# Patient Record
Sex: FEMALE | Race: WHITE | NOT HISPANIC OR LATINO | Employment: FULL TIME | URBAN - METROPOLITAN AREA
[De-identification: names, ages, dates, MRNs, and addresses within clinical notes are randomized per-mention and may not be internally consistent; named-entity substitution may affect disease eponyms.]

---

## 2017-03-20 ENCOUNTER — APPOINTMENT (OUTPATIENT)
Dept: RADIOLOGY | Facility: MEDICAL CENTER | Age: 37
End: 2017-03-20
Attending: EMERGENCY MEDICINE
Payer: COMMERCIAL

## 2017-03-20 ENCOUNTER — RESOLUTE PROFESSIONAL BILLING HOSPITAL PROF FEE (OUTPATIENT)
Dept: HOSPITALIST | Facility: MEDICAL CENTER | Age: 37
End: 2017-03-20
Payer: COMMERCIAL

## 2017-03-20 ENCOUNTER — HOSPITAL ENCOUNTER (OUTPATIENT)
Facility: MEDICAL CENTER | Age: 37
End: 2017-03-21
Attending: EMERGENCY MEDICINE | Admitting: HOSPITALIST
Payer: COMMERCIAL

## 2017-03-20 DIAGNOSIS — R06.09 DYSPNEA ON EXERTION: ICD-10-CM

## 2017-03-20 DIAGNOSIS — I15.9 SECONDARY HYPERTENSION: ICD-10-CM

## 2017-03-20 DIAGNOSIS — E66.01 MORBID OBESITY, UNSPECIFIED OBESITY TYPE (HCC): ICD-10-CM

## 2017-03-20 LAB
ALBUMIN SERPL BCP-MCNC: 3.7 G/DL (ref 3.2–4.9)
ALBUMIN/GLOB SERPL: 1.3 G/DL
ALP SERPL-CCNC: 70 U/L (ref 30–99)
ALT SERPL-CCNC: 18 U/L (ref 2–50)
ANION GAP SERPL CALC-SCNC: 8 MMOL/L (ref 0–11.9)
APTT PPP: 27.2 SEC (ref 24.7–36)
AST SERPL-CCNC: 16 U/L (ref 12–45)
BASOPHILS # BLD AUTO: 0.7 % (ref 0–1.8)
BASOPHILS # BLD: 0.07 K/UL (ref 0–0.12)
BILIRUB SERPL-MCNC: 0.5 MG/DL (ref 0.1–1.5)
BNP SERPL-MCNC: 40 PG/ML (ref 0–100)
BUN SERPL-MCNC: 9 MG/DL (ref 8–22)
CALCIUM SERPL-MCNC: 8.8 MG/DL (ref 8.5–10.5)
CHLORIDE SERPL-SCNC: 105 MMOL/L (ref 96–112)
CO2 SERPL-SCNC: 25 MMOL/L (ref 20–33)
CREAT SERPL-MCNC: 0.61 MG/DL (ref 0.5–1.4)
EKG IMPRESSION: NORMAL
EOSINOPHIL # BLD AUTO: 0.32 K/UL (ref 0–0.51)
EOSINOPHIL NFR BLD: 3.4 % (ref 0–6.9)
ERYTHROCYTE [DISTWIDTH] IN BLOOD BY AUTOMATED COUNT: 41 FL (ref 35.9–50)
GFR SERPL CREATININE-BSD FRML MDRD: >60 ML/MIN/1.73 M 2
GLOBULIN SER CALC-MCNC: 2.8 G/DL (ref 1.9–3.5)
GLUCOSE SERPL-MCNC: 101 MG/DL (ref 65–99)
HCG SERPL QL: NEGATIVE
HCT VFR BLD AUTO: 38.3 % (ref 37–47)
HGB BLD-MCNC: 12.5 G/DL (ref 12–16)
IMM GRANULOCYTES # BLD AUTO: 0.05 K/UL (ref 0–0.11)
IMM GRANULOCYTES NFR BLD AUTO: 0.5 % (ref 0–0.9)
INR PPP: 0.93 (ref 0.87–1.13)
LYMPHOCYTES # BLD AUTO: 2.07 K/UL (ref 1–4.8)
LYMPHOCYTES NFR BLD: 22.1 % (ref 22–41)
MCH RBC QN AUTO: 28.7 PG (ref 27–33)
MCHC RBC AUTO-ENTMCNC: 32.6 G/DL (ref 33.6–35)
MCV RBC AUTO: 88 FL (ref 81.4–97.8)
MONOCYTES # BLD AUTO: 0.39 K/UL (ref 0–0.85)
MONOCYTES NFR BLD AUTO: 4.2 % (ref 0–13.4)
NEUTROPHILS # BLD AUTO: 6.46 K/UL (ref 2–7.15)
NEUTROPHILS NFR BLD: 69.1 % (ref 44–72)
NRBC # BLD AUTO: 0 K/UL
NRBC BLD AUTO-RTO: 0 /100 WBC
PLATELET # BLD AUTO: 255 K/UL (ref 164–446)
PMV BLD AUTO: 9.7 FL (ref 9–12.9)
POTASSIUM SERPL-SCNC: 3.8 MMOL/L (ref 3.6–5.5)
PROT SERPL-MCNC: 6.5 G/DL (ref 6–8.2)
PROTHROMBIN TIME: 12.8 SEC (ref 12–14.6)
RBC # BLD AUTO: 4.35 M/UL (ref 4.2–5.4)
SODIUM SERPL-SCNC: 138 MMOL/L (ref 135–145)
T4 SERPL-MCNC: 9.9 UG/DL (ref 4–12)
TROPONIN I SERPL-MCNC: <0.01 NG/ML (ref 0–0.04)
TSH SERPL DL<=0.005 MIU/L-ACNC: 0.58 UIU/ML (ref 0.3–3.7)
WBC # BLD AUTO: 9.4 K/UL (ref 4.8–10.8)

## 2017-03-20 PROCEDURE — 700102 HCHG RX REV CODE 250 W/ 637 OVERRIDE(OP): Performed by: HOSPITALIST

## 2017-03-20 PROCEDURE — G0378 HOSPITAL OBSERVATION PER HR: HCPCS

## 2017-03-20 PROCEDURE — A9270 NON-COVERED ITEM OR SERVICE: HCPCS | Performed by: HOSPITALIST

## 2017-03-20 PROCEDURE — 84484 ASSAY OF TROPONIN QUANT: CPT

## 2017-03-20 PROCEDURE — 84443 ASSAY THYROID STIM HORMONE: CPT

## 2017-03-20 PROCEDURE — 83880 ASSAY OF NATRIURETIC PEPTIDE: CPT

## 2017-03-20 PROCEDURE — 85025 COMPLETE CBC W/AUTO DIFF WBC: CPT

## 2017-03-20 PROCEDURE — 99285 EMERGENCY DEPT VISIT HI MDM: CPT

## 2017-03-20 PROCEDURE — 71010 DX-CHEST-PORTABLE (1 VIEW): CPT

## 2017-03-20 PROCEDURE — 96374 THER/PROPH/DIAG INJ IV PUSH: CPT

## 2017-03-20 PROCEDURE — 36415 COLL VENOUS BLD VENIPUNCTURE: CPT

## 2017-03-20 PROCEDURE — 85610 PROTHROMBIN TIME: CPT

## 2017-03-20 PROCEDURE — 84436 ASSAY OF TOTAL THYROXINE: CPT

## 2017-03-20 PROCEDURE — 96372 THER/PROPH/DIAG INJ SC/IM: CPT

## 2017-03-20 PROCEDURE — 93005 ELECTROCARDIOGRAM TRACING: CPT

## 2017-03-20 PROCEDURE — 80053 COMPREHEN METABOLIC PANEL: CPT

## 2017-03-20 PROCEDURE — 99219 PR INITIAL OBSERVATION CARE,LEVL II: CPT | Performed by: HOSPITALIST

## 2017-03-20 PROCEDURE — 700111 HCHG RX REV CODE 636 W/ 250 OVERRIDE (IP): Performed by: HOSPITALIST

## 2017-03-20 PROCEDURE — 84703 CHORIONIC GONADOTROPIN ASSAY: CPT

## 2017-03-20 PROCEDURE — 93306 TTE W/DOPPLER COMPLETE: CPT | Mod: 26 | Performed by: INTERNAL MEDICINE

## 2017-03-20 PROCEDURE — 93306 TTE W/DOPPLER COMPLETE: CPT

## 2017-03-20 PROCEDURE — 85730 THROMBOPLASTIN TIME PARTIAL: CPT

## 2017-03-20 RX ORDER — PROMETHAZINE HYDROCHLORIDE 25 MG/1
12.5-25 SUPPOSITORY RECTAL EVERY 4 HOURS PRN
Status: DISCONTINUED | OUTPATIENT
Start: 2017-03-20 | End: 2017-03-21 | Stop reason: HOSPADM

## 2017-03-20 RX ORDER — FLUOXETINE HYDROCHLORIDE 20 MG/1
40 CAPSULE ORAL DAILY
Status: DISCONTINUED | OUTPATIENT
Start: 2017-03-21 | End: 2017-03-20

## 2017-03-20 RX ORDER — ONDANSETRON 4 MG/1
4 TABLET, ORALLY DISINTEGRATING ORAL EVERY 4 HOURS PRN
Status: DISCONTINUED | OUTPATIENT
Start: 2017-03-20 | End: 2017-03-21 | Stop reason: HOSPADM

## 2017-03-20 RX ORDER — ACETAMINOPHEN 325 MG/1
650 TABLET ORAL EVERY 6 HOURS PRN
Status: DISCONTINUED | OUTPATIENT
Start: 2017-03-20 | End: 2017-03-21 | Stop reason: HOSPADM

## 2017-03-20 RX ORDER — ONDANSETRON 2 MG/ML
4 INJECTION INTRAMUSCULAR; INTRAVENOUS EVERY 4 HOURS PRN
Status: DISCONTINUED | OUTPATIENT
Start: 2017-03-20 | End: 2017-03-21 | Stop reason: HOSPADM

## 2017-03-20 RX ORDER — HEPARIN SODIUM 5000 [USP'U]/ML
5000 INJECTION, SOLUTION INTRAVENOUS; SUBCUTANEOUS EVERY 8 HOURS
Status: DISCONTINUED | OUTPATIENT
Start: 2017-03-20 | End: 2017-03-20

## 2017-03-20 RX ORDER — ENALAPRILAT 1.25 MG/ML
1.25 INJECTION INTRAVENOUS EVERY 6 HOURS PRN
Status: DISCONTINUED | OUTPATIENT
Start: 2017-03-20 | End: 2017-03-21 | Stop reason: HOSPADM

## 2017-03-20 RX ORDER — GABAPENTIN 100 MG/1
200 CAPSULE ORAL EVERY EVENING
Status: DISCONTINUED | OUTPATIENT
Start: 2017-03-20 | End: 2017-03-21 | Stop reason: HOSPADM

## 2017-03-20 RX ORDER — CLONIDINE HYDROCHLORIDE 0.1 MG/1
0.1 TABLET ORAL 4 TIMES DAILY PRN
Status: DISCONTINUED | OUTPATIENT
Start: 2017-03-20 | End: 2017-03-21 | Stop reason: HOSPADM

## 2017-03-20 RX ORDER — PROMETHAZINE HYDROCHLORIDE 25 MG/1
12.5-25 TABLET ORAL EVERY 4 HOURS PRN
Status: DISCONTINUED | OUTPATIENT
Start: 2017-03-20 | End: 2017-03-21 | Stop reason: HOSPADM

## 2017-03-20 RX ORDER — FLUOXETINE HYDROCHLORIDE 20 MG/1
40 CAPSULE ORAL
Status: DISCONTINUED | OUTPATIENT
Start: 2017-03-20 | End: 2017-03-21 | Stop reason: HOSPADM

## 2017-03-20 RX ADMIN — ENALAPRILAT 1.25 MG: 1.25 INJECTION INTRAVENOUS at 18:35

## 2017-03-20 RX ADMIN — ENOXAPARIN SODIUM 40 MG: 100 INJECTION SUBCUTANEOUS at 19:46

## 2017-03-20 RX ADMIN — GABAPENTIN 200 MG: 100 CAPSULE ORAL at 21:44

## 2017-03-20 RX ADMIN — ACETAMINOPHEN 650 MG: 325 TABLET, FILM COATED ORAL at 19:46

## 2017-03-20 RX ADMIN — FLUOXETINE HYDROCHLORIDE 40 MG: 20 CAPSULE ORAL at 19:46

## 2017-03-20 ASSESSMENT — LIFESTYLE VARIABLES
EVER_SMOKED: YES
EVER FELT BAD OR GUILTY ABOUT YOUR DRINKING: NO
AVERAGE NUMBER OF DAYS PER WEEK YOU HAVE A DRINK CONTAINING ALCOHOL: 7
HAVE YOU EVER FELT YOU SHOULD CUT DOWN ON YOUR DRINKING: YES
HAVE PEOPLE ANNOYED YOU BY CRITICIZING YOUR DRINKING: NO
ON A TYPICAL DAY WHEN YOU DRINK ALCOHOL HOW MANY DRINKS DO YOU HAVE: 3
EVER HAD A DRINK FIRST THING IN THE MORNING TO STEADY YOUR NERVES TO GET RID OF A HANGOVER: NO
TOTAL SCORE: 1
TOTAL SCORE: 1
CONSUMPTION TOTAL: POSITIVE
TOTAL SCORE: 1
DO YOU DRINK ALCOHOL: NO
HOW MANY TIMES IN THE PAST YEAR HAVE YOU HAD 5 OR MORE DRINKS IN A DAY: 0
ALCOHOL_USE: YES

## 2017-03-20 ASSESSMENT — PAIN SCALES - GENERAL
PAINLEVEL_OUTOF10: 9
PAINLEVEL_OUTOF10: 8
PAINLEVEL_OUTOF10: 0
PAINLEVEL_OUTOF10: 8

## 2017-03-20 NOTE — ED NOTES
"Chief Complaint   Patient presents with   • Shortness of Breath   • Peripheral Edema   • Weakness     3+ pitting edema lower legs and also generalized. Pt SOB denies any cough. Pt reports s/s come and go. 15 lbs weight gain in past week. AMB to triage increased SOB. No medical dx, but pt waiting for PCP appointment. Blood pressure 165/105, pulse 82, temperature 37.3 °C (99.2 °F), resp. rate 16, height 1.651 m (5' 5\"), weight 170.6 kg (376 lb 1.7 oz), last menstrual period 03/17/2017, SpO2 96 %.    "

## 2017-03-20 NOTE — IP AVS SNAPSHOT
" Home Care Instructions                                                                                                                  Name:Sonya Flor  Medical Record Number:[id number]  CSN: [id number]    YOB: 1980   Age: 36 y.o.  Sex: female  HT:1.651 m (5' 5\") WT: 169.1 kg (372 lb 12.8 oz)          Admit Date: 3/20/2017     Discharge Date:   Today's Date: 3/21/2017  Attending Doctor:  Noé Perez M.D.                  Allergies:  Review of patient's allergies indicates no known allergies.            Discharge Instructions       Discharge Instructions    Discharged to home by car with relative. Discharged via wheelchair, hospital escort: Yes.  Special equipment needed: Not Applicable    Be sure to schedule a follow-up appointment with your primary care doctor or any specialists as instructed.     Discharge Plan:   Diet Plan: Discussed  Activity Level: Discussed  Confirmed Follow up Appointment: Appointment Scheduled  Confirmed Symptoms Management: Discussed  Medication Reconciliation Updated: Yes  Influenza Vaccine Indication: Patient Refuses    I understand that a diet low in cholesterol, fat, and sodium is recommended for good health. Unless I have been given specific instructions below for another diet, I accept this instruction as my diet prescription.   Other diet: Heart Healthy    Special Instructions: None    · Is patient discharged on Warfarin / Coumadin?   No     · Is patient Post Blood Transfusion?  No    Depression / Suicide Risk    As you are discharged from this [facility] facility, it is important to learn how to keep safe from harming yourself.    Recognize the warning signs:  · Abrupt changes in personality, positive or negative- including increase in energy   · Giving away possessions  · Change in eating patterns- significant weight changes-  positive or negative  · Change in sleeping patterns- unable to sleep or sleeping all the time   · Unwillingness or inability to " communicate  · Depression  · Unusual sadness, discouragement and loneliness  · Talk of wanting to die  · Neglect of personal appearance   · Rebelliousness- reckless behavior  · Withdrawal from people/activities they love  · Confusion- inability to concentrate     If you or a loved one observes any of these behaviors or has concerns about self-harm, here's what you can do:  · Talk about it- your feelings and reasons for harming yourself  · Remove any means that you might use to hurt yourself (examples: pills, rope, extension cords, firearm)  · Get professional help from the community (Mental Health, Substance Abuse, psychological counseling)  · Do not be alone:Call your Safe Contact- someone whom you trust who will be there for you.  · Call your local CRISIS HOTLINE 622-2759 or 038-597-5619  · Call your local Children's Mobile Crisis Response Team [place] (463) 789-7148 or www.WorkTouch  · Call the toll free National Suicide Prevention Hotlines   · National Suicide Prevention Lifeline 517-015-NRGA (2533)  · National Hope Line Network 800-SUICIDE (901-8450)  Stress and Stress Management  Stress is a normal reaction to life events. It is what you feel when life demands more than you are used to or more than you can handle. Some stress can be useful. For example, the stress reaction can help you catch the last bus of the day, study for a test, or meet a deadline at work. But stress that occurs too often or for too long can cause problems. It can affect your emotional health and interfere with relationships and normal daily activities. Too much stress can weaken your immune system and increase your risk for physical illness. If you already have a medical problem, stress can make it worse.  CAUSES   All sorts of life events may cause stress. An event that causes stress for one person may not be stressful for another person. Major life events commonly cause stress. These may be positive or negative. Examples  include losing your job, moving into a new home, getting , having a baby, or losing a loved one. Less obvious life events may also cause stress, especially if they occur day after day or in combination. Examples include working long hours, driving in traffic, caring for children, being in debt, or being in a difficult relationship.  SIGNS AND SYMPTOMS  Stress may cause emotional symptoms including, the following:  · Anxiety. This is feeling worried, afraid, on edge, overwhelmed, or out of control.  · Anger. This is feeling irritated or impatient.  · Depression. This is feeling sad, down, helpless, or guilty.  · Difficulty focusing, remembering, or making decisions.  Stress may cause physical symptoms, including the following:   · Aches and pains. These may affect your head, neck, back, stomach, or other areas of your body.  · Tight muscles or clenched jaw.  · Low energy or trouble sleeping.   Stress may cause unhealthy behaviors, including the following:   · Eating to feel better (overeating) or skipping meals.  · Sleeping too little, too much, or both.  · Working too much or putting off tasks (procrastination).  · Smoking, drinking alcohol, or using drugs to feel better.  DIAGNOSIS   Stress is diagnosed through an assessment by your health care provider. Your health care provider will ask questions about your symptoms and any stressful life events. Your health care provider will also ask about your medical history and may order blood tests or other tests. Certain medical conditions and medicine can cause physical symptoms similar to stress.  Mental illness can cause emotional symptoms and unhealthy behaviors similar to stress. Your health care provider may refer you to a mental health professional for further evaluation.   TREATMENT   Stress management is the recommended treatment for stress. The goals of stress management are reducing stressful life events and coping with stress in healthy ways.    "  Techniques for reducing stressful life events include the following:  · Stress identification. Self-monitor for stress and identify what causes stress for you. These skills may help you to avoid some stressful events.  · Time management. Set your priorities, keep a calendar of events, and learn to say \"no.\" These tools can help you avoid making too many commitments.  Techniques for coping with stress include the following:  · Rethinking the problem. Try to think realistically about stressful events rather than ignoring them or overreacting. Try to find the positives in a stressful situation rather than focusing on the negatives.  · Exercise. Physical exercise can release both physical and emotional tension. The key is to find a form of exercise you enjoy and do it regularly.  · Relaxation techniques. These relax the body and mind. Examples include yoga, meditation, florencia chi, biofeedback, deep breathing, progressive muscle relaxation, listening to music, being out in nature, journaling, and other hobbies. Again, the key is to find one or more that you enjoy and can do regularly.  · Healthy lifestyle. Eat a balanced diet, get plenty of sleep, and do not smoke. Avoid using alcohol or drugs to relax.  · Strong support network. Spend time with family, friends, or other people you enjoy being around. Express your feelings and talk things over with someone you trust.  Counseling or talk therapy with a mental health professional may be helpful if you are having difficulty managing stress on your own. Medicine is typically not recommended for the treatment of stress. Talk to your health care provider if you think you need medicine for symptoms of stress.  HOME CARE INSTRUCTIONS  · Keep all follow-up visits as directed by your health care provider.  · Take all medicines as directed by your health care provider.  SEEK MEDICAL CARE IF:  · Your symptoms get worse or you start having new symptoms.  · You feel overwhelmed by your " problems and can no longer manage them on your own.  SEEK IMMEDIATE MEDICAL CARE IF:  · You feel like hurting yourself or someone else.     This information is not intended to replace advice given to you by your health care provider. Make sure you discuss any questions you have with your health care provider.     Document Released: 06/13/2002 Document Revised: 01/08/2016 Document Reviewed: 08/12/2014  [employer] Patient Education ©2016 [employer]        Your appointments     May 17, 2017  9:00 AM   New Patient with Domitila Ahumada M.D.   [facility] (--)    [address]  [address]  [place] [id number]   [phone]           Please bring Photo ID, Insurance Cards, All Medication Bottles and copies of any legal documents (such as Living Will, Power of ) If speaking a language besides English please bring an adult . Please arrive 30 minutes prior for check in and registration. You will be receiving a confirmation call a few days before your appointment from our automated call confirmation system.                 Discharge Medication Instructions:    Below are the medications your physician expects you to take upon discharge:    Review all your home medications and newly ordered medications with your doctor and/or pharmacist. Follow medication instructions as directed by your doctor and/or pharmacist.    Please keep your medication list with you and share with your physician.               Medication List      CHANGE how you take these medications        Instructions    fluoxetine 20 MG Caps   What changed:  See the new instructions.   Last time this was given:  40 mg on 3/20/2017  7:46 PM   Commonly known as:  PROZAC    TAKE ONE CAPSULE BY MOUTH EVERY DAY               Instructions           Diet / Nutrition:    Follow any diet instructions given to you by your doctor or the dietician, including how much salt (sodium) you are allowed each day.    If you are overweight,  talk to your doctor about a weight reduction plan.    Activity:    Remain physically active following your doctor's instructions about exercise and activity.    Rest often.     Any time you become even a little tired or short of breath, SIT DOWN and rest.    Worsening Symptoms:    Report any of the following signs and symptoms to the doctor's office immediately:    *Pain of jaw, arm, or neck  *Chest pain not relieved by medication                               *Dizziness or loss of consciousness  *Difficulty breathing even when at rest   *More tired than usual                                       *Bleeding drainage or swelling of surgical site  *Swelling of feet, ankles, legs or stomach                 *Fever (>100ºF)  *Pink or blood tinged sputum  *Weight gain (3lbs/day or 5lbs /week)           *Shock from internal defibrillator (if applicable)  *Palpitations or irregular heartbeats                *Cool and/or numb extremities    Stroke Awareness    Common Risk Factors for Stroke include:    Age  Atrial Fibrillation  Carotid Artery Stenosis  Diabetes Mellitus  Excessive alcohol consumption  High blood pressure  Overweight   Physical inactivity  Smoking    Warning signs and symptoms of a stroke include:    *Sudden numbness or weakness of the face, arm or leg (especially on one side of the body).  *Sudden confusion, trouble speaking or understanding.  *Sudden trouble seeing in one or both eyes.  *Sudden trouble walking, dizziness, loss of balance or coordination.Sudden severe headache with no known cause.    It is very important to get treatment quickly when a stroke occurs. If you experience any of the above warning signs, call 911 immediately.                   Disclaimer         Quit Smoking / Tobacco Use:    I understand the use of any tobacco products increases my chance of suffering from future heart disease or stroke and could cause other illnesses which may shorten my life. Quitting the use of tobacco  products is the single most important thing I can do to improve my health. For further information on smoking / tobacco cessation call a Toll Free Quit Line at 1-539.795.1437 (*National Cancer [place]) or 1-645.884.2402 (American Lung Association) or you can access the web based program at www.lungusa.org.    [place] Tobacco Users Help Line:  (284) 857-8430       Toll Free: 1-621.550.5731  Quit Tobacco Program [facility] (349)426-7386    Crisis Hotline:    [place] Crisis Hotline:  8-566-SCOGLVN or 1-288.853.3780    [place] Crisis Hotline:    1-897.660.7329 or 520-371-4022    Discharge Survey:   Thank you for choosing [facility]. We hope we did everything we could to make your hospital stay a pleasant one. You may be receiving a phone survey and we would appreciate your time and participation in answering the questions. Your input is very valuable to us in our efforts to improve our service to our patients and their families.        My signature on this form indicates that:    1. I have reviewed and understand the above information.  2. My questions regarding this information have been answered to my satisfaction.  3. I have formulated a plan with my discharge nurse to obtain my prescribed medications for home.                  Disclaimer         __________________________________                     __________       ________                       Patient Signature                                                 Date                    Time

## 2017-03-21 ENCOUNTER — PATIENT OUTREACH (OUTPATIENT)
Dept: HEALTH INFORMATION MANAGEMENT | Facility: OTHER | Age: 37
End: 2017-03-21

## 2017-03-21 VITALS
RESPIRATION RATE: 18 BRPM | DIASTOLIC BLOOD PRESSURE: 72 MMHG | OXYGEN SATURATION: 94 % | WEIGHT: 293 LBS | BODY MASS INDEX: 48.82 KG/M2 | SYSTOLIC BLOOD PRESSURE: 128 MMHG | TEMPERATURE: 98.5 F | HEIGHT: 65 IN | HEART RATE: 74 BPM

## 2017-03-21 LAB
DEPRECATED D DIMER PPP IA-ACNC: <200 NG/ML(D-DU)
LV EJECT FRACT  99904: 55
LV EJECT FRACT MOD 2C 99903: 53.3
LV EJECT FRACT MOD 4C 99902: 52.46
LV EJECT FRACT MOD BP 99901: 53.42

## 2017-03-21 PROCEDURE — 85379 FIBRIN DEGRADATION QUANT: CPT

## 2017-03-21 PROCEDURE — 700111 HCHG RX REV CODE 636 W/ 250 OVERRIDE (IP): Performed by: HOSPITALIST

## 2017-03-21 PROCEDURE — 96372 THER/PROPH/DIAG INJ SC/IM: CPT

## 2017-03-21 PROCEDURE — G0378 HOSPITAL OBSERVATION PER HR: HCPCS

## 2017-03-21 PROCEDURE — 99217 PR OBSERVATION CARE DISCHARGE: CPT | Performed by: FAMILY MEDICINE

## 2017-03-21 RX ADMIN — ENOXAPARIN SODIUM 40 MG: 100 INJECTION SUBCUTANEOUS at 08:55

## 2017-03-21 ASSESSMENT — PAIN SCALES - GENERAL
PAINLEVEL_OUTOF10: 0
PAINLEVEL_OUTOF10: 0

## 2017-03-21 ASSESSMENT — COPD QUESTIONNAIRES
HAVE YOU SMOKED AT LEAST 100 CIGARETTES IN YOUR ENTIRE LIFE: YES
DO YOU EVER COUGH UP ANY MUCUS OR PHLEGM?: NO/ONLY WITH OCCASIONAL COLDS OR INFECTIONS
DURING THE PAST 4 WEEKS HOW MUCH DID YOU FEEL SHORT OF BREATH: SOME OF THE TIME
COPD SCREENING SCORE: 4

## 2017-03-21 ASSESSMENT — LIFESTYLE VARIABLES: EVER_SMOKED: YES

## 2017-03-21 NOTE — PROGRESS NOTES
PIV no longer flushing. PIV removed. Pt tolerated well; Notified WOODY Villagomez of no IV access. IV access not needed at this time.

## 2017-03-21 NOTE — PROGRESS NOTES
"Pt arrived to unit via smith at 1825. Pt oriented to room, unit, and plan of care. Tele-monitor placed; Pt crying, and anxious; BP elevated; medicated per MAR. Admit profile complete; Pt states,\" I'm not feeling as anxious.\" Pt verbalizes that she has been feeling tired more than normal; sleeping well , but still tired; Pt denies CP, and SOB; All questions answered at this time. Call light within reach; fall precautions in place.   "

## 2017-03-21 NOTE — PROGRESS NOTES
Pt c/o restless leg and not being able to sleep because of it. States used to take gabapentin 200mg QHS. Dr. Lundy notified, TO for gabapentine 200mg QHS PO, read back to Dr. Lundy.

## 2017-03-21 NOTE — ED NOTES
Pt resting in bed. Family at bedside.  Denies pain, states that she is anxious and wants to go home. She explained that when she is having anxiety issues at home she uses  Her sisters medications (hydrooxyzine)

## 2017-03-21 NOTE — ED NOTES
Pt resting comfortably in bed, denies pain, shows no s/s of distress. Pt's family is at bedside. Monitors in place. Will continue to monitor.

## 2017-03-21 NOTE — PROGRESS NOTES
"RN discharging pt. Pt begins to cry. Pt says, \" I am so anxious I feel like I am going to throw up!\" Pt asks why MD will not give her anything for anxiety. RN provided emotional support and offered mental health resources. RN recommend to follow up with PCP for anxiety concerns. Pt verbalized understanding and refused mental health resources.   Pt discharged; Personal belongings with pt when leaving unit. Pt given discharge instructions prior to leaving unit including  when to visit with physician; verbalizes understanding. Copy of discharge instructions with pt and in the chart.  "

## 2017-03-21 NOTE — DISCHARGE SUMMARY
Hospital Medicine Discharge Note     Admit Date:  3/20/2017         Discharge Date:   3/21/2017    Primary Care Provider:    Pcp Pt States None    Attending Physician:   Gini Peter M.D.     Discharge Diagnoses:   Active Problems:    MATT (dyspnea on exertion)    Chronic Medical Problems:  Includes morbid obesity with history of gastric sleeve    in 2013 in [place], [place].    Consultants:      None     Studies:  White count 9, hemoglobin 12.5, platelet count 255,000.  BUN   and creatinine 9 and 0.68.  Rest of CMP unremarkable.  Troponin less than    0.01.  BNP of 40.    Imaging/ Testing:      ECHOCARDIOGRAM COMP W/O CONT   Final Result   CONCLUSIONS  No prior study is available for comparison.   Technically difficult study - adequate information is obtained.   Normal left ventricular size, thickness, systolic function, and   diastolic function.  Left ventricular ejection fraction is visually estimated to be 55%.  The left atrium is normal in size.  Structurally normal mitral valve without significant stenosis or   regurgitation.  Structurally normal aortic valve without significant stenosis or   regurgitation.  The right ventricle was normal in size and function.  Normal pericardium without effusion.  Estimated right ventricular systolic pressure is 30 mmHg.     DX-CHEST-PORTABLE (1 VIEW)   Final Result      No acute cardiopulmonary process is seen.        Procedures:        None     Hospital Summary (Brief Narrative):       For H and P on admission, please refer to full H and P dictated by Dr. Lundy on 3/20/17. In brief this is a 36 year old female with history of morbid obesity, s/p gastric sleeve surgery in 2013 where she reportedly lost 170 pounds in 10 months but gained most of it back. She presented with complaints of increased exertional dyspnea over the past 8 months and leg swelling. An echocardiogram was done which was unremarkable with 55% EF as noted above. Her blood pressure was slightly elevated  overnight but resolved and I do not believe she needs anti-hypertensive treatment at this time. D-dimer was negative. She has been maintaining O2 saturations on room air fine. Medically stable. She has a follow up with PCP and sleep study to evaluate ROB. At this time stable for discharge home.     Disposition:   Discharge home    Condition:  Stable    Activity:   As tolerated    Diet:   As tolerated     Discharge Medications:         Sonya Flor   Home Medication Instructions LITTLE:[id number]    Printed on:03/21/17 5695   Medication Information                      fluoxetine (PROZAC) 20 MG Cap  TAKE ONE CAPSULE BY MOUTH EVERY DAY               Follow up appointment details :      PCP as scheduled - for ROB sleep study referral     Instructions:      Return to ER if new or worsening symptoms develop

## 2017-03-21 NOTE — PROGRESS NOTES
Bedside report received 0705. POC discussed with pt; Pt verbalizes improvement in how she feels, No over night events; Leg swelling has decreased and pt denies SOB; all questions answered at this time.

## 2017-03-21 NOTE — DISCHARGE PLANNING
Care Transition Team Assessment    Information Source  Orientation : Oriented x 4  Information Given By: Patient  Who is responsible for making decisions for patient? : Patient    Readmission Evaluation  Is this a readmission?: No    Elopement Risk  Legal Hold: No  Ambulatory or Self Mobile in Wheelchair: No-Not an Elopement Risk    Interdisciplinary Discharge Planning  Does Admitting Nurse Feel This Could be a Complex Discharge?: No  Primary Care Physician: Domitila Ahumada (pt has first appt in May)  Lives with - Patient's Self Care Capacity: Alone and Able to Care For Self  Support Systems: Parent  Do You Take your Prescribed Medications Regularly: Yes  Able to Return to Previous ADL's: Yes  Mobility Issues: No  Prior Services: None  Assistance Needed: No  Durable Medical Equipment: Not Applicable    Discharge Preparedness  What are your discharge supports?: Parent  Prior Functional Level: Independent with Activities of Daily Living  Difficulity with ADLs: None  Difficulity with IADLs: None    Functional Assesment  Prior Functional Level: Independent with Activities of Daily Living    Finances  Prescription Coverage: Yes                   Domestic Abuse  Have you ever been the victim of abuse or violence?: No    Psychological Assessment  History of Psychiatric Problems: Yes (depression)    Discharge Risks or Barriers  Discharge risks or barriers?: No    Anticipated Discharge Information  Anticipated discharge disposition: Home  Discharge Address: facesheet is for billing (physical address [id number], [place])  Discharge Contact Phone Number: [phone]

## 2017-03-21 NOTE — PROGRESS NOTES
Report received from Audelia OLIVA. Tele monitoring in place. Family at bedside. Pt ambulated to bathroom, no assistance needed, steady. Bed in low position, call light within reach.

## 2017-03-21 NOTE — DISCHARGE INSTRUCTIONS
Discharge Instructions    Discharged to home by car with relative. Discharged via wheelchair, hospital escort: Yes.  Special equipment needed: Not Applicable    Be sure to schedule a follow-up appointment with your primary care doctor or any specialists as instructed.     Discharge Plan:   Diet Plan: Discussed  Activity Level: Discussed  Confirmed Follow up Appointment: Appointment Scheduled  Confirmed Symptoms Management: Discussed  Medication Reconciliation Updated: Yes  Influenza Vaccine Indication: Patient Refuses    I understand that a diet low in cholesterol, fat, and sodium is recommended for good health. Unless I have been given specific instructions below for another diet, I accept this instruction as my diet prescription.   Other diet: Heart Healthy    Special Instructions: None    · Is patient discharged on Warfarin / Coumadin?   No     · Is patient Post Blood Transfusion?  No    Depression / Suicide Risk    As you are discharged from this [facility] facility, it is important to learn how to keep safe from harming yourself.    Recognize the warning signs:  · Abrupt changes in personality, positive or negative- including increase in energy   · Giving away possessions  · Change in eating patterns- significant weight changes-  positive or negative  · Change in sleeping patterns- unable to sleep or sleeping all the time   · Unwillingness or inability to communicate  · Depression  · Unusual sadness, discouragement and loneliness  · Talk of wanting to die  · Neglect of personal appearance   · Rebelliousness- reckless behavior  · Withdrawal from people/activities they love  · Confusion- inability to concentrate     If you or a loved one observes any of these behaviors or has concerns about self-harm, here's what you can do:  · Talk about it- your feelings and reasons for harming yourself  · Remove any means that you might use to hurt yourself (examples: pills, rope, extension cords, firearm)  · Get  professional help from the community (Mental Health, Substance Abuse, psychological counseling)  · Do not be alone:Call your Safe Contact- someone whom you trust who will be there for you.  · Call your local CRISIS HOTLINE 221-0977 or 530-679-8949  · Call your local Children's Mobile Crisis Response Team [place] (876) 155-8705 or www.[employer]  · Call the toll free National Suicide Prevention Hotlines   · National Suicide Prevention Lifeline 228-630-EKRO (4217)  · [employer] Hope Line Network 800-SUICIDE (278-1439)  Stress and Stress Management  Stress is a normal reaction to life events. It is what you feel when life demands more than you are used to or more than you can handle. Some stress can be useful. For example, the stress reaction can help you catch the last bus of the day, study for a test, or meet a deadline at work. But stress that occurs too often or for too long can cause problems. It can affect your emotional health and interfere with relationships and normal daily activities. Too much stress can weaken your immune system and increase your risk for physical illness. If you already have a medical problem, stress can make it worse.  CAUSES   All sorts of life events may cause stress. An event that causes stress for one person may not be stressful for another person. Major life events commonly cause stress. These may be positive or negative. Examples include losing your job, moving into a new home, getting , having a baby, or losing a loved one. Less obvious life events may also cause stress, especially if they occur day after day or in combination. Examples include working long hours, driving in traffic, caring for children, being in debt, or being in a difficult relationship.  SIGNS AND SYMPTOMS  Stress may cause emotional symptoms including, the following:  · Anxiety. This is feeling worried, afraid, on edge, overwhelmed, or out of control.  · Anger. This is feeling irritated or  "impatient.  · Depression. This is feeling sad, down, helpless, or guilty.  · Difficulty focusing, remembering, or making decisions.  Stress may cause physical symptoms, including the following:   · Aches and pains. These may affect your head, neck, back, stomach, or other areas of your body.  · Tight muscles or clenched jaw.  · Low energy or trouble sleeping.   Stress may cause unhealthy behaviors, including the following:   · Eating to feel better (overeating) or skipping meals.  · Sleeping too little, too much, or both.  · Working too much or putting off tasks (procrastination).  · Smoking, drinking alcohol, or using drugs to feel better.  DIAGNOSIS   Stress is diagnosed through an assessment by your health care provider. Your health care provider will ask questions about your symptoms and any stressful life events. Your health care provider will also ask about your medical history and may order blood tests or other tests. Certain medical conditions and medicine can cause physical symptoms similar to stress.  Mental illness can cause emotional symptoms and unhealthy behaviors similar to stress. Your health care provider may refer you to a mental health professional for further evaluation.   TREATMENT   Stress management is the recommended treatment for stress. The goals of stress management are reducing stressful life events and coping with stress in healthy ways.   Techniques for reducing stressful life events include the following:  · Stress identification. Self-monitor for stress and identify what causes stress for you. These skills may help you to avoid some stressful events.  · Time management. Set your priorities, keep a calendar of events, and learn to say \"no.\" These tools can help you avoid making too many commitments.  Techniques for coping with stress include the following:  · Rethinking the problem. Try to think realistically about stressful events rather than ignoring them or overreacting. Try to find " the positives in a stressful situation rather than focusing on the negatives.  · Exercise. Physical exercise can release both physical and emotional tension. The key is to find a form of exercise you enjoy and do it regularly.  · Relaxation techniques. These relax the body and mind. Examples include yoga, meditation, florencia chi, biofeedback, deep breathing, progressive muscle relaxation, listening to music, being out in nature, journaling, and other hobbies. Again, the key is to find one or more that you enjoy and can do regularly.  · Healthy lifestyle. Eat a balanced diet, get plenty of sleep, and do not smoke. Avoid using alcohol or drugs to relax.  · Strong support network. Spend time with family, friends, or other people you enjoy being around. Express your feelings and talk things over with someone you trust.  Counseling or talk therapy with a mental health professional may be helpful if you are having difficulty managing stress on your own. Medicine is typically not recommended for the treatment of stress. Talk to your health care provider if you think you need medicine for symptoms of stress.  HOME CARE INSTRUCTIONS  · Keep all follow-up visits as directed by your health care provider.  · Take all medicines as directed by your health care provider.  SEEK MEDICAL CARE IF:  · Your symptoms get worse or you start having new symptoms.  · You feel overwhelmed by your problems and can no longer manage them on your own.  SEEK IMMEDIATE MEDICAL CARE IF:  · You feel like hurting yourself or someone else.     This information is not intended to replace advice given to you by your health care provider. Make sure you discuss any questions you have with your health care provider.     Document Released: 06/13/2002 Document Revised: 01/08/2016 Document Reviewed: 08/12/2014  [employer] Interactive Patient Education ©2016 [employer]

## 2017-03-21 NOTE — PROGRESS NOTES
Assessed pt. A&O x4. Denies n/t, nausea at this time. C/o headache, med per MAR. POC discussed with pt. Hourly rounding in place.

## 2017-03-21 NOTE — ED PROVIDER NOTES
ED Provider Note    CHIEF COMPLAINT  Chief Complaint   Patient presents with   • Shortness of Breath   • Peripheral Edema   • Weakness       HPI  Sonya Flor is a 36 y.o. female who presents to the emergency department complaining of leg edema. The patient says that over the last 4 days she's been having shortness of breath with any kind of exertion bilateral leg pain she feels her legs are swollen she feels her face is puffy and she says that she's gained 15 pounds in the last 1 week. The patient is morbidly obese and several years ago was weighing more than 400 pounds and underwent a sleeve procedure and she says that her weight did go down to 200 pounds but over the last 1 year she has gone back up to 360 pounds. The patient says that with any exertion she is completely out of breath and she definitely is short of breath just walking to the bathroom and back to her room in the emergency department. The patient says that she quit smoking one month ago. The patient has a history of hypertension and is hypertensive in the emergency department but says she is not taking any medications for this.     REVIEW OF SYSTEMS no fever or chills no hemoptysis no chest pain. All other systems negative    PAST MEDICAL HISTORY  Past Medical History   Diagnosis Date   • MDD (major depressive disorder)        FAMILY HISTORY  No family history on file.    SOCIAL HISTORY  Social History     Social History   • Marital Status: Single     Spouse Name: N/A   • Number of Children: N/A   • Years of Education: N/A     Social History Main Topics   • Smoking status: Current Every Day Smoker -- 0.25 packs/day     Types: Cigarettes   • Smokeless tobacco: Never Used   • Alcohol Use: No   • Drug Use: No   • Sexual Activity: Not on file     Other Topics Concern   • Not on file     Social History Narrative       SURGICAL HISTORY  No past surgical history on file.    CURRENT MEDICATIONS  Home Medications     Reviewed by Audelia Sexton R.N.  "(Registered Nurse) on 03/20/17 at 1824  Med List Status: Complete    Medication Last Dose Status    fluoxetine (PROZAC) 20 MG Cap 3/19/2017 Active                ALLERGIES  No Known Allergies    PHYSICAL EXAM  VITAL SIGNS: /101 mmHg  Pulse 82  Temp(Src) 37.3 °C (99.1 °F)  Resp 18  Ht 1.651 m (5' 5\")  Wt 169.1 kg (372 lb 12.8 oz)  BMI 62.04 kg/m2  SpO2 95%  LMP 03/17/2017  Breastfeeding? No   Oxygen saturation is interpreted as adequate  Constitutional: Awake and verbal and dyspneic after walking back from the restroom but otherwise nontoxic appearing  HENT: Mucous membranes are moist and throat clear  Eyes: No erythema or discharge or jaundice  Neck: Trachea midline no JVD  Cardiovascular: Regular rate and rhythm  Lungs: Clear and equal bilaterally with no apparent difficulty breathing  Abdomen/Back: Morbidly obese soft nontender nondistended  Skin: Warm and dry  Musculoskeletal: The patient's legs are tremendously obese I do not find. Edema the feet are warm and well perfused  Neurologic: Awake and verbal and moving all extremities    Laboratory  A CBC shows a white blood cell count of 9.4 hemoglobin cyclical 0.5 basic metabolic panels unremarkable LFTs are unremarkable troponin is normal BMP is normal at 40 INR is normal pregnancy test was negative    EKG interpretation  A 12-lead EKG showed a sinus rhythm at 86 bpm there is no pathologic ST elevation or depression or ectopy MN interval is 148 ms QTc interval is 464 ms    Radiology  DX-CHEST-PORTABLE (1 VIEW)   Final Result      No acute cardiopulmonary process is seen.      ECHOCARDIOGRAM COMP W/O CONT    (Results Pending)     MEDICAL DECISION MAKING and DISPOSITION  In the emergency department an IV was established and the patient was placed on a cardiac monitor. I reviewed all the findings with the patient. I reviewed the case with the hospitalist and the patient will be admitted for further evaluation and further treatment    IMPRESSION  1. " Dyspnea with exertion  2. Morbid obesity  3. Hypertension           Electronically signed by: Elio Haq, 3/20/2017 7:10 PM

## 2017-03-21 NOTE — H&P
PRIMARY CARE PHYSICIAN:  None.    CHIEF COMPLAINT:  Dyspnea with exertion and fatigue.    HISTORY OF PRESENT ILLNESS:  Patient is a 36-year-old female with history of   morbid obesity, gastric sleeve surgery in 2013 where she reports initially   losing 170 pounds over 10 months, has gained most of it back, now here at   [facility] with complaints of progressive worsening shortness of breath especially   with exertion over the past 8 months.  Patient notes leg swelling has been   usually 1 or 2 days and then resolved, lately been more continuous lasting 4   days.  Reports at times tingling in her feet.  Reports increasing shortness of   breath with anxiety.  Reports in the ER some bruising noted on her thumb.    Reports no falls.  Has occasional wheeze.  Has been a smoker since age 16   years of age having cut down to 4 cigarettes a day, quit a month ago.  She   reports no melena or hematochezia.  No calf pain.  No chest pain or localized   weakness.  The patient reports daughter has not slept well since childhood.    She also snores.    REVIEW OF SYSTEMS:  As above, otherwise, negative according to AMA and CMS   criteria.    PAST MEDICAL HISTORY:  Includes morbid obesity with history of gastric sleeve   in 2013 in [place], [place].    MEDICATIONS:  She is currently on Prozac 20 daily.    ALLERGIES:  No known drug allergies.    SOCIAL HISTORY:  Reports averaging 4 cigarettes a day, having smoked since age   16 and quit a month ago.  She averages 24 ounces of beer a day.  Reports no   alcoholism.  She apparently lives with mother.  No illicit drug use reported.    FAMILY HISTORY:  Grandmother had insulin-dependent diabetes.    PHYSICAL EXAMINATION:  VITAL SIGNS:  Revealed a temperature of 37.3, pulse 70s-80s, respirations was   _____21-19, blood pressure 165/105-157/84, 98% on room air, 170 kilograms.  GENERAL:  The patient was alert, appropriate, oriented, was anxious appearing,   otherwise no apparent distress,  morbidly obese.  HEENT:  Anicteric.  Extraocular movements are intact.  Mucous membranes were   moist.  NECK:  No cervical or supraclavicular adenopathy.  CARDIOVASCULAR:  Regular rate and rhythm.  No murmurs.  LUNGS:  Diminished breath sounds at bases.  No rales or wheeze.  No chest wall   tenderness.  ABDOMEN:  Severely obese, soft, nontender.  Bowel sounds present.  BACK:  No CVA tenderness.  There were small areas of bruises, a dime size to   patches, medial buttock.  EXTREMITIES:  She had 3+ lower extremity edema, nonpitting.  Negative Homans   sign.  Generalized 5/5 strength.  NEUROLOGIC:  Cranial nerves grossly intact.  No focal extremity weakness.  SKIN:  Warm, dry, without failure.  2+ symmetric radial pulses.  PSYCHIATRIC:  At times little tearful, anxious, labile.    LABORATORY DATA:  White count 9, hemoglobin 12.5, platelet count 255,000.  BUN   and creatinine 9 and 0.68.  Rest of CMP unremarkable.  Troponin less than   0.01.  BNP of 40.    INR 0.93.    IMAGING:  Chest x-ray revealed no acute cardiopulmonary process.    Patient's EKG on my interpretation revealed sinus rhythm, rate approximately   86, Q-wave in inferior lead III, nonspecific ST flattening in III.    IMPRESSION AND PLAN:  1.  Dyspnea with exertion.  Patient will be admitted observation status to   telemetry floor.  Her room air SaO2 is normal.  Her symptoms have been   progressive over 8 months with increased weight gain during this period.  We   will check echocardiogram to evaluate left ventricular function, right   ventricular pressure.  She does have lower extremity edema, although not   pitting, worsening concern for cor pulmonale.  In addition, she is high risk   for sleep apnea.  We will check nocturnal pulse oximetry, placed on   respiratory and O2 protocols with the referral to pulmonary for possible   pulmonary function testing, sleep study.  2.  Morbid obesity, post-gastric sleeve surgery with failure to maintain lower    weight, possibly multifactorial.  Her thyroid function is normal.  We will   consult nutrition with the referral to outpatient weight management programs.  3.  Medial buttock bruising.  She reports onset while in ER  gurStandard while   Will arrange specially mattress with a decubitus prevention.  4.  History of depression and anxiety.  Continue with Prozac.  Consider   referral/discussion with psychiatry regarding changing medication with less of   weight gain risk.       ____________________________________     MD BRI MURPHYBV / NTS    DD:  03/20/2017 22:55:26  DT:  03/21/2017 00:24:54    D#:  [id number]  Job#:  [id number]

## 2017-04-04 ENCOUNTER — OFFICE VISIT (OUTPATIENT)
Dept: MEDICAL GROUP | Facility: PHYSICIAN GROUP | Age: 37
End: 2017-04-04
Payer: COMMERCIAL

## 2017-04-04 VITALS
TEMPERATURE: 98.7 F | HEART RATE: 81 BPM | RESPIRATION RATE: 16 BRPM | DIASTOLIC BLOOD PRESSURE: 80 MMHG | OXYGEN SATURATION: 93 % | SYSTOLIC BLOOD PRESSURE: 142 MMHG | BODY MASS INDEX: 48.82 KG/M2 | WEIGHT: 293 LBS | HEIGHT: 65 IN

## 2017-04-04 DIAGNOSIS — E66.01 MORBID OBESITY WITH BMI OF 60.0-69.9, ADULT (HCC): ICD-10-CM

## 2017-04-04 DIAGNOSIS — G25.81 RESTLESS LEGS SYNDROME (RLS): ICD-10-CM

## 2017-04-04 DIAGNOSIS — F32.1 MODERATE SINGLE CURRENT EPISODE OF MAJOR DEPRESSIVE DISORDER (HCC): ICD-10-CM

## 2017-04-04 DIAGNOSIS — F41.9 ANXIETY: ICD-10-CM

## 2017-04-04 DIAGNOSIS — R06.09 DOE (DYSPNEA ON EXERTION): ICD-10-CM

## 2017-04-04 PROCEDURE — 99204 OFFICE O/P NEW MOD 45 MIN: CPT | Performed by: NURSE PRACTITIONER

## 2017-04-04 RX ORDER — HYDROXYZINE 50 MG/1
50 TABLET, FILM COATED ORAL 3 TIMES DAILY PRN
Qty: 90 TAB | Refills: 0 | Status: SHIPPED | OUTPATIENT
Start: 2017-04-04 | End: 2017-12-03

## 2017-04-04 RX ORDER — FLUOXETINE HYDROCHLORIDE 20 MG/1
20 CAPSULE ORAL
Qty: 30 CAP | Refills: 3 | Status: SHIPPED | OUTPATIENT
Start: 2017-04-04 | End: 2017-07-14 | Stop reason: SDUPTHER

## 2017-04-04 RX ORDER — GABAPENTIN 300 MG/1
300 CAPSULE ORAL DAILY
Qty: 30 CAP | Refills: 0 | Status: SHIPPED | OUTPATIENT
Start: 2017-04-04 | End: 2017-05-05 | Stop reason: SDUPTHER

## 2017-04-04 ASSESSMENT — PAIN SCALES - GENERAL: PAINLEVEL: NO PAIN

## 2017-04-04 ASSESSMENT — PATIENT HEALTH QUESTIONNAIRE - PHQ9: CLINICAL INTERPRETATION OF PHQ2 SCORE: 3

## 2017-04-04 NOTE — PROGRESS NOTES
Chief Complaint   Patient presents with   • Anxiety     x 1 month    • Edema     once a month        HISTORY OF THE PRESENT ILLNESS: This is a 36 y.o. female new patient to our practice. This pleasant patient is here today to discuss multiple issues.    Moderate single current episode of major depressive disorder (CMS-[facility])  Patient states that this is a chronic problem. She has had depression for a few years. Patient states that she started Prozac with another healthcare provider at 10 mg daily but had increased her dose to 20 mg daily as she was feeling worse. Patient thinks her symptoms may be related to PMDD, as symptoms are generally worse 5 days before her period patient states that she has never had this diagnosis or been seen by another provider regarding this issue. Patient states that since she has not had a primary care provider she has been getting Prozac, hydroxyzine, Neurontin from her sister, but states that she wants to get established and have her own prescriptions with her name on them. Patient's main symptom of depression is constant fatigue and feeling down all of the time.     Little interest or pleasure in doing things 2   Feeling down, depressed, or hopeless 2  Trouble falling or staying asleep, or sleeping too much 2  Feeling tired or having little energy 1  Poor appetite or overeating 3   Feeling bad about yourself, or that you are a failure, or that you have let yourself or your family down 2  Trouble concentrating on things, such as reading the newspaper or watching television 1  Moving or speaking so slowly that other people could have noticed? Or the opposite, being so fidgety or restless that you have been moving around a lot more than usual. 1  Thoughts that you would be better off dead, or of hurting yourself in some way 1    Total 15 = moderate    Plan to restart Prozac 20 mg daily.    Anxiety  1. Feeling nervous, anxious, or on edge 3  2. Not being able to stop or control worrying 2    3. Worrying too much about different things 1  4. Trouble relaxing 2   5. Being so restless that it is hard to sit still 1  6. Becoming easily annoyed or irritable 3   7. Feeling afraid as if something awful might happen 2     Total score* 14 = moderate    Chronic in nature. Patient states that recently symptoms have been getting more severe as she has been off the medication for about 1 month. Patient states that her anxiety has been well controlled in the past with her Prozac and Hydroxyzine. Patient states she does not have side effects from his medications. Patient's main symptoms of anxiety include becoming easily annoyed or irritable, and feeling nervous insurance or on edge. Patient denies suicidal or homicidal ideation.    MATT (dyspnea on exertion)  Patient states that she was having severe issues with this prior to her hospitalization, but states that at this time has resolved. Patient states that since the edema has been gone she has not had any issues with breathing. States that when she was in the hospital and prior to her hospitalization she was unable to walk more than 10 feet to the bathroom without severe shortness of breath. Oxygen saturation today is 93%, the patient states that she feels well. Cap Refill is less than 3 seconds, re-check of O2 saturation is 95%    Morbid obesity with BMI of 60.0-69.9, adult (CMS-[facility])  Patient is counseled regarding healthy diet and exercise. she is also referred to health improvement program.    Restless legs syndrome (RLS)  Essential diagnostic criteria (all must be met):   1. An urge to move the legs usually but not always accompanied by, or felt to be caused by, uncomfortable and unpleasant sensations in the legs. YES  2. The urge to move the legs and any accompanying unpleasant sensations begin or worsen during periods of rest or inactivity, such as lying down or sitting. YES  3. The urge to move the legs and any accompanying unpleasant sensations are  partially or totally relieved by movement, such as walking or stretching, at least as long as the activity continues. YES   4. The urge to move the legs and any accompanying unpleasant sensations during rest or inactivity only occur or are worse in the evening or night than during the day.YES  5. The occurrence of the above features is not solely accounted for as symptoms primary to another medical or a behavioral condition (eg, myalgia, venous stasis, leg edema, arthritis, leg cramps, positional discomfort, habitual foot tapping) YES    Patient says condition causes her difficulty with sleep, and that the sensation as a painful and unpleasant one.        Past Medical History   Diagnosis Date   • MDD (major depressive disorder)    • Dyspnea 2017       Past Surgical History   Procedure Laterality Date   • Gastric resection  2012   • Cholecystectomy         Family Status   Relation Status Death Age   • Mother Alive    • Father Alive      Family History   Problem Relation Age of Onset   • Alcohol/Drug Mother    • Psychiatry Mother    • Heart Disease Father 50     stents   • Diabetes Maternal Grandmother    • Heart Disease Paternal Grandfather        Social History   Substance Use Topics   • Smoking status: Former Smoker -- 0.00 packs/day     Quit date: 02/04/2017   • Smokeless tobacco: Never Used   • Alcohol Use: No       Allergies: Review of patient's allergies indicates no known allergies.    Current Outpatient Prescriptions Ordered in [facility]   Medication Sig Dispense Refill   • fluoxetine (PROZAC) 20 MG Cap Take 1 Cap by mouth every day. 30 Cap 3   • hydrOXYzine (ATARAX) 50 MG Tab Take 1 Tab by mouth 3 times a day as needed for Anxiety. 90 Tab 0   • gabapentin (NEURONTIN) 300 MG Cap Take 1 Cap by mouth every day. 30 Cap 0     No current Epic-ordered facility-administered medications on file.       Review of Systems   Constitutional:  Negative for fever, chills, weight loss and malaise/fatigue.   HENT:  Negative for  "ear pain, nosebleeds, congestion, sore throat and neck pain.    Eyes:  Negative for blurred vision.   Respiratory:  Negative for cough, sputum production, shortness of breath and wheezing.    Cardiovascular:  Negative for chest pain, palpitations, orthopnea and leg swelling.   Gastrointestinal: Negative for heartburn, nausea, vomiting and abdominal pain.   Genitourinary:  Negative for dysuria, urgency and frequency.   Musculoskeletal:  Negative for myalgias, back pain and joint pain.   Skin:  Negative for rash and itching.   Neurological:  Negative for dizziness, tingling, tremors, sensory change, focal weakness and headaches.   Endo/Heme/Allergies:  Does not bruise/bleed easily.   Psychiatric/Behavioral:  Negative for depression, anxiety, or memory loss.     All other systems reviewed and are negative except as in HPI.    Exam: Blood pressure 142/80, pulse 81, temperature 37.1 °C (98.7 °F), resp. rate 16, height 1.651 m (5' 5\"), weight 163.749 kg (361 lb), last menstrual period 03/19/2017, SpO2 93 %, not currently breastfeeding.  General:  Normal appearing. No distress. Obese.  HEENT:  Normocephalic. Eyes conjunctiva clear lids without ptosis, pupils equal and reactive to light accommodation, ears normal shape and contour, canals are clear bilaterally, tympanic membranes are benign, nasal mucosa benign, oropharynx is without erythema, edema or exudates.   Neck:  Supple without JVD or bruit. Thyroid is not enlarged.  Pulmonary:  Clear to ausculation.  Normal effort. No rales, ronchi, or wheezing.  Cardiovascular:  Regular rate and rhythm without murmur. Carotid and radial pulses are intact and equal bilaterally.  Abdomen:  Soft, nontender, nondistended. Normal bowel sounds. Liver and spleen are not palpable  Neurologic:  Grossly nonfocal  Lymph:  No cervical, supraclavicular or axillary lymph nodes are palpable  Skin:  Warm and dry.  No obvious lesions.  Musculoskeletal:  Normal gait. No extremity cyanosis, " clubbing, or edema.  Psych:  Normal mood and affect. Alert and oriented x3. Judgment and insight is normal.    Medical decision making:  Sonya is an obese 36 year-old female here today to establish care and follow-up after hospital stay for dyspnea on exertion and peripheral edema. Patient does not currently have symptoms or edema on assessment. As such patient is referred to pulmonology per recommendation of hospital physician. With regards to depression and anxiety patient is prescribed fluoxetine 20 mg daily and Atarax 50 mg up to 3 times daily as needed for anxiety. Patient is also prescribed gabapentin 300 mg once daily for restless leg syndrome. Referral for health improvement program related to morbid obesity is also provided at this time. Will follow up in one month, we'll consider further referrals to cardiology if edema returns or MATT becomes worse, behavioral health if patient continues to have increasing symptoms of anxiety depression. Patient's blood pressure is noted to be 142/80 at this visit. Patient did have previous elevated blood pressures in the hospital which resolved after her anxiety and swelling decreased. Patient will monitor blood pressure at home over the next month and if blood pressure continues to be greater than 140 on the top or greater than 80 on the bottom plan will be to start medication for blood pressure. Patient is encouraged to be seen in the emergency room for chest pain, palpitations, shortness of breath, dizziness, severe abdominal pain or other concerning symptoms.    Please note that this dictation was created using voice recognition software. I have made every reasonable attempt to correct obvious errors, but I expect that there are errors of grammar and possibly content that I did not discover before finalizing the note.      Assessment/Plan  1. Moderate single current episode of major depressive disorder (CMS-[facility])  fluoxetine (PROZAC) 20 MG Cap   2. Anxiety   fluoxetine (PROZAC) 20 MG Cap    hydrOXYzine (ATARAX) 50 MG Tab   3. Morbid obesity with BMI of 60.0-69.9, adult (CMS-[facility])  Patient identified as having weight management issue.  Appropriate orders and counseling given.    REFERRAL TO [facility] IMPROVEMENT PROGRAMS (HIP) Services Requested:: General-HIP Staff to Evaluate Best Program; Reason for Visit:: Overweight/Obesity   4. MATT (dyspnea on exertion)  REFERRAL TO PULMONOLOGY   5. Restless legs syndrome (RLS)  gabapentin (NEURONTIN) 300 MG Cap         I have placed the below orders and discussed them with an approved delegating provider. The MA is performing the below orders under the direction of Dr. Ahumada.

## 2017-04-04 NOTE — MR AVS SNAPSHOT
"        Sonya CASH Sesandro   2017 7:00 AM   Office Visit   MRN: [id number]    Department:  [facility]   Dept Phone:  [phone]    Description:  Female : 1980   Provider:  RANJAN Freire           Reason for Visit     Anxiety x 1 month     Edema once a month       Allergies as of 2017     No Known Allergies      You were diagnosed with     Moderate single current episode of major depressive disorder (CMS-HCC)   [8024008]       Anxiety   [080556]       Morbid obesity with BMI of 60.0-69.9, adult (CMS-HCC)   [297787]       MATT (dyspnea on exertion)   [736162]       Restless legs syndrome (RLS)   [333.94.ICD-9-CM]         Vital Signs     Blood Pressure Pulse Temperature Respirations Height Weight    142/80 mmHg 81 37.1 °C (98.7 °F) 16 1.651 m (5' 5\") 163.749 kg (361 lb)    Body Mass Index Oxygen Saturation Last Menstrual Period Breastfeeding? Smoking Status       60.07 kg/m2 93% 2017 No Former Smoker       Basic Information     Date Of Birth Sex Race Ethnicity Preferred Language    1980 Female White Non- English      Your appointments     May 02, 2017  7:00 AM   Established Patient with RANJAN Freire   [facility] (--)    [address]  [address]  [facility] [id number] [phone]           You will be receiving a confirmation call a few days before your appointment from our automated call confirmation system.              Problem List              ICD-10-CM Priority Class Noted - Resolved    MATT (dyspnea on exertion) R06.09   3/20/2017 - Present    Moderate single current episode of major depressive disorder (CMS-HCC) F32.1   2017 - Present    Anxiety F41.9   2017 - Present      Health Maintenance        Date Due Completion Dates    PAP SMEAR 2002 ---    IMM DTaP/Tdap/Td Vaccine (2 - Td) 2017            Current Immunizations     Hep A/HEP B Combined Vaccine ([employer]) 2007, 2007, " 6/14/2007, 4/26/2007, 4/26/2007, 4/26/2007    MMR Vaccine 4/28/2011    Tdap Vaccine 4/26/2007    Tuberculin Skin Test 9/12/2014, 9/5/2014      Below and/or attached are the medications your provider expects you to take. Review all of your home medications and newly ordered medications with your provider and/or pharmacist. Follow medication instructions as directed by your provider and/or pharmacist. Please keep your medication list with you and share with your provider. Update the information when medications are discontinued, doses are changed, or new medications (including over-the-counter products) are added; and carry medication information at all times in the event of emergency situations     Allergies:  No Known Allergies          Medications  Valid as of: April 04, 2017 -  7:58 AM    Generic Name Brand Name Tablet Size Instructions for use    FLUoxetine HCl (Cap) PROZAC 20 MG Take 1 Cap by mouth every day.        Gabapentin (Cap) NEURONTIN 300 MG Take 1 Cap by mouth every day.        HydrOXYzine HCl (Tab) ATARAX 50 MG Take 1 Tab by mouth 3 times a day as needed for Anxiety.        .                 Medicines prescribed today were sent to:     [facility] [address] [place] [address] - [address] [place] [address]    Phone: [phone] Fax: [phone]    Open 24 Hours?: No      Medication refill instructions:       If your prescription bottle indicates you have medication refills left, it is not necessary to call your provider’s office. Please contact your pharmacy and they will refill your medication.    If your prescription bottle indicates you do not have any refills left, you may request refills at any time through one of the following ways: The online [employer] system (except Urgent Care), by calling your provider’s office, or by asking your pharmacy to contact your provider’s office with a refill request. Medication refills are processed only during regular business hours  and may not be available until the next business day. Your provider may request additional information or to have a follow-up visit with you prior to refilling your medication.   *Please Note: Medication refills are assigned a new Rx number when refilled electronically. Your pharmacy may indicate that no refills were authorized even though a new prescription for the same medication is available at the pharmacy. Please request the medicine by name with the pharmacy before contacting your provider for a refill.        Referral     A referral request has been sent to our patient care coordination department. Please allow 3-5 business days for us to process this request and contact you either by phone or mail. If you do not hear from us by the 5th business day, please call us at [phone].           [employer] Access Code: [email or web address]  Expires: 5/4/2017  7:58 AM    [employer]  A secure, online tool to manage your health information     [employer]® is a secure, online tool that connects you to your personalized health information from the privacy of your home -- day or night - making it very easy for you to manage your healthcare. Once the activation process is completed, you can even access your medical information using the [employer] danuta, which is available for free in the Apple Danuta store or Google Play store.     [employer] provides the following levels of access (as shown below):   My Chart Features   [facility] Primary [facility] Doctor [facility]  Specialists [facility]  Urgent  Care Non-[facility]  Primary Care  Doctor   Email your healthcare team securely and privately 24/7 X X X    Manage appointments: schedule your next appointment; view details of past/upcoming appointments X      Request prescription refills. X      View recent personal medical records, including lab and immunizations X X X X   View health record, including health history, allergies, medications X X X X   Read reports about your outpatient visits,  procedures, consult and ER notes X X X X   See your discharge summary, which is a recap of your hospital and/or ER visit that includes your diagnosis, lab results, and care plan. X X       How to register for [employer]:  1. Go to  [email or web address].  2. Click on the Sign Up Now box, which takes you to the New Member Sign Up page. You will need to provide the following information:  a. Enter your [employer] Access Code exactly as it appears at the top of this page. (You will not need to use this code after you’ve completed the sign-up process. If you do not sign up before the expiration date, you must request a new code.)   b. Enter your date of birth.   c. Enter your home email address.   d. Click Submit, and follow the next screen’s instructions.  3. Create a [employer] ID. This will be your [employer] login ID and cannot be changed, so think of one that is secure and easy to remember.  4. Create a [employer] password. You can change your password at any time.  5. Enter your Password Reset Question and Answer. This can be used at a later time if you forget your password.   6. Enter your e-mail address. This allows you to receive e-mail notifications when new information is available in [email or web address].  7. Click Sign Up. You can now view your health information.    For assistance activating your [employer] account, call [phone]

## 2017-04-04 NOTE — ASSESSMENT & PLAN NOTE
1. Feeling nervous, anxious, or on edge 3  2. Not being able to stop or control worrying 2   3. Worrying too much about different things 1  4. Trouble relaxing 2   5. Being so restless that it is hard to sit still 1  6. Becoming easily annoyed or irritable 3   7. Feeling afraid as if something awful might happen 2     Total score* 14 = moderate    Chronic in nature. Patient states that recently symptoms have been getting more severe as she has been off the medication for about 1 month. Patient states that her anxiety has been well controlled in the past with her Prozac and Hydroxyzine. Patient states she does not have side effects from his medications. Patient's main symptoms of anxiety include becoming easily annoyed or irritable, and feeling nervous insurance or on edge. Patient denies suicidal or homicidal ideation.

## 2017-04-04 NOTE — ASSESSMENT & PLAN NOTE
Patient is counseled regarding healthy diet and exercise. she is also referred to health improvement program.

## 2017-04-04 NOTE — ASSESSMENT & PLAN NOTE
Patient states that she was having severe issues with this prior to her hospitalization, but states that at this time has resolved. Patient states that since the edema has been gone she has not had any issues with breathing. States that when she was in the hospital and prior to her hospitalization she was unable to walk more than 10 feet to the bathroom without severe shortness of breath. Oxygen saturation today is 93%, the patient states that she feels well. Cap Refill is less than 3 seconds, re-check of O2 saturation is 95%

## 2017-04-04 NOTE — ASSESSMENT & PLAN NOTE
Essential diagnostic criteria (all must be met):   1. An urge to move the legs usually but not always accompanied by, or felt to be caused by, uncomfortable and unpleasant sensations in the legs. YES  2. The urge to move the legs and any accompanying unpleasant sensations begin or worsen during periods of rest or inactivity, such as lying down or sitting. YES  3. The urge to move the legs and any accompanying unpleasant sensations are partially or totally relieved by movement, such as walking or stretching, at least as long as the activity continues. YES   4. The urge to move the legs and any accompanying unpleasant sensations during rest or inactivity only occur or are worse in the evening or night than during the day.YES  5. The occurrence of the above features is not solely accounted for as symptoms primary to another medical or a behavioral condition (eg, myalgia, venous stasis, leg edema, arthritis, leg cramps, positional discomfort, habitual foot tapping) YES    Patient says condition causes her difficulty with sleep, and that the sensation as a painful and unpleasant one.

## 2017-04-04 NOTE — ASSESSMENT & PLAN NOTE
Patient states that this is a chronic problem. She has had depression for a few years. Patient states that she started Prozac with another healthcare provider at 10 mg daily but had increased her dose to 20 mg daily as she was feeling worse. Patient thinks her symptoms may be related to PMDD, as symptoms are generally worse 5 days before her period patient states that she has never had this diagnosis or been seen by another provider regarding this issue. Patient states that since she has not had a primary care provider she has been getting Prozac, hydroxyzine, Neurontin from her sister, but states that she wants to get established and have her own prescriptions with her name on them. Patient's main symptom of depression is constant fatigue and feeling down all of the time.     Little interest or pleasure in doing things 2   Feeling down, depressed, or hopeless 2  Trouble falling or staying asleep, or sleeping too much 2  Feeling tired or having little energy 1  Poor appetite or overeating 3   Feeling bad about yourself, or that you are a failure, or that you have let yourself or your family down 2  Trouble concentrating on things, such as reading the newspaper or watching television 1  Moving or speaking so slowly that other people could have noticed? Or the opposite, being so fidgety or restless that you have been moving around a lot more than usual. 1  Thoughts that you would be better off dead, or of hurting yourself in some way 1    Total 15 = moderate    Plan to restart Prozac 20 mg daily.

## 2017-05-05 RX ORDER — GABAPENTIN 300 MG/1
CAPSULE ORAL
Qty: 30 CAP | Refills: 0 | Status: SHIPPED | OUTPATIENT
Start: 2017-05-05 | End: 2017-06-08 | Stop reason: SDUPTHER

## 2017-05-09 ENCOUNTER — TELEPHONE (OUTPATIENT)
Dept: MEDICAL GROUP | Facility: PHYSICIAN GROUP | Age: 37
End: 2017-05-09

## 2017-05-10 ENCOUNTER — APPOINTMENT (OUTPATIENT)
Dept: MEDICAL GROUP | Facility: PHYSICIAN GROUP | Age: 37
End: 2017-05-10
Payer: COMMERCIAL

## 2017-06-08 RX ORDER — GABAPENTIN 300 MG/1
CAPSULE ORAL
Qty: 30 CAP | Refills: 0 | Status: SHIPPED | OUTPATIENT
Start: 2017-06-08 | End: 2017-07-14 | Stop reason: SDUPTHER

## 2017-07-14 ENCOUNTER — OFFICE VISIT (OUTPATIENT)
Dept: MEDICAL GROUP | Facility: PHYSICIAN GROUP | Age: 37
End: 2017-07-14
Payer: COMMERCIAL

## 2017-07-14 VITALS
OXYGEN SATURATION: 95 % | HEIGHT: 65 IN | SYSTOLIC BLOOD PRESSURE: 142 MMHG | BODY MASS INDEX: 48.82 KG/M2 | TEMPERATURE: 98.1 F | DIASTOLIC BLOOD PRESSURE: 90 MMHG | WEIGHT: 293 LBS | RESPIRATION RATE: 16 BRPM | HEART RATE: 85 BPM

## 2017-07-14 DIAGNOSIS — I10 ESSENTIAL HYPERTENSION: ICD-10-CM

## 2017-07-14 DIAGNOSIS — K42.9 UMBILICAL HERNIA WITHOUT OBSTRUCTION AND WITHOUT GANGRENE: ICD-10-CM

## 2017-07-14 DIAGNOSIS — R06.83 SNORING: ICD-10-CM

## 2017-07-14 DIAGNOSIS — F41.9 ANXIETY: ICD-10-CM

## 2017-07-14 DIAGNOSIS — G25.81 RESTLESS LEGS SYNDROME (RLS): ICD-10-CM

## 2017-07-14 DIAGNOSIS — E66.01 MORBID OBESITY WITH BMI OF 60.0-69.9, ADULT (HCC): ICD-10-CM

## 2017-07-14 DIAGNOSIS — F32.1 MODERATE SINGLE CURRENT EPISODE OF MAJOR DEPRESSIVE DISORDER (HCC): ICD-10-CM

## 2017-07-14 PROBLEM — R06.09 DOE (DYSPNEA ON EXERTION): Status: RESOLVED | Noted: 2017-03-20 | Resolved: 2017-07-14

## 2017-07-14 PROCEDURE — 99214 OFFICE O/P EST MOD 30 MIN: CPT | Performed by: NURSE PRACTITIONER

## 2017-07-14 RX ORDER — FLUOXETINE HYDROCHLORIDE 20 MG/1
20 CAPSULE ORAL
Qty: 90 CAP | Refills: 3 | Status: SHIPPED | OUTPATIENT
Start: 2017-07-14 | End: 2018-06-18 | Stop reason: SDUPTHER

## 2017-07-14 RX ORDER — GABAPENTIN 300 MG/1
CAPSULE ORAL
Qty: 90 CAP | Refills: 3 | Status: SHIPPED | OUTPATIENT
Start: 2017-07-14 | End: 2017-12-03

## 2017-07-14 RX ORDER — HYDROCHLOROTHIAZIDE 12.5 MG/1
12.5 TABLET ORAL DAILY
Qty: 30 TAB | Refills: 0 | Status: SHIPPED | OUTPATIENT
Start: 2017-07-14 | End: 2017-08-10

## 2017-07-14 ASSESSMENT — PAIN SCALES - GENERAL: PAINLEVEL: NO PAIN

## 2017-07-14 NOTE — PROGRESS NOTES
Chief Complaint   Patient presents with   • Medication Refill   • Edema     Feet;Hands;Face        HISTORY OF PRESENT ILLNESS: Patient is a 36 y.o. female established patient who presents today to discuss multiple issues.    Moderate single current episode of major depressive disorder (CMS-[facility])  Current symptoms include:decreased Energy depressed mood, weight gain and insomnia none  Since last OV, symptoms are better  Taking Prozac 20 mg daily as directed. Denies bothersome side effects.  Denies SI/HI. (Denies wishing to be dead, thinking about death, intent to commit suicide, previous suicide attempt)  Denies symptoms of brigido: grandiosity, euphoria, need for little or no sleep, rapid pressured speech, spending sprees, reckless or risky behavior, hypersexual behavior.    Risk factors: positive family history psychiatric disorder      Review Of Systems  No polydipsia, polyuria.  No significant weight changes.  No temperature intolerance.  No bowel changes  Denies symptoms of brigido: grandiosity, euphoria, need for little or no sleep, rapid pressured speech, spending sprees, reckless or risky behavior, hypersexual behavior.   No visual or auditory hallucinations.    Anxiety  Chronic in nature. Stable. Improved on 20 mg Prozac daily. Patient states that she has taken Atarax 50 mg a couple of times per day which has significantly improved her symptoms.    Restless legs syndrome (RLS)  Chronic in nature. Stable. Patient states that 300 mg gabapentin nightly is working well to control symptoms. Patient states that she has been easily able to sleep since starting this medication. Plan to continue this treatment plan.    Morbid obesity with BMI of 60.0-69.9, adult (CMS-[facility])  Chronic in nature. Stable. Patient is counseled regarding healthy diet and exercise. Patient has been referred to health improvement program but states that she has not made any appointments with regard to this referral.    Essential hypertension  This  is a new problem. Patient's blood pressure was 142/80 in April and is currently 142/90. Patient states that in the interim she has noticed multiple elevated blood pressures. Plan is to start patient on hydrochlorothiazide 12.5 mg daily to be taken in the morning. Patient does continue to have swelling in her hands and feet which she states is concerning. Patient states that following coffee use her hands and feet to be achy.     Snoring  Chronic in nature. Worsening. Patient states that his snoring has become more frequent and that she has noticed being really tired throughout the day and in the morning. Patient states that she is unsure if she has gasping episodes at night as she sleeps alone. He is requesting referral to pulmonology for sleep study.    Umbilical hernia without obstruction and without gangrene  Chronic in nature. Patient notes that over the last year or so she has had a soft squishy lump in the center of her abdomen above her umbilicus. He states that she does notice it will come and go depending on her physician states that she does notice it more prominently when she coughs. She does have a history of abdominal surgeries.       Patient Active Problem List    Diagnosis Date Noted   • Essential hypertension 07/14/2017   • Snoring 07/14/2017   • Umbilical hernia without obstruction and without gangrene 07/14/2017   • Moderate single current episode of major depressive disorder (CMS-HCC) 04/04/2017   • Anxiety 04/04/2017   • Restless legs syndrome (RLS) 04/04/2017   • Morbid obesity with BMI of 60.0-69.9, adult (CMS-HCC) 04/04/2017       Allergies:Review of patient's allergies indicates no known allergies.    Current Outpatient Prescriptions   Medication Sig Dispense Refill   • gabapentin (NEURONTIN) 300 MG Cap TAKE ONE CAPSULE BY MOUTH ONCE DAILY 90 Cap 3   • fluoxetine (PROZAC) 20 MG Cap Take 1 Cap by mouth every day. 90 Cap 3   • hydrochlorothiazide (HYDRODIURIL) 12.5 MG tablet Take 1 Tab by mouth  "every day. 30 Tab 0   • hydrOXYzine (ATARAX) 50 MG Tab Take 1 Tab by mouth 3 times a day as needed for Anxiety. 90 Tab 0     No current facility-administered medications for this visit.       Social History   Substance Use Topics   • Smoking status: Former Smoker -- 0.00 packs/day     Quit date: 02/04/2017   • Smokeless tobacco: Never Used   • Alcohol Use: No       Family Status   Relation Status Death Age   • Mother Alive    • Father Alive      Family History   Problem Relation Age of Onset   • Alcohol/Drug Mother    • Psychiatry Mother    • Heart Disease Father 50     stents   • Diabetes Maternal Grandmother    • Heart Disease Paternal Grandfather        Review of Systems:   Constitutional:  Negative for fever, chills, weight loss and malaise/fatigue.   HEENT:  Negative for ear pain, nosebleeds, congestion, sore throat and neck pain.    Eyes:  Negative for blurred vision.   Respiratory:  Negative for cough, sputum production, shortness of breath and wheezing.    Cardiovascular:  Negative for chest pain, palpitations, orthopnea and positive leg swelling.   Gastrointestinal:  Negative for heartburn, nausea, vomiting and abdominal pain.   Genitourinary:  Negative for dysuria, urgency and frequency.   Musculoskeletal: Positive for myalgias, back pain and joint pain.   Skin:  Negative for rash and itching.   Neurological:  Negative for dizziness, tingling, tremors, sensory change, focal weakness and headaches.   Endo/Heme/Allergies:  Does not bruise/bleed easily.   Psychiatric/Behavioral:  Negative for depression, suicidal ideas and memory loss.  The patient is not nervous/anxious and does not have insomnia.    All other systems reviewed and are negative except as in HPI.    Exam:  Blood pressure 142/90, pulse 85, temperature 36.7 °C (98.1 °F), resp. rate 16, height 1.651 m (5' 5\"), weight 167.377 kg (369 lb), last menstrual period 07/13/2017, SpO2 95 %, not currently breastfeeding.  General:  Normal appearing. No " distress.  Pulmonary:  Clear to ausculation.  Normal effort. No rales, ronchi, or wheezing.  Cardiovascular:  Regular rate and rhythm without murmur. Carotid and radial pulses are intact and equal bilaterally.  Abdomen:  Soft, nontender, nondistended. There is a soft swelling noted above patient's umbilicus which could represent hernia. Normal bowel sounds. Liver and spleen are not palpable.  Neurologic:  Grossly nonfocal  Lymph:  No cervical, supraclavicular or axillary lymph nodes are palpable  Skin:  Warm and dry.  No obvious lesions.  Musculoskeletal:  Normal gait. No extremity cyanosis, clubbing, or +2+ edema in bilateral feet, hands are noted to be swollen as well. No pitting noted in bilateral hands.  Psych:  Normal mood and affect. Alert and oriented x3. Judgment and insight is normal.      Medical decision-making and discussion: Sonya galindo is a generally well-appearing 36-year-old obese female patient here today to follow up regarding multiple issues. With regard to restless leg syndrome patient is doing well on gabapentin. With regard to depression and anxiety patient has not needed Atarax since she stabilized on Prozac 20 mg which is managing her symptoms of depression and anxiety. With regard to her elevated blood pressure hydrochlorothiazide 12.5 mg daily is provided at this time patient is counseled regarding the benefits, risks of this medication as well as possible side effects. Patient will follow-up in one month or as needed to discuss his medication. Patient is also referred to pulmonology for evaluation of possible sleep apnea. Abdominal ultrasound is ordered to evaluate possible hernia. Patient is encouraged to be seen in the emergency room for chest pain, palpitations, shortness of breath, dizziness, severe abdominal pain or other concerning symptoms.    Please note that this dictation was created using voice recognition software. I have made every reasonable attempt to correct obvious errors,  but I expect that there are errors of grammar and possibly content that I did not discover before finalizing the note.    Assessment/Plan:  1. Moderate single current episode of major depressive disorder (CMS-HCC)  fluoxetine (PROZAC) 20 MG Cap   2. Anxiety  fluoxetine (PROZAC) 20 MG Cap   3. Restless legs syndrome (RLS)  gabapentin (NEURONTIN) 300 MG Cap   4. Essential hypertension  hydrochlorothiazide (HYDRODIURIL) 12.5 MG tablet   5. Umbilical hernia without obstruction and without gangrene  US-ABDOMEN COMPLETE SURVEY    CANCELED: REFERRAL TO GENERAL SURGERY   6. Snoring  REFERRAL TO PULMONOLOGY   7. Morbid obesity with BMI of 60.0-69.9, adult (CMS-[facility])            I have placed the below orders and discussed them with an approved delegating provider. The MA is performing the below orders under the direction of Dr. Finn.

## 2017-07-14 NOTE — ASSESSMENT & PLAN NOTE
Chronic in nature. Stable. Improved on 20 mg Prozac daily. Patient states that she has taken Atarax 50 mg a couple of times per day which has significantly improved her symptoms.

## 2017-07-14 NOTE — ASSESSMENT & PLAN NOTE
Chronic in nature. Patient notes that over the last year or so she has had a soft squishy lump in the center of her abdomen above her umbilicus. He states that she does notice it will come and go depending on her physician states that she does notice it more prominently when she coughs. She does have a history of abdominal surgeries.

## 2017-07-14 NOTE — ASSESSMENT & PLAN NOTE
Current symptoms include:decreased Energy depressed mood, weight gain and insomnia none  Since last OV, symptoms are better  Taking Prozac 20 mg daily as directed. Denies bothersome side effects.  Denies SI/HI. (Denies wishing to be dead, thinking about death, intent to commit suicide, previous suicide attempt)  Denies symptoms of brigido: grandiosity, euphoria, need for little or no sleep, rapid pressured speech, spending sprees, reckless or risky behavior, hypersexual behavior.    Risk factors: positive family history psychiatric disorder      Review Of Systems  No polydipsia, polyuria.  No significant weight changes.  No temperature intolerance.  No bowel changes  Denies symptoms of brigido: grandiosity, euphoria, need for little or no sleep, rapid pressured speech, spending sprees, reckless or risky behavior, hypersexual behavior.   No visual or auditory hallucinations.

## 2017-07-14 NOTE — ASSESSMENT & PLAN NOTE
This is a new problem. Patient's blood pressure was 142/80 in April and is currently 142/90. Patient states that in the interim she has noticed multiple elevated blood pressures. Plan is to start patient on hydrochlorothiazide 12.5 mg daily to be taken in the morning. Patient does continue to have swelling in her hands and feet which she states is concerning. Patient states that following coffee use her hands and feet to be achy.

## 2017-07-14 NOTE — Clinical Note
July 14, 2017         Patient: Sonya Flor   YOB: 1980   Date of Visit: 7/14/2017           To Whom it May Concern:    Sonya Flor was seen in my clinic on 7/14/2017. She Return again 08/18/2017.    If you have any questions or concerns, please don't hesitate to call.        Sincerely,           LAXMI Freire.  Electronically Signed

## 2017-07-14 NOTE — MR AVS SNAPSHOT
"        Sonya Flor   2017 10:40 AM   Office Visit   MRN: [id number]    Department:  [facility]   Dept Phone:  [phone]    Description:  Female : 1980   Provider:  RANJAN Freire           Reason for Visit     Medication Refill     Edema Feet;Hands;Face       Allergies as of 2017     No Known Allergies      You were diagnosed with     Moderate single current episode of major depressive disorder (CMS-HCC)   [5878375]       Anxiety   [765308]       Restless legs syndrome (RLS)   [333.94.ICD-9-CM]       Essential hypertension   [3090631]       Umbilical hernia without obstruction and without gangrene   [1624620]       Snoring   [409410]         Vital Signs     Blood Pressure Pulse Temperature Respirations Height Weight    142/90 mmHg 85 36.7 °C (98.1 °F) 16 1.651 m (5' 5\") 167.377 kg (369 lb)    Body Mass Index Oxygen Saturation Last Menstrual Period Breastfeeding? Smoking Status       61.40 kg/m2 95% 2017 No Former Smoker       Basic Information     Date Of Birth Sex Race Ethnicity Preferred Language    1980 Female White Non- English      Your appointments     Aug 18, 2017 11:00 AM   Established Patient with RANJAN Freire   [facility] (--)    [address]  [address]  [place] [id number] [phone]           You will be receiving a confirmation call a few days before your appointment from our automated call confirmation system.              Problem List              ICD-10-CM Priority Class Noted - Resolved    MATT (dyspnea on exertion) R06.09   3/20/2017 - Present    Moderate single current episode of major depressive disorder (CMS-HCC) F32.1   2017 - Present    Anxiety F41.9   2017 - Present    Restless legs syndrome (RLS) G25.81   2017 - Present    Morbid obesity with BMI of 60.0-69.9, adult (CMS-HCC) E66.01, Z68.44   2017 - Present      Health Maintenance        Date Due Completion Dates "    PAP SMEAR 12/14/2002 ---    IMM DTaP/Tdap/Td Vaccine (2 - Td) 4/26/2017 4/26/2007    IMM INFLUENZA (1) 9/1/2017 ---            Current Immunizations     Hep A/HEP B Combined Vaccine ([employer]) 6/14/2007, 6/14/2007, 6/14/2007, 4/26/2007, 4/26/2007, 4/26/2007    MMR Vaccine 4/28/2011    Tdap Vaccine 4/26/2007    Tuberculin Skin Test 9/12/2014, 9/5/2014      Below and/or attached are the medications your provider expects you to take. Review all of your home medications and newly ordered medications with your provider and/or pharmacist. Follow medication instructions as directed by your provider and/or pharmacist. Please keep your medication list with you and share with your provider. Update the information when medications are discontinued, doses are changed, or new medications (including over-the-counter products) are added; and carry medication information at all times in the event of emergency situations     Allergies:  No Known Allergies          Medications  Valid as of: July 14, 2017 - 11:28 AM    Generic Name Brand Name Tablet Size Instructions for use    FLUoxetine HCl (Cap) PROZAC 20 MG Take 1 Cap by mouth every day.        Gabapentin (Cap) NEURONTIN 300 MG TAKE ONE CAPSULE BY MOUTH ONCE DAILY        HydroCHLOROthiazide (Tab) HYDRODIURIL 12.5 MG Take 1 Tab by mouth every day.        HydrOXYzine HCl (Tab) ATARAX 50 MG Take 1 Tab by mouth 3 times a day as needed for Anxiety.        .                 Medicines prescribed today were sent to:     [facility] [address] [place] [id number]    Phone: [phone] Fax: [phone]    Open 24 Hours?: No      Medication refill instructions:       If your prescription bottle indicates you have medication refills left, it is not necessary to call your provider’s office. Please contact your pharmacy and they will refill your medication.    If your prescription bottle indicates you do not have any refills left,  you may request refills at any time through one of the following ways: The online SendMe system (except Urgent Care), by calling your provider’s office, or by asking your pharmacy to contact your provider’s office with a refill request. Medication refills are processed only during regular business hours and may not be available until the next business day. Your provider may request additional information or to have a follow-up visit with you prior to refilling your medication.   *Please Note: Medication refills are assigned a new Rx number when refilled electronically. Your pharmacy may indicate that no refills were authorized even though a new prescription for the same medication is available at the pharmacy. Please request the medicine by name with the pharmacy before contacting your provider for a refill.        Your To Do List     Future Labs/Procedures Complete By Expires    US-ABDOMEN COMPLETE SURVEY  As directed 7/14/2018      Referral     A referral request has been sent to our patient care coordination department. Please allow 3-5 business days for us to process this request and contact you either by phone or mail. If you do not hear from us by the 5th business day, please call us at [phone].           SendMe Access Code: [email or web address]  Expires: 8/13/2017 11:28 AM    SendMe  A secure, online tool to manage your health information     Currently’s SendMe® is a secure, online tool that connects you to your personalized health information from the privacy of your home -- day or night - making it very easy for you to manage your healthcare. Once the activation process is completed, you can even access your medical information using the SendMe danuta, which is available for free in the Apple Danuta store or Google Play store.     SendMe provides the following levels of access (as shown below):   My Chart Features   [facility] Doctor [facility]ists [facility]  Urgent  Care  [facility]  Primary [facility]  Doctor   Email your healthcare team securely and privately 24/7 X X X    Manage appointments: schedule your next appointment; view details of past/upcoming appointments X      Request prescription refills. X      View recent personal medical records, including lab and immunizations X X X X   View health record, including health history, allergies, medications X X X X   Read reports about your outpatient visits, procedures, consult and ER notes X X X X   See your discharge summary, which is a recap of your hospital and/or ER visit that includes your diagnosis, lab results, and care plan. X X       How to register for [email or web address]:  1. Go to  [email or web address].  2. Click on the Sign Up Now box, which takes you to the New Member Sign Up page. You will need to provide the following information:  a. Enter your [email or web address] Access Code exactly as it appears at the top of this page. (You will not need to use this code after you’ve completed the sign-up process. If you do not sign up before the expiration date, you must request a new code.)   b. Enter your date of birth.   c. Enter your home email address.   d. Click Submit, and follow the next screen’s instructions.  3. Create a snapp.me ID. This will be your [email or web address] login ID and cannot be changed, so think of one that is secure and easy to remember.  4. Create a snapp.me password. You can change your password at any time.  5. Enter your Password Reset Question and Answer. This can be used at a later time if you forget your password.   6. Enter your e-mail address. This allows you to receive e-mail notifications when new information is available in [email or web address].  7. Click Sign Up. You can now view your health information.    For assistance activating your [email or web address] account, call [phone]

## 2017-07-14 NOTE — ASSESSMENT & PLAN NOTE
Chronic in nature. Stable. Patient is counseled regarding healthy diet and exercise. Patient has been referred to health improvement program but states that she has not made any appointments with regard to this referral.

## 2017-07-14 NOTE — ASSESSMENT & PLAN NOTE
Chronic in nature. Stable. Patient states that 300 mg gabapentin nightly is working well to control symptoms. Patient states that she has been easily able to sleep since starting this medication. Plan to continue this treatment plan.

## 2017-08-08 ENCOUNTER — TELEPHONE (OUTPATIENT)
Dept: MEDICAL GROUP | Facility: PHYSICIAN GROUP | Age: 37
End: 2017-08-08

## 2017-08-08 ENCOUNTER — HOSPITAL ENCOUNTER (OUTPATIENT)
Dept: RADIOLOGY | Facility: MEDICAL CENTER | Age: 37
End: 2017-08-08
Attending: NURSE PRACTITIONER
Payer: COMMERCIAL

## 2017-08-08 DIAGNOSIS — R93.5 ABNORMAL ABDOMINAL ULTRASOUND: ICD-10-CM

## 2017-08-08 DIAGNOSIS — K42.9 UMBILICAL HERNIA WITHOUT OBSTRUCTION AND WITHOUT GANGRENE: ICD-10-CM

## 2017-08-08 PROCEDURE — 76705 ECHO EXAM OF ABDOMEN: CPT

## 2017-08-08 NOTE — TELEPHONE ENCOUNTER
Patient called and left a . Called and spoke with patient regarding lab results. Patient said her previous surgeon was in [facility]. Patient will need a referral to general surgery. Patient would like a call back from Bon on clarification on what is it.I told patient i can ask Bon and give her a call back. Patient refused and wanted a call from Bon. Patient wanted to know if it was a hernia. Patient said she will be waiting for a call today 08/08/2017. Patient number [phone]. LM

## 2017-08-09 NOTE — TELEPHONE ENCOUNTER
Called and spoke with patient with regard to results of ultrasound referral to general surgeon at this time. states that she is continuing to have swelling in her feet and states that she has been having issues, side effects with hydrochlorothiazide follow-up appointment scheduled for Thursday at 1120.

## 2017-08-10 ENCOUNTER — HOSPITAL ENCOUNTER (OUTPATIENT)
Facility: MEDICAL CENTER | Age: 37
End: 2017-08-10
Attending: NURSE PRACTITIONER
Payer: COMMERCIAL

## 2017-08-10 ENCOUNTER — APPOINTMENT (OUTPATIENT)
Dept: MEDICAL GROUP | Facility: PHYSICIAN GROUP | Age: 37
End: 2017-08-10
Payer: COMMERCIAL

## 2017-08-10 ENCOUNTER — OFFICE VISIT (OUTPATIENT)
Dept: MEDICAL GROUP | Facility: PHYSICIAN GROUP | Age: 37
End: 2017-08-10
Payer: COMMERCIAL

## 2017-08-10 ENCOUNTER — HOSPITAL ENCOUNTER (OUTPATIENT)
Dept: LAB | Facility: MEDICAL CENTER | Age: 37
End: 2017-08-10
Attending: NURSE PRACTITIONER
Payer: COMMERCIAL

## 2017-08-10 VITALS
TEMPERATURE: 99.1 F | HEART RATE: 91 BPM | WEIGHT: 293 LBS | OXYGEN SATURATION: 95 % | HEIGHT: 65 IN | BODY MASS INDEX: 48.82 KG/M2 | SYSTOLIC BLOOD PRESSURE: 128 MMHG | DIASTOLIC BLOOD PRESSURE: 80 MMHG

## 2017-08-10 DIAGNOSIS — I10 ESSENTIAL HYPERTENSION: ICD-10-CM

## 2017-08-10 DIAGNOSIS — R60.0 BILATERAL LOWER EXTREMITY EDEMA: ICD-10-CM

## 2017-08-10 DIAGNOSIS — E66.01 MORBID OBESITY WITH BMI OF 60.0-69.9, ADULT (HCC): ICD-10-CM

## 2017-08-10 DIAGNOSIS — N30.00 ACUTE CYSTITIS WITHOUT HEMATURIA: ICD-10-CM

## 2017-08-10 DIAGNOSIS — R10.33 PERIUMBILICAL ABDOMINAL PAIN: ICD-10-CM

## 2017-08-10 DIAGNOSIS — N89.8 VAGINA ITCHING: ICD-10-CM

## 2017-08-10 DIAGNOSIS — R06.83 SNORING: ICD-10-CM

## 2017-08-10 DIAGNOSIS — N30.01 ACUTE CYSTITIS WITH HEMATURIA: ICD-10-CM

## 2017-08-10 LAB
ALBUMIN SERPL BCP-MCNC: 3.8 G/DL (ref 3.2–4.9)
ALBUMIN/GLOB SERPL: 1.2 G/DL
ALP SERPL-CCNC: 72 U/L (ref 30–99)
ALT SERPL-CCNC: 20 U/L (ref 2–50)
ANION GAP SERPL CALC-SCNC: 8 MMOL/L (ref 0–11.9)
AST SERPL-CCNC: 18 U/L (ref 12–45)
BASOPHILS # BLD AUTO: 0.8 % (ref 0–1.8)
BASOPHILS # BLD: 0.1 K/UL (ref 0–0.12)
BILIRUB SERPL-MCNC: 0.4 MG/DL (ref 0.1–1.5)
BUN SERPL-MCNC: 14 MG/DL (ref 8–22)
CALCIUM SERPL-MCNC: 8.9 MG/DL (ref 8.5–10.5)
CHLORIDE SERPL-SCNC: 104 MMOL/L (ref 96–112)
CO2 SERPL-SCNC: 25 MMOL/L (ref 20–33)
CREAT SERPL-MCNC: 0.64 MG/DL (ref 0.5–1.4)
EOSINOPHIL # BLD AUTO: 0.55 K/UL (ref 0–0.51)
EOSINOPHIL NFR BLD: 4.5 % (ref 0–6.9)
ERYTHROCYTE [DISTWIDTH] IN BLOOD BY AUTOMATED COUNT: 43.7 FL (ref 35.9–50)
GFR SERPL CREATININE-BSD FRML MDRD: >60 ML/MIN/1.73 M 2
GLOBULIN SER CALC-MCNC: 3.1 G/DL (ref 1.9–3.5)
GLUCOSE SERPL-MCNC: 97 MG/DL (ref 65–99)
HCT VFR BLD AUTO: 40.9 % (ref 37–47)
HGB BLD-MCNC: 13.3 G/DL (ref 12–16)
IMM GRANULOCYTES # BLD AUTO: 0.05 K/UL (ref 0–0.11)
IMM GRANULOCYTES NFR BLD AUTO: 0.4 % (ref 0–0.9)
LYMPHOCYTES # BLD AUTO: 3.24 K/UL (ref 1–4.8)
LYMPHOCYTES NFR BLD: 26.7 % (ref 22–41)
MCH RBC QN AUTO: 28.9 PG (ref 27–33)
MCHC RBC AUTO-ENTMCNC: 32.5 G/DL (ref 33.6–35)
MCV RBC AUTO: 88.7 FL (ref 81.4–97.8)
MONOCYTES # BLD AUTO: 0.7 K/UL (ref 0–0.85)
MONOCYTES NFR BLD AUTO: 5.8 % (ref 0–13.4)
NEUTROPHILS # BLD AUTO: 7.48 K/UL (ref 2–7.15)
NEUTROPHILS NFR BLD: 61.8 % (ref 44–72)
NRBC # BLD AUTO: 0 K/UL
NRBC BLD AUTO-RTO: 0 /100 WBC
PLATELET # BLD AUTO: 247 K/UL (ref 164–446)
PMV BLD AUTO: 9.8 FL (ref 9–12.9)
POTASSIUM SERPL-SCNC: 4 MMOL/L (ref 3.6–5.5)
PROT SERPL-MCNC: 6.9 G/DL (ref 6–8.2)
RBC # BLD AUTO: 4.61 M/UL (ref 4.2–5.4)
SODIUM SERPL-SCNC: 137 MMOL/L (ref 135–145)
WBC # BLD AUTO: 12.1 K/UL (ref 4.8–10.8)

## 2017-08-10 PROCEDURE — 87186 SC STD MICRODIL/AGAR DIL: CPT

## 2017-08-10 PROCEDURE — 87480 CANDIDA DNA DIR PROBE: CPT

## 2017-08-10 PROCEDURE — 99215 OFFICE O/P EST HI 40 MIN: CPT | Performed by: NURSE PRACTITIONER

## 2017-08-10 PROCEDURE — 87510 GARDNER VAG DNA DIR PROBE: CPT

## 2017-08-10 PROCEDURE — 87660 TRICHOMONAS VAGIN DIR PROBE: CPT

## 2017-08-10 PROCEDURE — 87077 CULTURE AEROBIC IDENTIFY: CPT

## 2017-08-10 PROCEDURE — 87491 CHLMYD TRACH DNA AMP PROBE: CPT

## 2017-08-10 PROCEDURE — 85025 COMPLETE CBC W/AUTO DIFF WBC: CPT

## 2017-08-10 PROCEDURE — 36415 COLL VENOUS BLD VENIPUNCTURE: CPT

## 2017-08-10 PROCEDURE — 87086 URINE CULTURE/COLONY COUNT: CPT

## 2017-08-10 PROCEDURE — 80053 COMPREHEN METABOLIC PANEL: CPT

## 2017-08-10 PROCEDURE — 99000 SPECIMEN HANDLING OFFICE-LAB: CPT | Performed by: NURSE PRACTITIONER

## 2017-08-10 PROCEDURE — 87591 N.GONORRHOEAE DNA AMP PROB: CPT

## 2017-08-10 RX ORDER — CIPROFLOXACIN 500 MG/1
500 TABLET, FILM COATED ORAL EVERY 12 HOURS
Qty: 14 TAB | Refills: 0 | Status: SHIPPED | OUTPATIENT
Start: 2017-08-10 | End: 2017-08-17

## 2017-08-10 RX ORDER — TRAMADOL HYDROCHLORIDE 50 MG/1
50 TABLET ORAL EVERY 4 HOURS PRN
Qty: 30 TAB | Refills: 0 | Status: SHIPPED | OUTPATIENT
Start: 2017-08-10 | End: 2017-08-29 | Stop reason: SDUPTHER

## 2017-08-10 RX ORDER — FUROSEMIDE 20 MG/1
20 TABLET ORAL DAILY
Qty: 30 TAB | Refills: 0 | Status: SHIPPED | OUTPATIENT
Start: 2017-08-10 | End: 2017-12-03

## 2017-08-10 NOTE — MR AVS SNAPSHOT
"        Sonya Flor   8/10/2017 2:20 PM   Office Visit   MRN: [id number]    Department:  [facility]   Dept Phone:  [phone]    Description:  Female : 1980   Provider:  RANJAN Freire           Reason for Visit     Edema Feet x3 months       Allergies as of 8/10/2017     No Known Allergies      You were diagnosed with     Fever, unspecified fever cause   [6925738]       Bilateral lower extremity edema   [501169]       Acute cystitis with hematuria   [[id number]]       Vagina itching   [838391]         Vital Signs     Blood Pressure Pulse Temperature Height Weight Body Mass Index    128/80 mmHg 91 37.3 °C (99.1 °F) 1.651 m (5' 5\") 168.284 kg (371 lb) 61.74 kg/m2    Oxygen Saturation Last Menstrual Period Breastfeeding? Smoking Status          95% 2017 No Former Smoker        Basic Information     Date Of Birth Sex Race Ethnicity Preferred Language    1980 Female White Non- English      Your appointments     Aug 18, 2017 11:00 AM   Established Patient with RANJAN Freire   [facility] (--)    [address]  [address]  [place] [id number] [phone]           You will be receiving a confirmation call a few days before your appointment from our automated call confirmation system.            Aug 24, 2017  2:20 PM   Established Patient with RANJAN Freire   [facility] ([facility])    [address]  [address]  [place] [id number] [phone]           You will be receiving a confirmation call a few days before your appointment from our automated call confirmation system.              Problem List              ICD-10-CM Priority Class Noted - Resolved    Moderate single current episode of major depressive disorder (CMS-HCC) F32.1   2017 - Present    Anxiety F41.9   2017 - Present    Restless legs syndrome (RLS) G25.81   2017 - Present    Morbid obesity with BMI of 60.0-69.9, adult " (CMS-HCC) E66.01, Z68.44   4/4/2017 - Present    Essential hypertension I10   7/14/2017 - Present    Snoring R06.83   7/14/2017 - Present    Umbilical hernia without obstruction and without gangrene K42.9   7/14/2017 - Present      Health Maintenance        Date Due Completion Dates    PAP SMEAR 12/14/2002 ---    IMM DTaP/Tdap/Td Vaccine (2 - Td) 4/26/2017 4/26/2007    IMM INFLUENZA (1) 9/1/2017 ---            Current Immunizations     Hep A/HEP B Combined Vaccine ([employer]) 6/14/2007, 6/14/2007, 6/14/2007, 4/26/2007, 4/26/2007, 4/26/2007    MMR Vaccine 4/28/2011    Tdap Vaccine 4/26/2007    Tuberculin Skin Test 9/12/2014, 9/5/2014      Below and/or attached are the medications your provider expects you to take. Review all of your home medications and newly ordered medications with your provider and/or pharmacist. Follow medication instructions as directed by your provider and/or pharmacist. Please keep your medication list with you and share with your provider. Update the information when medications are discontinued, doses are changed, or new medications (including over-the-counter products) are added; and carry medication information at all times in the event of emergency situations     Allergies:  No Known Allergies          Medications  Valid as of: August 10, 2017 -  3:27 PM    Generic Name Brand Name Tablet Size Instructions for use    Ciprofloxacin HCl (Tab) CIPRO 500 MG Take 1 Tab by mouth every 12 hours for 7 days.        FLUoxetine HCl (Cap) PROZAC 20 MG Take 1 Cap by mouth every day.        Furosemide (Tab) LASIX 20 MG Take 1 Tab by mouth every day.        Gabapentin (Cap) NEURONTIN 300 MG TAKE ONE CAPSULE BY MOUTH ONCE DAILY        HydroCHLOROthiazide (Tab) HYDRODIURIL 12.5 MG Take 1 Tab by mouth every day.        HydrOXYzine HCl (Tab) ATARAX 50 MG Take 1 Tab by mouth 3 times a day as needed for Anxiety.        TraMADol HCl (Tab) ULTRAM 50 MG Take 1 Tab by mouth every four hours as needed.        .                  Medicines prescribed today were sent to:     [id number] - [place], [place] - [address] [place] [address]    Phone: [phone] Fax: [phone]    Open 24 Hours?: No      Medication refill instructions:       If your prescription bottle indicates you have medication refills left, it is not necessary to call your provider’s office. Please contact your pharmacy and they will refill your medication.    If your prescription bottle indicates you do not have any refills left, you may request refills at any time through one of the following ways: The online [employer]IST[employer]AT-M[employer] system (except Urgent Care), by calling your provider’s office, or by asking your pharmacy to contact your provider’s office with a refill request. Medication refills are processed only during regular business hours and may not be available until the next business day. Your provider may request additional information or to have a follow-up visit with you prior to refilling your medication.   *Please Note: Medication refills are assigned a new Rx number when refilled electronically. Your pharmacy may indicate that no refills were authorized even though a new prescription for the same medication is available at the pharmacy. Please request the medicine by name with the pharmacy before contacting your provider for a refill.        Your To Do List     Future Labs/Procedures Complete By Expires    BTYPE NATRIURETIC PEPTIDE  As directed 8/10/2018    CBC WITH DIFFERENTIAL  As directed 8/10/2018    COMP METABOLIC PANEL  As directed 8/10/2018    VAGINAL PATHOGENS DNA PANEL  As directed 8/10/2018      Instructions      Urinary Tract Infection  A urinary tract infection (UTI) can occur any place along the urinary tract. The tract includes the kidneys, ureters, bladder, and urethra. A type of germ called bacteria often causes a UTI. UTIs are often helped with antibiotic medicine.   HOME CARE   · If given, take antibiotics  as told by your doctor. Finish them even if you start to feel better.  · Drink enough fluids to keep your pee (urine) clear or pale yellow.  · Avoid tea, drinks with caffeine, and bubbly (carbonated) drinks.  · Pee often. Avoid holding your pee in for a long time.  · Pee before and after having sex (intercourse).  · Wipe from front to back after you poop (bowel movement) if you are a woman. Use each tissue only once.  GET HELP RIGHT AWAY IF:   · You have back pain.  · You have lower belly (abdominal) pain.  · You have chills.  · You feel sick to your stomach (nauseous).  · You throw up (vomit).  · Your burning or discomfort with peeing does not go away.  · You have a fever.  · Your symptoms are not better in 3 days.  MAKE SURE YOU:   · Understand these instructions.  · Will watch your condition.  · Will get help right away if you are not doing well or get worse.     This information is not intended to replace advice given to you by your health care provider. Make sure you discuss any questions you have with your health care provider.     Document Released: 06/05/2009 Document Revised: 01/08/2016 Document Reviewed: 07/18/2013  Positive[employer] Interactive Patient Education ©2016 [employer] Inc.            "[employer]" Access Code: [email or web address]  Expires: 8/13/2017 11:28 AM    "[employer]"  A secure, online tool to manage your health information     [employer]’s "[employer]"® is a secure, online tool that connects you to your personalized health information from the privacy of your home -- day or night - making it very easy for you to manage your healthcare. Once the activation process is completed, you can even access your medical information using the "[employer]" danuta, which is available for free in the Apple Danuta store or Google Play store.     "[employer]" provides the following levels of access (as shown below):   My Chart Features   [facility] Doctor [facility]  Specialists [facility]  Urgent  Care [facility]  Primary Care  Doctor   Email  your healthcare team securely and privately 24/7 X X X    Manage appointments: schedule your next appointment; view details of past/upcoming appointments X      Request prescription refills. X      View recent personal medical records, including lab and immunizations X X X X   View health record, including health history, allergies, medications X X X X   Read reports about your outpatient visits, procedures, consult and ER notes X X X X   See your discharge summary, which is a recap of your hospital and/or ER visit that includes your diagnosis, lab results, and care plan. X X       How to register for Intoo[employer]:  1. Go to  [email or web address].  2. Click on the Sign Up Now box, which takes you to the New Member Sign Up page. You will need to provide the following information:  a. Enter your IntooBR Access Code exactly as it appears at the top of this page. (You will not need to use this code after you’ve completed the sign-up process. If you do not sign up before the expiration date, you must request a new code.)   b. Enter your date of birth.   c. Enter your home email address.   d. Click Submit, and follow the next screen’s instructions.  3. Create a IntooBR ID. This will be your IntooBR login ID and cannot be changed, so think of one that is secure and easy to remember.  4. Create a IntooBR password. You can change your password at any time.  5. Enter your Password Reset Question and Answer. This can be used at a later time if you forget your password.   6. Enter your e-mail address. This allows you to receive e-mail notifications when new information is available in IntooBR.  7. Click Sign Up. You can now view your health information.    For assistance activating your IntooBR account, call [phone]

## 2017-08-10 NOTE — PATIENT INSTRUCTIONS
Urinary Tract Infection  A urinary tract infection (UTI) can occur any place along the urinary tract. The tract includes the kidneys, ureters, bladder, and urethra. A type of germ called bacteria often causes a UTI. UTIs are often helped with antibiotic medicine.   HOME CARE   · If given, take antibiotics as told by your doctor. Finish them even if you start to feel better.  · Drink enough fluids to keep your pee (urine) clear or pale yellow.  · Avoid tea, drinks with caffeine, and bubbly (carbonated) drinks.  · Pee often. Avoid holding your pee in for a long time.  · Pee before and after having sex (intercourse).  · Wipe from front to back after you poop (bowel movement) if you are a woman. Use each tissue only once.  GET HELP RIGHT AWAY IF:   · You have back pain.  · You have lower belly (abdominal) pain.  · You have chills.  · You feel sick to your stomach (nauseous).  · You throw up (vomit).  · Your burning or discomfort with peeing does not go away.  · You have a fever.  · Your symptoms are not better in 3 days.  MAKE SURE YOU:   · Understand these instructions.  · Will watch your condition.  · Will get help right away if you are not doing well or get worse.     This information is not intended to replace advice given to you by your health care provider. Make sure you discuss any questions you have with your health care provider.     Document Released: 06/05/2009 Document Revised: 01/08/2016 Document Reviewed: 07/18/2013  [employer] Interactive Patient Education ©2016 [employer].

## 2017-08-11 ENCOUNTER — TELEPHONE (OUTPATIENT)
Dept: MEDICAL GROUP | Facility: PHYSICIAN GROUP | Age: 37
End: 2017-08-11

## 2017-08-11 LAB
C TRACH DNA SPEC QL NAA+PROBE: NEGATIVE
CANDIDA DNA VAG QL PROBE+SIG AMP: NEGATIVE
G VAGINALIS DNA VAG QL PROBE+SIG AMP: POSITIVE
N GONORRHOEA DNA SPEC QL NAA+PROBE: NEGATIVE
SPECIMEN SOURCE: NORMAL
T VAGINALIS DNA VAG QL PROBE+SIG AMP: NEGATIVE

## 2017-08-11 NOTE — PROGRESS NOTES
Chief Complaint   Patient presents with   • Edema     Feet x3 months        HISTORY OF PRESENT ILLNESS: Patient is a 36 y.o. female established patient who presents today to discuss multiple issues.    Morbid obesity with BMI of 60.0-69.9, adult (CMS-[facility])  Chronic in nature. Stable. Patient is counseled regarding healthy diet and exercise. Patient is referred to health improvement program's has not made any appointments patient is encouraged to make appointment to follow up with health improvement program's.    Essential hypertension  Blood pressure today is 128/80 and patient has not been taking hydrochlorothiazide. States that she continues to feel like she has elevated blood pressure. But states that she has not been taking her blood pressure. Patient does continue to have swelling in her hands and feet which she is concerned about. Patient states that her feet and legs and hands become so swollen and they are extremely painful. Patient rates her pain 7-8 out of 10. Patient denies chest pain, palpitations, dizziness, headache, blurry vision. Patient is having some shortness of breath but previously completed echocardiogram was within normal limits.     Periumbilical abdominal pain  Chronic in nature. Patient continues stenosis also she lump in the center of her abdomen above her umbilicus on ultrasound this area is noted to be a fluid collection that is approximately 4.3 cm patient has been referred to general surgery regarding this issue this provider will order further imaging and determine the significance, course of action best to care for this. Patient states that this is increasingly painful and she feels sharp pain with movements. Patient does have an appointment with the surgeon at the beginning of September. Is tearful during this appointment and states she is sick of feeling bad. Rates the pain 5-6 out of 10 at its worst.    Snoring  Chronic in nature. Stable. Patient has referral to pulmonology but has  "yet to schedule with this provider. Counseled patient regarding the importance of following up regarding shortness of breath with pulmonology as patient may need sleep study, may obstructive sleep apnea.     Bilateral lower extremity edema  Patient states that she started to notice this issue after her visit to the ER in March. States that this has not gotten worse or better states that hydrochlorothiazide did improve symptoms somewhat the patient states that this medication made her nauseated and as such she did not want to take it. Patient states that elevating her legs does improve her symptoms states that she did not try compression stockings for this issue patient does have some noted spider veins, but no varicosities discussed with patient possible referral to vascular medicine which she declines at this time. Patient also refuses ultrasound of her lower extremities of this time. Patient denies calf pain, difference in calf size. Patient refuses to complete chest x-ray.    Vagina itching  This is a new issue. Patient states that over the last several weeks she has noted some itching in her vagina, a foul odor. Patient states that she has not tried anything to make it better or worse. Patient denies white chunky discharge, change in vaginal discharge. Patient refuses to allow pelvic exam at this time as such patient is provided swab for vaginal pathogens will follow-up with patient and treat based on results.    Acute cystitis without hematuria  Over the last few days patient has noted some fever, chills, urgency, dysuria. He says she feels like she has been drinking \"a time\" but has not urinated as frequently as she would expect. Patient denies nausea, vomiting. Reports suprapubic pain, denies CVA tenderness. Patient states that she has been drinking increased water but has not noticed any change in her symptoms.        Patient Active Problem List    Diagnosis Date Noted   • Bilateral lower extremity edema " 08/10/2017   • Vagina itching 08/10/2017   • Acute cystitis without hematuria 08/10/2017   • Essential hypertension 07/14/2017   • Snoring 07/14/2017   • Periumbilical abdominal pain 07/14/2017   • Moderate single current episode of major depressive disorder (CMS-HCC) 04/04/2017   • Anxiety 04/04/2017   • Restless legs syndrome (RLS) 04/04/2017   • Morbid obesity with BMI of 60.0-69.9, adult (CMS-HCC) 04/04/2017       Allergies:Review of patient's allergies indicates no known allergies.    Current Outpatient Prescriptions   Medication Sig Dispense Refill   • tramadol (ULTRAM) 50 MG Tab Take 1 Tab by mouth every four hours as needed. 30 Tab 0   • furosemide (LASIX) 20 MG Tab Take 1 Tab by mouth every day. 30 Tab 0   • ciprofloxacin (CIPRO) 500 MG Tab Take 1 Tab by mouth every 12 hours for 7 days. 14 Tab 0   • gabapentin (NEURONTIN) 300 MG Cap TAKE ONE CAPSULE BY MOUTH ONCE DAILY 90 Cap 3   • fluoxetine (PROZAC) 20 MG Cap Take 1 Cap by mouth every day. 90 Cap 3   • hydrOXYzine (ATARAX) 50 MG Tab Take 1 Tab by mouth 3 times a day as needed for Anxiety. 90 Tab 0     No current facility-administered medications for this visit.       Social History   Substance Use Topics   • Smoking status: Former Smoker -- 0.00 packs/day     Quit date: 02/04/2017   • Smokeless tobacco: Never Used   • Alcohol Use: No       Family Status   Relation Status Death Age   • Mother Alive    • Father Alive      Family History   Problem Relation Age of Onset   • Alcohol/Drug Mother    • Psychiatry Mother    • Heart Disease Father 50     stents   • Diabetes Maternal Grandmother    • Heart Disease Paternal Grandfather        Review of Systems:   Constitutional: Positive for fever, chills, negative weight loss and malaise/fatigue.   HEENT:  Negative for ear pain, nosebleeds, congestion, sore throat and neck pain.    Eyes:  Negative for blurred vision.   Respiratory:  Negative for cough, sputum production, positive shortness of breath and negative  "wheezing.    Cardiovascular:  Negative for chest pain, palpitations, orthopnea and positive leg swelling.   Gastrointestinal:  Negative for heartburn, nausea, vomiting and positive abdominal pain.   Genitourinary: Positive for dysuria, urgency and frequency.   Musculoskeletal: Positive for myalgias, back pain and joint pain.   Skin:  Negative for rash and itching.   Neurological:  Negative for dizziness, tingling, tremors, sensory change, focal weakness and headaches.   Endo/Heme/Allergies:  Does not bruise/bleed easily.   Psychiatric/Behavioral: Positive for depression, negative suicidal ideas and memory loss.  The patient is not nervous/anxious and does not have insomnia.    All other systems reviewed and are negative except as in HPI.    Exam:  Blood pressure 128/80, pulse 91, temperature 37.3 °C (99.1 °F), height 1.651 m (5' 5\"), weight 168.284 kg (371 lb), last menstrual period 07/14/2017, SpO2 95 %, not currently breastfeeding.  General:  Normal appearing. No distress.  HEENT:  Normocephalic. Eyes conjunctiva clear lids without ptosis, pupils equal and reactive to light accommodation, ears normal shape and contour, canals are clear bilaterally, tympanic membranes are benign, nasal mucosa benign, oropharynx is without erythema, edema or exudates.   Neck: Supple without JVD or bruit. Thyroid is not enlarged. Exam is limited related to body habitus.  Pulmonary:  Clear to ausculation.  Normal effort. No rales, ronchi, or wheezing.  Cardiovascular:  Regular rate and rhythm without murmur. Carotid and radial pulses are intact and equal bilaterally.  Abdomen:  Soft, nondistended. Some mild periumbilical tenderness, negative guarding, negative rebound tenderness. Normal bowel sounds. Liver and spleen are not palpable. Negative CVA tenderness positive CVA tenderness exam limited by body habitus.  Neurologic:  Grossly nonfocal  Lymph:  No cervical, supraclavicular or axillary lymph nodes are palpable  Skin:  Warm and " dry.  No obvious lesions.  Musculoskeletal:  Normal gait. No extremity cyanosis, clubbing. 1-2+ edema noted in bilateral feet and hands, improved from previous assessment.   Psych:  Normal mood and affect. Alert and oriented x3. Judgment and insight is normal.  : declines    Medical decision-making and discussion: Sonya is an ill-appearing 36-year-old female patient here today to discuss continued edema, overall feeling unwell. Point-of-care urinalysis today indicates positive nitrates trace protein, pH is 7.5, negative blood, specific gravity 1.020 negative ketones negative bilirubin negative glucose. As such patient will be started on Cipro 500 mg twice daily ×7 days. Patient describes increase in abdominal pain, but assessment is stable at this time. Results of ultrasound or further discussed at this time. Patient does have temperature of 99.1states she has been having fevers over the last 24 hours. She is having vaginal itching, discharge, foul odor patient will be swabbed for chlamydia and gonorrhea, vaginal pathogens. Patient refuses to complete chest x-ray, lungs are clear to auscultation and pulse ox is 95%. CBC, CMP, and BNP are ordered to assess shortness of breath, signs of infection at this time. CBC is noted to have elevated white blood cells on completion patient is given strict precautions to follow up in the emergency room for increasing fevers, chills, confusion, nausea, vomiting, any increase in abdominal pain, shortness of breath, or chest pain. Discussed with patient that she should start feeling better in the next 48 hours on antibiotic now she is feeling worse or symptoms are not improving she should be seen in urgent care at the emergency room. With regards to pain patient will try tramadol 50 mg as needed. For swelling plan to try Lasix 20 mg to see if she has anasarca from the medication without the side effects of nausea that she was having with hydrochlorothiazide. Patient is  encouraged to make an appointment with pulmonology. Patient is also offered referral to vascular medicine with regard to likely venous insufficiency patient declines at this time.    Patient was seen for 40 minutes face to face of which, greater than 50% was spent counseling regarding the above mentioned problems.    Please note that this dictation was created using voice recognition software. I have made every reasonable attempt to correct obvious errors, but I expect that there are errors of grammar and possibly content that I did not discover before finalizing the note.    Assessment/Plan:  1. Bilateral lower extremity edema  tramadol (ULTRAM) 50 MG Tab    furosemide (LASIX) 20 MG Tab    COMP METABOLIC PANEL    CBC WITH DIFFERENTIAL    BTYPE NATRIURETIC PEPTIDE   2. Acute cystitis with hematuria [N30.01]     3. Vagina itching  VAGINAL PATHOGENS DNA PANEL    CHLAMYDIA/GC, DNA PROBE W/RFLX   4. Morbid obesity with BMI of 60.0-69.9, adult (CMS-HCC)     5. Essential hypertension     6. Periumbilical abdominal pain     7. Snoring     8. Acute cystitis without hematuria  POCT Urinalysis    URINE CULTURE    ciprofloxacin (CIPRO) 500 MG Tab          I have placed the below orders and discussed them with an approved delegating provider. The MA is performing the below orders under the direction of Dr. Ahumada.

## 2017-08-11 NOTE — ASSESSMENT & PLAN NOTE
Patient states that she started to notice this issue after her visit to the ER in March. States that this has not gotten worse or better states that hydrochlorothiazide did improve symptoms somewhat the patient states that this medication made her nauseated and as such she did not want to take it. Patient states that elevating her legs does improve her symptoms states that she did not try compression stockings for this issue patient does have some noted spider veins, but no varicosities discussed with patient possible referral to vascular medicine which she declines at this time. Patient also refuses ultrasound of her lower extremities of this time. Patient denies calf pain, difference in calf size. Patient refuses to complete chest x-ray.

## 2017-08-11 NOTE — ASSESSMENT & PLAN NOTE
Chronic in nature. Stable. Patient has referral to pulmonology but has yet to schedule with this provider. Counseled patient regarding the importance of following up regarding shortness of breath with pulmonology as patient may need sleep study, may obstructive sleep apnea.

## 2017-08-11 NOTE — ASSESSMENT & PLAN NOTE
Blood pressure today is 128/80 and patient has not been taking hydrochlorothiazide. States that she continues to feel like she has elevated blood pressure. But states that she has not been taking her blood pressure. Patient does continue to have swelling in her hands and feet which she is concerned about. Patient states that her feet and legs and hands become so swollen and they are extremely painful. Patient rates her pain 7-8 out of 10. Patient denies chest pain, palpitations, dizziness, headache, blurry vision. Patient is having some shortness of breath but previously completed echocardiogram was within normal limits.

## 2017-08-11 NOTE — ASSESSMENT & PLAN NOTE
This is a new issue. Patient states that over the last several weeks she has noted some itching in her vagina, a foul odor. Patient states that she has not tried anything to make it better or worse. Patient denies white chunky discharge, change in vaginal discharge. Patient refuses to allow pelvic exam at this time as such patient is provided swab for vaginal pathogens will follow-up with patient and treat based on results.

## 2017-08-11 NOTE — ASSESSMENT & PLAN NOTE
Chronic in nature. Stable. Patient is counseled regarding healthy diet and exercise. Patient is referred to health improvement program's has not made any appointments patient is encouraged to make appointment to follow up with health improvement program's.

## 2017-08-11 NOTE — ASSESSMENT & PLAN NOTE
"Over the last few days patient has noted some fever, chills, urgency, dysuria. He says she feels like she has been drinking \"a time\" but has not urinated as frequently as she would expect. Patient denies nausea, vomiting. Reports suprapubic pain, denies CVA tenderness. Patient states that she has been drinking increased water but has not noticed any change in her symptoms.  "

## 2017-08-11 NOTE — ASSESSMENT & PLAN NOTE
Chronic in nature. Patient continues stenosis also she lump in the center of her abdomen above her umbilicus on ultrasound this area is noted to be a fluid collection that is approximately 4.3 cm patient has been referred to general surgery regarding this issue this provider will order further imaging and determine the significance, course of action best to care for this. Patient states that this is increasingly painful and she feels sharp pain with movements. Patient does have an appointment with the surgeon at the beginning of September. Is tearful during this appointment and states she is sick of feeling bad. Rates the pain 5-6 out of 10 at its worst.

## 2017-08-11 NOTE — TELEPHONE ENCOUNTER
----- Message from RANJAN Freire sent at 8/10/2017  7:08 PM PDT -----  Please call pt and give lab results: Electrolytes, liver, kidney function, fasting blood sugar are all within normal limits. White blood cells are elevated at 12.1 which does indicate an infection, this could represent urinary tract infection which we are treating with cipro. If symptoms are not improving after 48 hours on antibiotics, or symptoms are getting worse please follow up in urgent care or the emergency room. Please follow up in urgent care or the emergency room if abdominal pain becomes more severe.

## 2017-08-11 NOTE — TELEPHONE ENCOUNTER
VOICEMAIL  1. Caller Name: Sonya Flor                        Call Back Number: [phone] (home)       2. Message: Called and left patient a VM to call back to get lab results. LM     3. Patient approves office to leave a detailed voicemail/MyChart message: N\A

## 2017-08-13 LAB
BACTERIA UR CULT: ABNORMAL
BACTERIA UR CULT: ABNORMAL
SIGNIFICANT IND 70042: ABNORMAL
SOURCE SOURCE: ABNORMAL

## 2017-08-14 ENCOUNTER — TELEPHONE (OUTPATIENT)
Dept: MEDICAL GROUP | Facility: PHYSICIAN GROUP | Age: 37
End: 2017-08-14

## 2017-08-14 DIAGNOSIS — N76.0 BV (BACTERIAL VAGINOSIS): ICD-10-CM

## 2017-08-14 DIAGNOSIS — R60.0 BILATERAL LOWER EXTREMITY EDEMA: ICD-10-CM

## 2017-08-14 DIAGNOSIS — B96.89 BV (BACTERIAL VAGINOSIS): ICD-10-CM

## 2017-08-14 RX ORDER — METRONIDAZOLE 7.5 MG/G
1 GEL VAGINAL
Qty: 25 G | Refills: 0 | Status: SHIPPED | OUTPATIENT
Start: 2017-08-14 | End: 2017-08-29

## 2017-08-14 NOTE — TELEPHONE ENCOUNTER
----- Message from RANJAN Freire sent at 8/14/2017  7:10 AM PDT -----  Please call pt and give lab results: Urine culture was positive for Escherichia coli you are on the appropriate antibiotic for this issue. You're also positive for Gardnerella infection, this is bacterial vaginosis I'm going to send a prescription for metronidazole gel to use nightly.

## 2017-08-14 NOTE — TELEPHONE ENCOUNTER
Called and spoke with patient regarding lab results. Patient stated she is not feeling any better. Patient also said she has oral thrush now. Bon advised patient to be seen in the ER.Patient said she will give it a couple more days to see if she feels any better.

## 2017-08-14 NOTE — TELEPHONE ENCOUNTER
VOICEMAIL  1. Caller Name: Sonya Flor                        Call Back Number: [phone] (home)       2. Message: Called and left patient a message to call back to get lab results. LM     3. Patient approves office to leave a detailed voicemail/MyChart message: N\A

## 2017-08-17 ENCOUNTER — TELEPHONE (OUTPATIENT)
Dept: MEDICAL GROUP | Facility: PHYSICIAN GROUP | Age: 37
End: 2017-08-17

## 2017-08-17 DIAGNOSIS — B37.0 ORAL THRUSH: ICD-10-CM

## 2017-08-17 NOTE — TELEPHONE ENCOUNTER
Patient said that medication for thrush in mouth was not at pharmacy. Patient is in a lot of pain. Patient would like something sent to pharmacy. Patient was very upset. KRISTEN

## 2017-08-18 ENCOUNTER — TELEPHONE (OUTPATIENT)
Dept: MEDICAL GROUP | Facility: PHYSICIAN GROUP | Age: 37
End: 2017-08-18

## 2017-08-19 NOTE — TELEPHONE ENCOUNTER
Patient called and left a vm. Patient requested medication for oral thrush. Bon sent medication 08/17/2017 to [facility]. Patient said medication was not there and i did not call her back she is in a lot of pain and what is it going to take for her to something for the pain. Called patient 08/17/2017 but couldn't leave a VM because her VM is full. Called pharmacy today to verify medication is there and ready to be picked. I got confirmation that it was. Tried to call patient again to inform her medication is ready but no answer and no VM able to left. I will try and email patient to try and get a hold of her. LM

## 2017-08-22 ENCOUNTER — TELEPHONE (OUTPATIENT)
Dept: MEDICAL GROUP | Facility: PHYSICIAN GROUP | Age: 37
End: 2017-08-22

## 2017-08-22 DIAGNOSIS — B37.31 YEAST VAGINITIS: ICD-10-CM

## 2017-08-22 RX ORDER — FLUCONAZOLE 150 MG/1
150 TABLET ORAL ONCE
Qty: 3 TAB | Refills: 0 | Status: SHIPPED | OUTPATIENT
Start: 2017-08-22 | End: 2017-08-22

## 2017-08-22 NOTE — TELEPHONE ENCOUNTER
Patient called and left a . Patient said she got the oral mouth wash and thrush is gone. Patient also said her fever is gone. Patient did the vaginal cream and that is better as well. Patient was requesting medication for yeast infection. Patient said other than that everything looks good. She will call the pharmacy later to see in medication is at the pharmacy. [phone] (home) [phone]. LM

## 2017-08-22 NOTE — TELEPHONE ENCOUNTER
Called and spoke with patient regarding medication. Patient said her abdominal pain is still there. Patient has an appointment to see GI in September. Patient canceled appointment for Thursday. KRISTEN

## 2017-08-29 ENCOUNTER — TELEPHONE (OUTPATIENT)
Dept: MEDICAL GROUP | Facility: PHYSICIAN GROUP | Age: 37
End: 2017-08-29

## 2017-08-29 ENCOUNTER — OFFICE VISIT (OUTPATIENT)
Dept: MEDICAL GROUP | Facility: PHYSICIAN GROUP | Age: 37
End: 2017-08-29
Payer: COMMERCIAL

## 2017-08-29 ENCOUNTER — HOSPITAL ENCOUNTER (OUTPATIENT)
Facility: MEDICAL CENTER | Age: 37
End: 2017-08-29
Attending: NURSE PRACTITIONER
Payer: COMMERCIAL

## 2017-08-29 ENCOUNTER — HOSPITAL ENCOUNTER (OUTPATIENT)
Dept: LAB | Facility: MEDICAL CENTER | Age: 37
End: 2017-08-29
Attending: NURSE PRACTITIONER
Payer: COMMERCIAL

## 2017-08-29 VITALS
RESPIRATION RATE: 16 BRPM | HEIGHT: 65 IN | OXYGEN SATURATION: 95 % | WEIGHT: 268 LBS | TEMPERATURE: 97.8 F | BODY MASS INDEX: 44.65 KG/M2 | SYSTOLIC BLOOD PRESSURE: 130 MMHG | HEART RATE: 75 BPM | DIASTOLIC BLOOD PRESSURE: 78 MMHG

## 2017-08-29 DIAGNOSIS — E66.01 MORBID OBESITY WITH BMI OF 60.0-69.9, ADULT (HCC): ICD-10-CM

## 2017-08-29 DIAGNOSIS — Z12.4 SCREENING FOR CERVICAL CANCER: ICD-10-CM

## 2017-08-29 DIAGNOSIS — G25.81 RESTLESS LEGS SYNDROME (RLS): ICD-10-CM

## 2017-08-29 DIAGNOSIS — N30.00 ACUTE CYSTITIS WITHOUT HEMATURIA: ICD-10-CM

## 2017-08-29 DIAGNOSIS — N92.6 MISSED PERIOD: ICD-10-CM

## 2017-08-29 DIAGNOSIS — F32.1 MODERATE SINGLE CURRENT EPISODE OF MAJOR DEPRESSIVE DISORDER (HCC): ICD-10-CM

## 2017-08-29 DIAGNOSIS — R10.33 PERIUMBILICAL ABDOMINAL PAIN: ICD-10-CM

## 2017-08-29 DIAGNOSIS — I10 ESSENTIAL HYPERTENSION: ICD-10-CM

## 2017-08-29 DIAGNOSIS — F41.9 ANXIETY: ICD-10-CM

## 2017-08-29 DIAGNOSIS — R60.0 BILATERAL LOWER EXTREMITY EDEMA: ICD-10-CM

## 2017-08-29 LAB
APPEARANCE UR: CLEAR
BILIRUB UR STRIP-MCNC: NORMAL MG/DL
CANDIDA DNA VAG QL PROBE+SIG AMP: NEGATIVE
COLOR UR AUTO: NORMAL
CORTIS SERPL-MCNC: 8.3 UG/DL (ref 0–23)
G VAGINALIS DNA VAG QL PROBE+SIG AMP: POSITIVE
GLUCOSE UR STRIP.AUTO-MCNC: NORMAL MG/DL
INT CON NEG: NEGATIVE
INT CON POS: POSITIVE
KETONES UR STRIP.AUTO-MCNC: NORMAL MG/DL
LEUKOCYTE ESTERASE UR QL STRIP.AUTO: NORMAL
NITRITE UR QL STRIP.AUTO: NORMAL
PH UR STRIP.AUTO: 6 [PH] (ref 5–8)
POC URINE PREGNANCY TEST: NORMAL
PROT UR QL STRIP: 30 MG/DL
RBC UR QL AUTO: NORMAL
SP GR UR STRIP.AUTO: 1.03
T VAGINALIS DNA VAG QL PROBE+SIG AMP: NEGATIVE
UROBILINOGEN UR STRIP-MCNC: NORMAL MG/DL

## 2017-08-29 PROCEDURE — 81025 URINE PREGNANCY TEST: CPT | Performed by: NURSE PRACTITIONER

## 2017-08-29 PROCEDURE — 36415 COLL VENOUS BLD VENIPUNCTURE: CPT

## 2017-08-29 PROCEDURE — 99000 SPECIMEN HANDLING OFFICE-LAB: CPT | Performed by: NURSE PRACTITIONER

## 2017-08-29 PROCEDURE — 99214 OFFICE O/P EST MOD 30 MIN: CPT | Performed by: NURSE PRACTITIONER

## 2017-08-29 PROCEDURE — 87624 HPV HI-RISK TYP POOLED RSLT: CPT

## 2017-08-29 PROCEDURE — 87510 GARDNER VAG DNA DIR PROBE: CPT

## 2017-08-29 PROCEDURE — 81002 URINALYSIS NONAUTO W/O SCOPE: CPT | Performed by: NURSE PRACTITIONER

## 2017-08-29 PROCEDURE — 88175 CYTOPATH C/V AUTO FLUID REDO: CPT

## 2017-08-29 PROCEDURE — 87591 N.GONORRHOEAE DNA AMP PROB: CPT

## 2017-08-29 PROCEDURE — 82533 TOTAL CORTISOL: CPT

## 2017-08-29 PROCEDURE — 87660 TRICHOMONAS VAGIN DIR PROBE: CPT

## 2017-08-29 PROCEDURE — 87491 CHLMYD TRACH DNA AMP PROBE: CPT

## 2017-08-29 PROCEDURE — 87480 CANDIDA DNA DIR PROBE: CPT

## 2017-08-29 PROCEDURE — 87086 URINE CULTURE/COLONY COUNT: CPT

## 2017-08-29 RX ORDER — SULFAMETHOXAZOLE AND TRIMETHOPRIM 800; 160 MG/1; MG/1
1 TABLET ORAL EVERY 12 HOURS
Qty: 10 TAB | Refills: 0 | Status: SHIPPED | OUTPATIENT
Start: 2017-08-29 | End: 2017-09-03

## 2017-08-29 RX ORDER — TRAMADOL HYDROCHLORIDE 50 MG/1
50 TABLET ORAL EVERY 4 HOURS PRN
Qty: 90 TAB | Refills: 0 | Status: SHIPPED | OUTPATIENT
Start: 2017-08-29 | End: 2017-09-20 | Stop reason: SDUPTHER

## 2017-08-29 ASSESSMENT — PAIN SCALES - GENERAL: PAINLEVEL: 3=SLIGHT PAIN

## 2017-08-29 NOTE — ASSESSMENT & PLAN NOTE
Chronic in nature. Stable. Patient has appointment with general surgery on September 1. Patient continues to have pain which she rates as 6 out of 10 at the worst. Patient is not having nausea, vomiting, states she is still having fevers on and off, possibly related to UTI. Patient does have tenderness to palpation.

## 2017-08-29 NOTE — ASSESSMENT & PLAN NOTE
Symptoms include decreased energy, weight gain, some insomnia. Symptoms are improved. Patient continues to take Prozac 20 mg daily. Denies suicidal homicidal ideation. Patient denies side effects from medication.

## 2017-08-29 NOTE — TELEPHONE ENCOUNTER
----- Message from RANJAN Freire sent at 8/29/2017  4:12 PM PDT -----  Please call pt and give lab results: Cortisol was within normal limits. I'll let you know as soon as I get the urine results.

## 2017-08-29 NOTE — ASSESSMENT & PLAN NOTE
Chronic in nature. Stable. Improved on 20 mg Prilosec daily. She has used Atarax on and off which works well.

## 2017-08-29 NOTE — ASSESSMENT & PLAN NOTE
This is a new problem. Patient continues to notice fever, urgency, severe dysuria. States she has been drinking lots of water. Patient states that symptoms do not steadily improve after antibiotics but that they have returned. Patient denies suprapubic pain, denies suprapubic tenderness.

## 2017-08-29 NOTE — ASSESSMENT & PLAN NOTE
Chronic in nature. Blood pressure is 130/78 today. Patient is taking Lasix as needed for swelling in her feet which has significantly improved. Patient has not been taking blood pressure home. Patient denies chest pain, palpitations, dizziness, headache, blurry vision.

## 2017-08-29 NOTE — ASSESSMENT & PLAN NOTE
Chronic in nature. Stable. Patient states that 300 mg gabapentin nightly is working well to control symptoms. Patient is not having any issues sleeping since starting the medication.

## 2017-08-29 NOTE — PROGRESS NOTES
Chief Complaint   Patient presents with   • Yeast Infection       HISTORY OF PRESENT ILLNESS: Patient is a 36 y.o. female established patient who presents today to discuss multiple issues.    Moderate single current episode of major depressive disorder (CMS-[facility])  Symptoms include decreased energy, weight gain, some insomnia. Symptoms are improved. Patient continues to take Prozac 20 mg daily. Denies suicidal homicidal ideation. Patient denies side effects from medication.    Anxiety  Chronic in nature. Stable. Improved on 20 mg Prilosec daily. She has used Atarax on and off which works well.    Restless legs syndrome (RLS)  Chronic in nature. Stable. Patient states that 300 mg gabapentin nightly is working well to control symptoms. Patient is not having any issues sleeping since starting the medication.    Essential hypertension  Chronic in nature. Blood pressure is 130/78 today. Patient is taking Lasix as needed for swelling in her feet which has significantly improved. Patient has not been taking blood pressure home. Patient denies chest pain, palpitations, dizziness, headache, blurry vision.    Bilateral lower extremity edema  Results at this time.    Periumbilical abdominal pain  Chronic in nature. Stable. Patient has appointment with general surgery on September 1. Patient continues to have pain which she rates as 6 out of 10 at the worst. Patient is not having nausea, vomiting, states she is still having fevers on and off, possibly related to UTI. Patient does have tenderness to palpation.    Acute cystitis without hematuria  This is a new problem. Patient continues to notice fever, urgency, severe dysuria. States she has been drinking lots of water. Patient states that symptoms do not steadily improve after antibiotics but that they have returned. Patient denies suprapubic pain, denies suprapubic tenderness.      Patient Active Problem List    Diagnosis Date Noted   • Bilateral lower extremity edema  08/10/2017   • Vagina itching 08/10/2017   • Acute cystitis without hematuria 08/10/2017   • Essential hypertension 07/14/2017   • Snoring 07/14/2017   • Periumbilical abdominal pain 07/14/2017   • Moderate single current episode of major depressive disorder (CMS-HCC) 04/04/2017   • Anxiety 04/04/2017   • Restless legs syndrome (RLS) 04/04/2017   • Morbid obesity with BMI of 60.0-69.9, adult (CMS-HCC) 04/04/2017       Allergies:Review of patient's allergies indicates no known allergies.    Current Outpatient Prescriptions   Medication Sig Dispense Refill   • sulfamethoxazole-trimethoprim (BACTRIM DS) 800-160 MG tablet Take 1 Tab by mouth every 12 hours for 5 days. 10 Tab 0   • tramadol (ULTRAM) 50 MG Tab Take 1 Tab by mouth every four hours as needed. 90 Tab 0   • furosemide (LASIX) 20 MG Tab Take 1 Tab by mouth every day. 30 Tab 0   • gabapentin (NEURONTIN) 300 MG Cap TAKE ONE CAPSULE BY MOUTH ONCE DAILY 90 Cap 3   • fluoxetine (PROZAC) 20 MG Cap Take 1 Cap by mouth every day. 90 Cap 3   • hydrOXYzine (ATARAX) 50 MG Tab Take 1 Tab by mouth 3 times a day as needed for Anxiety. 90 Tab 0     No current facility-administered medications for this visit.        Social History   Substance Use Topics   • Smoking status: Former Smoker     Packs/day: 0.00     Quit date: 2/4/2017   • Smokeless tobacco: Never Used   • Alcohol use No       Family Status   Relation Status   • Mother Alive   • Father Alive   • Maternal Grandmother    • Paternal Grandfather      Family History   Problem Relation Age of Onset   • Alcohol/Drug Mother    • Psychiatry Mother    • Heart Disease Father 50     stents   • Diabetes Maternal Grandmother    • Heart Disease Paternal Grandfather        Review of Systems:   Constitutional:  positive for fever, chills, negative weight loss and malaise/fatigue.   HEENT:  Negative for ear pain, nosebleeds, congestion, sore throat and neck pain.    Eyes:  Negative for blurred vision.   Respiratory:  Negative for  "cough, sputum production, shortness of breath and wheezing.    Cardiovascular:  Negative for chest pain, palpitations, orthopnea and leg swelling.   Gastrointestinal:  Negative for heartburn, nausea, vomiting and positive abdominal pain.   Genitourinary:  Positive for dysuria, urgency and frequency.   Musculoskeletal:  Negative for myalgias, back pain and joint pain.   Skin:  Negative for rash and itching.   Neurological:  Negative for dizziness, tingling, tremors, sensory change, focal weakness and headaches.   Endo/Heme/Allergies:  Does not bruise/bleed easily.   Psychiatric/Behavioral:  Negative for depression, suicidal ideas and memory loss.  The patient is not nervous/anxious and does not have insomnia.    All other systems reviewed and are negative except as in HPI.    Exam:  Blood pressure 130/78, pulse 75, temperature 36.6 °C (97.8 °F), resp. rate 16, height 1.651 m (5' 5\"), weight 121.6 kg (268 lb), last menstrual period 07/14/2017, SpO2 95 %, not currently breastfeeding.  General:  Normal appearing. No distress.  HEENT:  Normocephalic. Eyes conjunctiva clear lids without ptosis, pupils equal and reactive to light accommodation, ears normal shape and contour, canals are clear bilaterally, tympanic membranes are benign, nasal mucosa benign, oropharynx is without erythema, edema or exudates.   Neck:  Supple without JVD or bruit. Thyroid is not enlarged.  Pulmonary:  Clear to ausculation.  Normal effort. No rales, ronchi, or wheezing.  Cardiovascular:  Regular rate and rhythm without murmur. Carotid and radial pulses are intact and equal bilaterally.  Abdomen:  Soft, tenderness above umbilicus, nondistended. Normal bowel sounds. Liver and spleen are not palpable.  Neurologic:  Grossly nonfocal  Lymph:  No cervical, supraclavicular or axillary lymph nodes are palpable  Skin:  Warm and dry.  No obvious lesions.  Musculoskeletal:  Normal gait. No extremity cyanosis, clubbing, or NO edema.  Psych:  Normal mood and " affect. Alert and oriented x3. Judgment and insight is normal.  :Perineum and external genitalia normal without rash. Vagina with white and thin discharge. Cervix without visible lesions or discharge. Bimanual exam without adnexal masses or cervical motion tenderness. Exam hindered by body habitus.    PLAN:    1. Bilateral lower extremity edema  Resolved continue current plan of care.    2. Acute cystitis without hematuria  Vaginal pathogens to assess resolution of the feet, for yeast infection, Bactrim for severe dysuria  Plan of care urinalysis with dark urine, positive blood positive protein and positive bilirubin, but negative leukocytes negative nitrites culture collected at this time.  - sulfamethoxazole-trimethoprim (BACTRIM DS) 800-160 MG tablet; Take 1 Tab by mouth every 12 hours for 5 days.  Dispense: 10 Tab; Refill: 0  - POCT Urinalysis  - URINE CULTURE  - VAGINAL PATHOGENS DNA PANEL; Future    3. Morbid obesity with BMI of 60.0-69.9, adult (CMS-HCC)  With regard to the patient's weight gain after having gastric bypass surgery plan to order cortisol and consider referral to endocrinology this was discussed with patient.  - CORTISOL; Future    4. Screening for cervical cancer  Patient's last Pap was approximately 14 years ago, completed at this time.  - THINPREP PAP W/HPV AND CTNG; Future    5. Missed period  Negative point-of-care pregnancy  - POCT Pregnancy    6. Moderate single current episode of major depressive disorder (CMS-HCC)  Continue Prozac.    7. Anxiety  Continue prior to that, Atarax    8. Restless legs syndrome (RLS)  Continue gabapentin    9. Essential hypertension  Continue Lasix as needed for swelling    10. Periumbilical abdominal pain  Surgical consult September 1  - tramadol (ULTRAM) 50 MG Tab; Take 1 Tab by mouth every four hours as needed.  Dispense: 90 Tab; Refill: 0    Follow-up in one month or as needed. Patient is encouraged to be seen in the emergency room for chest pain,  palpitations, shortness of breath, dizziness, severe abdominal pain or other concerning symptoms.    Please note that this dictation was created using voice recognition software. I have made every reasonable attempt to correct obvious errors, but I expect that there are errors of grammar and possibly content that I did not discover before finalizing the note.    Assessment/Plan:  1. Bilateral lower extremity edema     2. Acute cystitis without hematuria  sulfamethoxazole-trimethoprim (BACTRIM DS) 800-160 MG tablet    POCT Urinalysis    URINE CULTURE    VAGINAL PATHOGENS DNA PANEL    CANCELED: URINE CULTURE   3. Morbid obesity with BMI of 60.0-69.9, adult (CMS-[facility])  CORTISOL   4. Screening for cervical cancer  THINPREP PAP W/HPV AND CTNG   5. Missed period  POCT Pregnancy   6. Moderate single current episode of major depressive disorder (CMS-HCC)     7. Anxiety     8. Restless legs syndrome (RLS)     9. Essential hypertension     10. Periumbilical abdominal pain  tramadol (ULTRAM) 50 MG Tab          I have placed the below orders and discussed them with an approved delegating provider. The MA is performing the below orders under the direction of Dr. Ahumada.

## 2017-08-30 LAB
C TRACH DNA GENITAL QL NAA+PROBE: NEGATIVE
CYTOLOGY REG CYTOL: NORMAL
HPV HR 12 DNA CVX QL NAA+PROBE: NEGATIVE
HPV16 DNA SPEC QL NAA+PROBE: NEGATIVE
HPV18 DNA SPEC QL NAA+PROBE: NEGATIVE
N GONORRHOEA DNA GENITAL QL NAA+PROBE: NEGATIVE
SPECIMEN SOURCE: NORMAL
SPECIMEN SOURCE: NORMAL

## 2017-08-31 ENCOUNTER — TELEPHONE (OUTPATIENT)
Dept: MEDICAL GROUP | Facility: PHYSICIAN GROUP | Age: 37
End: 2017-08-31

## 2017-08-31 DIAGNOSIS — B96.89 BV (BACTERIAL VAGINOSIS): ICD-10-CM

## 2017-08-31 DIAGNOSIS — N76.0 BV (BACTERIAL VAGINOSIS): ICD-10-CM

## 2017-08-31 LAB
BACTERIA UR CULT: NORMAL
SIGNIFICANT IND 70042: NORMAL
SOURCE SOURCE: NORMAL

## 2017-08-31 RX ORDER — METRONIDAZOLE 500 MG/1
500 TABLET ORAL 2 TIMES DAILY
Qty: 20 TAB | Refills: 0 | Status: SHIPPED | OUTPATIENT
Start: 2017-08-31 | End: 2017-09-10

## 2017-08-31 NOTE — TELEPHONE ENCOUNTER
----- Message from RANJAN Freire sent at 8/31/2017 10:34 AM PDT -----  Please call pt and give lab results: Pap smear was within normal limits, HPV was negative. Repeat Pap smear in 5 years. Chlamydia and gonorrhea were negative. You're still positive for Gardnerella I will send oral medication for this issue.

## 2017-08-31 NOTE — TELEPHONE ENCOUNTER
----- Message from RANJAN Freire sent at 8/31/2017 10:28 AM PDT -----  Please call pt and give lab results: Urine culture was negative for infection.

## 2017-08-31 NOTE — TELEPHONE ENCOUNTER
VOICEMAIL  1. Caller Name: Sonya Flor                        Call Back Number: [phone] (home)       2. Message: Called and left patient a message to call back and get lab results.     3. Patient approves office to leave a detailed voicemail/MyChart message: N\A

## 2017-08-31 NOTE — TELEPHONE ENCOUNTER
Called and spoke with patient regarding lab results. Patient said she picked up medication yesterday. Patient already started to take medication.

## 2017-09-22 ENCOUNTER — HOSPITAL ENCOUNTER (OUTPATIENT)
Dept: RADIOLOGY | Facility: MEDICAL CENTER | Age: 37
End: 2017-09-22
Attending: CLINICAL NURSE SPECIALIST
Payer: COMMERCIAL

## 2017-09-22 DIAGNOSIS — R10.12 ABDOMINAL PAIN, LUQ: ICD-10-CM

## 2017-09-22 PROCEDURE — 74241 DX-UPPER GI-SERIES WITH KUB: CPT

## 2017-12-03 ENCOUNTER — HOSPITAL ENCOUNTER (EMERGENCY)
Facility: MEDICAL CENTER | Age: 37
End: 2017-12-03
Attending: EMERGENCY MEDICINE
Payer: COMMERCIAL

## 2017-12-03 ENCOUNTER — APPOINTMENT (OUTPATIENT)
Dept: RADIOLOGY | Facility: MEDICAL CENTER | Age: 37
End: 2017-12-03
Attending: EMERGENCY MEDICINE
Payer: COMMERCIAL

## 2017-12-03 VITALS
TEMPERATURE: 98.7 F | RESPIRATION RATE: 20 BRPM | WEIGHT: 293 LBS | BODY MASS INDEX: 48.82 KG/M2 | DIASTOLIC BLOOD PRESSURE: 82 MMHG | HEART RATE: 77 BPM | OXYGEN SATURATION: 96 % | HEIGHT: 65 IN | SYSTOLIC BLOOD PRESSURE: 168 MMHG

## 2017-12-03 DIAGNOSIS — N20.0 RENAL CALCULI: ICD-10-CM

## 2017-12-03 DIAGNOSIS — N83.299 COMPLEX OVARIAN CYST: ICD-10-CM

## 2017-12-03 LAB
ALBUMIN SERPL BCP-MCNC: 4 G/DL (ref 3.2–4.9)
ALBUMIN/GLOB SERPL: 1.3 G/DL
ALP SERPL-CCNC: 57 U/L (ref 30–99)
ALT SERPL-CCNC: 21 U/L (ref 2–50)
ANION GAP SERPL CALC-SCNC: 11 MMOL/L (ref 0–11.9)
APPEARANCE UR: ABNORMAL
AST SERPL-CCNC: 22 U/L (ref 12–45)
BASOPHILS # BLD AUTO: 0.8 % (ref 0–1.8)
BASOPHILS # BLD: 0.1 K/UL (ref 0–0.12)
BILIRUB SERPL-MCNC: 0.4 MG/DL (ref 0.1–1.5)
BUN SERPL-MCNC: 18 MG/DL (ref 8–22)
CALCIUM SERPL-MCNC: 8.9 MG/DL (ref 8.4–10.2)
CHLORIDE SERPL-SCNC: 103 MMOL/L (ref 96–112)
CO2 SERPL-SCNC: 24 MMOL/L (ref 20–33)
COLOR UR: ABNORMAL
CREAT SERPL-MCNC: 0.88 MG/DL (ref 0.5–1.4)
EOSINOPHIL # BLD AUTO: 0.22 K/UL (ref 0–0.51)
EOSINOPHIL NFR BLD: 1.7 % (ref 0–6.9)
ERYTHROCYTE [DISTWIDTH] IN BLOOD BY AUTOMATED COUNT: 40.3 FL (ref 35.9–50)
GFR SERPL CREATININE-BSD FRML MDRD: >60 ML/MIN/1.73 M 2
GLOBULIN SER CALC-MCNC: 3.2 G/DL (ref 1.9–3.5)
GLUCOSE SERPL-MCNC: 140 MG/DL (ref 65–99)
GLUCOSE UR STRIP.AUTO-MCNC: NEGATIVE MG/DL
HCG SERPL QL: NEGATIVE
HCG UR QL: NEGATIVE
HCT VFR BLD AUTO: 41.2 % (ref 37–47)
HGB BLD-MCNC: 13.8 G/DL (ref 12–16)
IMM GRANULOCYTES # BLD AUTO: 0.08 K/UL (ref 0–0.11)
IMM GRANULOCYTES NFR BLD AUTO: 0.6 % (ref 0–0.9)
KETONES UR STRIP.AUTO-MCNC: 15 MG/DL
LEUKOCYTE ESTERASE UR QL STRIP.AUTO: NEGATIVE
LIPASE SERPL-CCNC: 23 U/L (ref 7–58)
LYMPHOCYTES # BLD AUTO: 2.69 K/UL (ref 1–4.8)
LYMPHOCYTES NFR BLD: 20.7 % (ref 22–41)
MCH RBC QN AUTO: 29.6 PG (ref 27–33)
MCHC RBC AUTO-ENTMCNC: 33.5 G/DL (ref 33.6–35)
MCV RBC AUTO: 88.4 FL (ref 81.4–97.8)
MONOCYTES # BLD AUTO: 0.64 K/UL (ref 0–0.85)
MONOCYTES NFR BLD AUTO: 4.9 % (ref 0–13.4)
NEUTROPHILS # BLD AUTO: 9.24 K/UL (ref 2–7.15)
NEUTROPHILS NFR BLD: 71.3 % (ref 44–72)
NITRITE UR QL STRIP.AUTO: NEGATIVE
NRBC # BLD AUTO: 0 K/UL
NRBC BLD AUTO-RTO: 0 /100 WBC
PH UR STRIP.AUTO: 5.5 [PH]
PLATELET # BLD AUTO: 257 K/UL (ref 164–446)
PMV BLD AUTO: 9.7 FL (ref 9–12.9)
POTASSIUM SERPL-SCNC: 3.6 MMOL/L (ref 3.6–5.5)
PROT SERPL-MCNC: 7.2 G/DL (ref 6–8.2)
PROT UR QL STRIP: 30 MG/DL
RBC # BLD AUTO: 4.66 M/UL (ref 4.2–5.4)
RBC UR QL AUTO: NEGATIVE
SODIUM SERPL-SCNC: 138 MMOL/L (ref 135–145)
SP GR UR STRIP.AUTO: >=1.03
WBC # BLD AUTO: 13 K/UL (ref 4.8–10.8)

## 2017-12-03 PROCEDURE — 96374 THER/PROPH/DIAG INJ IV PUSH: CPT

## 2017-12-03 PROCEDURE — 74176 CT ABD & PELVIS W/O CONTRAST: CPT

## 2017-12-03 PROCEDURE — 83690 ASSAY OF LIPASE: CPT

## 2017-12-03 PROCEDURE — 85025 COMPLETE CBC W/AUTO DIFF WBC: CPT

## 2017-12-03 PROCEDURE — 96375 TX/PRO/DX INJ NEW DRUG ADDON: CPT

## 2017-12-03 PROCEDURE — 81002 URINALYSIS NONAUTO W/O SCOPE: CPT

## 2017-12-03 PROCEDURE — 84703 CHORIONIC GONADOTROPIN ASSAY: CPT

## 2017-12-03 PROCEDURE — 80053 COMPREHEN METABOLIC PANEL: CPT

## 2017-12-03 PROCEDURE — 99284 EMERGENCY DEPT VISIT MOD MDM: CPT

## 2017-12-03 PROCEDURE — 700111 HCHG RX REV CODE 636 W/ 250 OVERRIDE (IP): Performed by: EMERGENCY MEDICINE

## 2017-12-03 PROCEDURE — 81025 URINE PREGNANCY TEST: CPT

## 2017-12-03 RX ORDER — IBUPROFEN 200 MG
800 TABLET ORAL EVERY 6 HOURS PRN
Status: SHIPPED | COMMUNITY
End: 2019-07-23

## 2017-12-03 RX ORDER — GABAPENTIN 300 MG/1
300 CAPSULE ORAL
Status: SHIPPED | COMMUNITY
End: 2019-07-16

## 2017-12-03 RX ORDER — KETOROLAC TROMETHAMINE 30 MG/ML
30 INJECTION, SOLUTION INTRAMUSCULAR; INTRAVENOUS ONCE
Status: COMPLETED | OUTPATIENT
Start: 2017-12-03 | End: 2017-12-03

## 2017-12-03 RX ORDER — ACETAMINOPHEN 500 MG
1000 TABLET ORAL EVERY 6 HOURS PRN
Status: SHIPPED | COMMUNITY
End: 2019-07-23

## 2017-12-03 RX ORDER — ONDANSETRON 4 MG/1
4 TABLET, ORALLY DISINTEGRATING ORAL EVERY 8 HOURS PRN
Qty: 20 TAB | Refills: 0 | Status: SHIPPED | OUTPATIENT
Start: 2017-12-03 | End: 2017-12-07 | Stop reason: SDUPTHER

## 2017-12-03 RX ORDER — MORPHINE SULFATE 4 MG/ML
4 INJECTION, SOLUTION INTRAMUSCULAR; INTRAVENOUS ONCE
Status: COMPLETED | OUTPATIENT
Start: 2017-12-03 | End: 2017-12-03

## 2017-12-03 RX ORDER — ONDANSETRON 2 MG/ML
4 INJECTION INTRAMUSCULAR; INTRAVENOUS ONCE
Status: COMPLETED | OUTPATIENT
Start: 2017-12-03 | End: 2017-12-03

## 2017-12-03 RX ORDER — OXYCODONE AND ACETAMINOPHEN 10; 325 MG/1; MG/1
1-2 TABLET ORAL EVERY 6 HOURS PRN
Qty: 20 TAB | Refills: 0 | Status: SHIPPED | OUTPATIENT
Start: 2017-12-03 | End: 2017-12-07 | Stop reason: SDUPTHER

## 2017-12-03 RX ADMIN — ONDANSETRON 4 MG: 2 INJECTION INTRAMUSCULAR; INTRAVENOUS at 14:19

## 2017-12-03 RX ADMIN — MORPHINE SULFATE 4 MG: 4 INJECTION INTRAVENOUS at 14:20

## 2017-12-03 RX ADMIN — KETOROLAC TROMETHAMINE 30 MG: 30 INJECTION, SOLUTION INTRAMUSCULAR at 14:20

## 2017-12-03 ASSESSMENT — PAIN SCALES - GENERAL
PAINLEVEL_OUTOF10: 8
PAINLEVEL_OUTOF10: 3

## 2017-12-03 NOTE — DISCHARGE INSTRUCTIONS
"Ovarian Cyst  An ovarian cyst is a fluid-filled sac that forms on an ovary. The ovaries are small organs that produce eggs in women. Various types of cysts can form on the ovaries. Most are not cancerous. Many do not cause problems, and they often go away on their own. Some may cause symptoms and require treatment. Common types of ovarian cysts include:  · Functional cysts--These cysts may occur every month during the menstrual cycle. This is normal. The cysts usually go away with the next menstrual cycle if the woman does not get pregnant. Usually, there are no symptoms with a functional cyst.  · Endometrioma cysts--These cysts form from the tissue that lines the uterus. They are also called \"chocolate cysts\" because they become filled with blood that turns brown. This type of cyst can cause pain in the lower abdomen during intercourse and with your menstrual period.  · Cystadenoma cysts--This type develops from the cells on the outside of the ovary. These cysts can get very big and cause lower abdomen pain and pain with intercourse. This type of cyst can twist on itself, cut off its blood supply, and cause severe pain. It can also easily rupture and cause a lot of pain.  · Dermoid cysts--This type of cyst is sometimes found in both ovaries. These cysts may contain different kinds of body tissue, such as skin, teeth, hair, or cartilage. They usually do not cause symptoms unless they get very big.  · Theca lutein cysts--These cysts occur when too much of a certain hormone (human chorionic gonadotropin) is produced and overstimulates the ovaries to produce an egg. This is most common after procedures used to assist with the conception of a baby (in vitro fertilization).  CAUSES   · Fertility drugs can cause a condition in which multiple large cysts are formed on the ovaries. This is called ovarian hyperstimulation syndrome.  · A condition called polycystic ovary syndrome can cause hormonal imbalances that can lead to " nonfunctional ovarian cysts.  SIGNS AND SYMPTOMS   Many ovarian cysts do not cause symptoms. If symptoms are present, they may include:  · Pelvic pain or pressure.  · Pain in the lower abdomen.  · Pain during sexual intercourse.  · Increasing girth (swelling) of the abdomen.  · Abnormal menstrual periods.  · Increasing pain with menstrual periods.  · Stopping having menstrual periods without being pregnant.  DIAGNOSIS   These cysts are commonly found during a routine or annual pelvic exam. Tests may be ordered to find out more about the cyst. These tests may include:  · Ultrasound.  · X-ray of the pelvis.  · CT scan.  · MRI.  · Blood tests.  TREATMENT   Many ovarian cysts go away on their own without treatment. Your health care provider may want to check your cyst regularly for 2-3 months to see if it changes. For women in menopause, it is particularly important to monitor a cyst closely because of the higher rate of ovarian cancer in menopausal women. When treatment is needed, it may include any of the following:  · A procedure to drain the cyst (aspiration). This may be done using a long needle and ultrasound. It can also be done through a laparoscopic procedure. This involves using a thin, lighted tube with a tiny camera on the end (laparoscope) inserted through a small incision.  · Surgery to remove the whole cyst. This may be done using laparoscopic surgery or an open surgery involving a larger incision in the lower abdomen.  · Hormone treatment or birth control pills. These methods are sometimes used to help dissolve a cyst.  HOME CARE INSTRUCTIONS   · Only take over-the-counter or prescription medicines as directed by your health care provider.  · Follow up with your health care provider as directed.  · Get regular pelvic exams and Pap tests.  SEEK MEDICAL CARE IF:   · Your periods are late, irregular, or painful, or they stop.  · Your pelvic pain or abdominal pain does not go away.  · Your abdomen becomes  larger or swollen.  · You have pressure on your bladder or trouble emptying your bladder completely.  · You have pain during sexual intercourse.  · You have feelings of fullness, pressure, or discomfort in your stomach.  · You lose weight for no apparent reason.  · You feel generally ill.  · You become constipated.  · You lose your appetite.  · You develop acne.  · You have an increase in body and facial hair.  · You are gaining weight, without changing your exercise and eating habits.  · You think you are pregnant.  SEEK IMMEDIATE MEDICAL CARE IF:   · You have increasing abdominal pain.  · You feel sick to your stomach (nauseous), and you throw up (vomit).  · You develop a fever that comes on suddenly.  · You have abdominal pain during a bowel movement.  · Your menstrual periods become heavier than usual.  MAKE SURE YOU:  · Understand these instructions.  · Will watch your condition.  · Will get help right away if you are not doing well or get worse.     This information is not intended to replace advice given to you by your health care provider. Make sure you discuss any questions you have with your health care provider.     Document Released: 12/18/2006 Document Revised: 12/23/2014 Document Reviewed: 08/25/2014  [employer] Patient Education ©2016 [employer]

## 2017-12-03 NOTE — ED PROVIDER NOTES
"ED Provider Note    CHIEF COMPLAINT  Chief Complaint   Patient presents with   • Flank Pain       HPI  Sonya Flor is a 36 y.o. female who presents with a history of gastric sleeve surgery, kidney stones, who presents with 1st abdominal pain starting 3 days ago that was diffuse and then now severe right flank pain. She cannot find a comfortable position. She denies any fever. She denies any burning with urination. She has had nausea, no diarrhea. She describes her symptoms as severe. She says that she has had kidney stones but it was when she was very young and is not sure that these are the same symptoms.    REVIEW OF SYSTEMS  See HPI for further details. All other systems are negative.     PAST MEDICAL HISTORY   has a past medical history of Dyspnea (2017) and MDD (major depressive disorder).    SOCIAL HISTORY  Social History     Social History Main Topics   • Smoking status: Former Smoker     Packs/day: 0.00     Quit date: 2/4/2017   • Smokeless tobacco: Never Used   • Alcohol use No   • Drug use: No   • Sexual activity: Yes     Partners: Male     Birth control/ protection: Condom       SURGICAL HISTORY   has a past surgical history that includes gastric resection (2012) and cholecystectomy.    CURRENT MEDICATIONS  Home Medications     Reviewed by Nato Cunningham (Pharmacy Tech) on 12/03/17 at 1332  Med List Status: Complete   Medication Last Dose Status   acetaminophen (TYLENOL) 500 MG Tab 12/2/2017 Active   Aspirin Effervescent (MIKA-SELTZER PO) 12/2/2017 Active   DM-Doxylamine-Acetaminophen (QC NIGHTTIME COLD/FLU RELIEF PO) 12/2/2017 Active   fluoxetine (PROZAC) 20 MG Cap 12/2/2017 Active   gabapentin (NEURONTIN) 300 MG Cap 12/2/2017 Active   ibuprofen (MOTRIN) 200 MG Tab 12/2/2017 Active                ALLERGIES  No Known Allergies    PHYSICAL EXAM  VITAL SIGNS: BP (!) 168/82   Pulse 77   Temp 35.9 °C (96.7 °F)   Resp 20   Ht 1.651 m (5' 5\")   Wt (!) 163.9 kg (361 lb 5.3 oz)   SpO2 96%   BMI " 60.13 kg/m²  @SAMREEN[[id number]::@   Pulse ox interpretation: I interpret this pulse ox as normal.  Constitutional: Alert, The patient prefers to stand instead of laying down, she cannot find a comfortable position.  HENT: No signs of trauma, Bilateral external ears normal, Nose normal.   Eyes: Pupils are equal and reactive, Conjunctiva normal, Non-icteric.   Neck: Normal range of motion, No tenderness, Supple, No stridor.   Lymphatic: No lymphadenopathy noted.   Cardiovascular: Regular rate and rhythm, no murmurs.   Thorax & Lungs: Normal breath sounds, No respiratory distress, No wheezing, No chest tenderness.   Abdomen: Bowel sounds normal, Soft, No tenderness, No masses, No pulsatile masses. No peritoneal signs.  Skin: Warm, Dry, No erythema, No rash.   Back: No bony tenderness, No CVA tenderness.   Extremities: Intact distal pulses, No edema, No tenderness, No cyanosis.  Musculoskeletal: Good range of motion in all major joints. No tenderness to palpation or major deformities noted.   Neurologic: Alert , Normal motor function, Normal sensory function, No focal deficits noted.   Psychiatric: Affect normal, Judgment normal, Mood normal.       DIAGNOSTIC STUDIES / PROCEDURES        LABS  Labs Reviewed   CBC WITH DIFFERENTIAL - Abnormal; Notable for the following:        Result Value    WBC 13.0 (*)     MCHC 33.5 (*)     Lymphocytes 20.70 (*)     Neutrophils (Absolute) 9.24 (*)     All other components within normal limits   COMP METABOLIC PANEL - Abnormal; Notable for the following:     Glucose 140 (*)     All other components within normal limits   POC UA - Abnormal; Notable for the following:     POC Appearance Slightly Cloudy (*)     POC Ketones 15 (*)     POC Specific Gravity >=1.030 (*)     POC Protein 30 (*)     All other components within normal limits   LIPASE   HCG QUAL SERUM   ESTIMATED GFR   URINALYSIS,CULTURE IF INDICATED   POC URINE PREGNANCY   POC URINALYSIS   POC URINE PREGNANCY         RADIOLOGY  CT-RENAL  COLIC EVALUATION(A/P W/O)   Final Result      1.  There is a nonobstructive 6 mm left lower pole calyceal calcification.   2.   There is an incidental mildly complex cyst in the right ovary.              COURSE & MEDICAL DECISION MAKING  Pertinent Labs & Imaging studies reviewed. (See chart for details)    Differential diagnosis: UTI, ureteral colic, appendicitis, biliary colic    An IV was established, the patient was given Toradol 30 g IV, morphine 400 g IV, Zofran 4 mg IV.    The patient has an incidental nonobstructing 6 mm right kidney stone. The patient has a complex ovarian cyst on the left. This likely is causing her pain. We do not have a gynecologist on-call at [facility], if the patient worsens she will return to [facility]. She will follow up with her primary care doctor, I given her instructions to get a gynecology referral to have the ovarian cyst evaluated.    The patient is prescribed Percocet and Zofran.    I reviewed prescription monitoring program for patient's narcotic use before prescribing a scheduled drug.The patient will not drink alcohol nor drive with prescribed medications. The patient will return for new or worsening symptoms and is stable at the time of discharge.    The patient is referred to her primary physician for blood pressure management, diabetic screening, and for all other preventative health concerns.    DISPOSITION:  Patient will be discharged home in stable condition.    FOLLOW UP:  [facility], Emergency Dept  [address] [address] [phone]    If symptoms worsen    Bon Burks, A.P.R.N.  [address]  [facility] [id number] [phone]      follow up, bring CT results, ask for gynecolgy follow up referal      OUTPATIENT MEDICATIONS:  New Prescriptions    ONDANSETRON (ZOFRAN ODT) 4 MG TABLET DISPERSIBLE    Take 1 Tab by mouth every 8 hours as needed.    OXYCODONE-ACETAMINOPHEN (PERCOCET)  MG TAB    Take  1-2 Tabs by mouth every 6 hours as needed (pain).       The patient will not drink alcohol nor drive with prescribed medications. The patient will return for worsening symptoms and is stable at the time of discharge. The patient verbalizes understanding and will comply.    FINAL IMPRESSION  1. Acute right complex ovarian cyst  2. Left 6 mm nonobstructing kidney stone  3.         Electronically signed by: Antoine Trevino, 12/3/2017 1:52 PM

## 2017-12-03 NOTE — ED NOTES
Pt provided with water, encouraged to drink. Urine collection supplies provided, requested to give sample.

## 2017-12-03 NOTE — ED NOTES
Onset Friday of right low flank pain, no hx of trauma. She did co of a sour stomach, +vomiting, decreased appetite. No UTI sx.

## 2017-12-03 NOTE — ED NOTES
"Med rec updated and complete  Allergies reviewed  Pt states \"No antibiotics in the last 30 days, that she had to take at home\".  I informed the nurse regarding pts medications last night.  Pt states \"No vitamins\".    "

## 2017-12-07 ENCOUNTER — OFFICE VISIT (OUTPATIENT)
Dept: MEDICAL GROUP | Facility: PHYSICIAN GROUP | Age: 37
End: 2017-12-07
Payer: COMMERCIAL

## 2017-12-07 ENCOUNTER — HOSPITAL ENCOUNTER (OUTPATIENT)
Facility: MEDICAL CENTER | Age: 37
End: 2017-12-07
Attending: NURSE PRACTITIONER
Payer: COMMERCIAL

## 2017-12-07 VITALS
HEART RATE: 82 BPM | TEMPERATURE: 98.2 F | BODY MASS INDEX: 48.82 KG/M2 | SYSTOLIC BLOOD PRESSURE: 156 MMHG | HEIGHT: 65 IN | RESPIRATION RATE: 18 BRPM | DIASTOLIC BLOOD PRESSURE: 88 MMHG | WEIGHT: 293 LBS | OXYGEN SATURATION: 95 %

## 2017-12-07 DIAGNOSIS — M54.50 ACUTE RIGHT-SIDED LOW BACK PAIN WITHOUT SCIATICA: ICD-10-CM

## 2017-12-07 DIAGNOSIS — E66.01 MORBID OBESITY WITH BMI OF 60.0-69.9, ADULT (HCC): ICD-10-CM

## 2017-12-07 DIAGNOSIS — I10 ESSENTIAL HYPERTENSION: ICD-10-CM

## 2017-12-07 LAB
APPEARANCE UR: CLEAR
BILIRUB UR STRIP-MCNC: NORMAL MG/DL
COLOR UR AUTO: NORMAL
GLUCOSE UR STRIP.AUTO-MCNC: NORMAL MG/DL
KETONES UR STRIP.AUTO-MCNC: NORMAL MG/DL
LEUKOCYTE ESTERASE UR QL STRIP.AUTO: NORMAL
NITRITE UR QL STRIP.AUTO: NORMAL
PH UR STRIP.AUTO: 7 [PH] (ref 5–8)
PROT UR QL STRIP: NORMAL MG/DL
RBC UR QL AUTO: NORMAL
SP GR UR STRIP.AUTO: 1.02
UROBILINOGEN UR STRIP-MCNC: 0.2 MG/DL

## 2017-12-07 PROCEDURE — 87086 URINE CULTURE/COLONY COUNT: CPT

## 2017-12-07 PROCEDURE — 81002 URINALYSIS NONAUTO W/O SCOPE: CPT | Performed by: NURSE PRACTITIONER

## 2017-12-07 PROCEDURE — 99000 SPECIMEN HANDLING OFFICE-LAB: CPT | Performed by: NURSE PRACTITIONER

## 2017-12-07 PROCEDURE — 99214 OFFICE O/P EST MOD 30 MIN: CPT | Performed by: NURSE PRACTITIONER

## 2017-12-07 RX ORDER — ONDANSETRON 4 MG/1
4 TABLET, ORALLY DISINTEGRATING ORAL EVERY 8 HOURS PRN
Qty: 30 TAB | Refills: 0 | Status: SHIPPED | OUTPATIENT
Start: 2017-12-07 | End: 2019-07-16 | Stop reason: SDUPTHER

## 2017-12-07 RX ORDER — OXYCODONE AND ACETAMINOPHEN 10; 325 MG/1; MG/1
1 TABLET ORAL EVERY 6 HOURS PRN
Qty: 56 TAB | Refills: 0 | Status: SHIPPED | OUTPATIENT
Start: 2017-12-07 | End: 2017-12-21

## 2017-12-07 ASSESSMENT — PAIN SCALES - GENERAL: PAINLEVEL: NO PAIN

## 2017-12-07 NOTE — ASSESSMENT & PLAN NOTE
Chronic in nature. Stable. Patient to follow up with bariatric surgeon who recommended revision. Patient states that she will have to follow up regarding this but did not have the money for the co-pay.

## 2017-12-07 NOTE — PROGRESS NOTES
Chief Complaint   Patient presents with   • Hospital Follow-up     cyst on ovary       HISTORY OF PRESENT ILLNESS: Patient is a 36 y.o. female established patient who presents today toFollow-up from hospital.    Acute right-sided low back pain without sciatica  This is a new problem. Started several days ago. Patient was seen in the emergency room and had a CT scan which showed a left-sided kidney stone nonobstructing as well as right-sided complex ovarian cyst. Patient states the pain is 8-9 out of 10, states 5-6 out of 10 with pain medication states that it is in her right lower back states there is no change in the pain is constant sharp pain states that nothing makes it worse or better states she has tried ice, over-the-counter ibuprofen and Tylenol, changing positions. Denies numbness or tingling down her leg, denies pain with pressure on the area that she has also had some pressure in her right lower quadrant in the front. CT did not show any evidence of appendicitis.    Essential hypertension  Chronic in nature. Blood pressure today is 156/88. Patient states that swelling is good refuses medication for blood pressure. Denies chest pain, palpitations, dizziness, headache, blurry vision.    Morbid obesity with BMI of 60.0-69.9, adult (CMS-[facility])  Chronic in nature. Stable. Patient to follow up with bariatric surgeon who recommended revision. Patient states that she will have to follow up regarding this but did not have the money for the co-pay.      Patient Active Problem List    Diagnosis Date Noted   • Acute right-sided low back pain without sciatica 12/07/2017   • Bilateral lower extremity edema 08/10/2017   • Vagina itching 08/10/2017   • Acute cystitis without hematuria 08/10/2017   • Essential hypertension 07/14/2017   • Snoring 07/14/2017   • Periumbilical abdominal pain 07/14/2017   • Moderate single current episode of major depressive disorder (CMS-HCC) 04/04/2017   • Anxiety 04/04/2017   • Restless legs  syndrome (RLS) 04/04/2017   • Morbid obesity with BMI of 60.0-69.9, adult (CMS-HCC) 04/04/2017       Allergies:Patient has no known allergies.    Current Outpatient Prescriptions   Medication Sig Dispense Refill   • oxycodone-acetaminophen (PERCOCET)  MG Tab Take 1 Tab by mouth every 6 hours as needed for Severe Pain (pain) for up to 14 days. 56 Tab 0   • ondansetron (ZOFRAN ODT) 4 MG TABLET DISPERSIBLE Take 1 Tab by mouth every 8 hours as needed. 30 Tab 0   • gabapentin (NEURONTIN) 300 MG Cap Take 300 mg by mouth every bedtime.     • ibuprofen (MOTRIN) 200 MG Tab Take 800 mg by mouth every 6 hours as needed for Mild Pain.     • Aspirin Effervescent (MIKA-SELTZER PO) Take 2 Tabs by mouth PRN (For upset stomach).     • acetaminophen (TYLENOL) 500 MG Tab Take 1,000 mg by mouth every 6 hours as needed for Moderate Pain.     • DM-Doxylamine-Acetaminophen (QC NIGHTTIME COLD/FLU RELIEF PO) Take 30 mL by mouth PRN (For sleep).     • fluoxetine (PROZAC) 20 MG Cap Take 1 Cap by mouth every day. 90 Cap 3     No current facility-administered medications for this visit.        Social History   Substance Use Topics   • Smoking status: Former Smoker     Packs/day: 0.00     Quit date: 2/4/2017   • Smokeless tobacco: Never Used   • Alcohol use No       Family Status   Relation Status   • Mother Alive   • Father Alive   • Maternal Grandmother    • Paternal Grandfather      Family History   Problem Relation Age of Onset   • Alcohol/Drug Mother    • Psychiatry Mother    • Heart Disease Father 50     stents   • Diabetes Maternal Grandmother    • Heart Disease Paternal Grandfather        Review of Systems:   Constitutional:  Negative for fever, chills, weight loss and malaise/fatigue.   HEENT:  Negative for ear pain, nosebleeds, congestion, sore throat and neck pain.    Eyes:  Negative for blurred vision.   Respiratory:  Negative for cough, sputum production, shortness of breath and wheezing.    Cardiovascular:  Negative for chest  "pain, palpitations, orthopnea and leg swelling.   Gastrointestinal:  Negative for heartburn, nausea, vomiting and abdominal pain.   Genitourinary:  Negative for dysuria, urgency and frequency.   Musculoskeletal: Positive for myalgias, back pain and joint pain.   Skin:  Negative for rash and itching.   Neurological:  Negative for dizziness, tingling, tremors, sensory change, focal weakness and headaches.   Endo/Heme/Allergies:  Does not bruise/bleed easily.   Psychiatric/Behavioral:  Negative for depression, suicidal ideas and memory loss.  The patient is not nervous/anxious and does not have insomnia.    All other systems reviewed and are negative except as in HPI.    Exam:  Blood pressure 156/88, pulse 82, temperature 36.8 °C (98.2 °F), resp. rate 18, height 1.651 m (5' 5\"), weight (!) 167.8 kg (370 lb), SpO2 95 %, not currently breastfeeding.  General:  Normal appearing. Frequent grimacing with movement. No distress.  Pulmonary:  Clear to ausculation.  Normal effort. No rales, ronchi, or wheezing.  Cardiovascular:  Regular rate and rhythm without murmur. Carotid and radial pulses are intact and equal bilaterally.  Abdomen:  Soft, nontender, nondistended. Normal bowel sounds. Liver and spleen are not palpable. No tenderness to palpation in right lower quadrant no guarding, no rebound tenderness. There is no pain to pressure in right lower back where patient notes pain patient states the pain feels \"deep\".  Neurologic:  Grossly nonfocal  Lymph:  No cervical, supraclavicular or axillary lymph nodes are palpable  Skin:  Warm and dry.  No obvious lesions.  Musculoskeletal:  Normal gait. No extremity cyanosis, clubbing, or edema. Full spine range of motion intact without increased pain.  Psych:  Normal mood and affect. Alert and oriented x3. Judgment and insight is normal.      PLAN:    1. Acute right-sided low back pain without sciatica  Urgent referral provided to gynecology, patient will call to let this provider " know when she is able to get an appointment. Patient is provided a refill on Zofran related to nausea as well as pain medication for 2 weeks, discussed with patient importance of taking his pain medication as possible. Advised no drinking etoh, driving, or operating heavy equipment while taking.  Pt was advised of the sedation effect of these medications.   Discussed that this is a relatively high-dose pain medication, and this would not be continued long term. Urine culture ordered follow-up on blood on patient's possibly related to kidney stone.  - oxycodone-acetaminophen (PERCOCET)  MG Tab; Take 1 Tab by mouth every 6 hours as needed for Severe Pain (pain) for up to 14 days.  Dispense: 56 Tab; Refill: 0  - REFERRAL TO GYNECOLOGY  - ondansetron (ZOFRAN ODT) 4 MG TABLET DISPERSIBLE; Take 1 Tab by mouth every 8 hours as needed.  Dispense: 30 Tab; Refill: 0  - POCT Urinalysis  - URINE CULTURE    2. Essential hypertension  Patient is encouraged to follow up regarding this issue. Refuses medication at this time.    3. Morbid obesity with BMI of 60.0-69.9, adult (CMS-HCC)  Continue follow-up with bariatrics.    Follow-up in 2 weeks or sooner as needed. Patient is encouraged to be seen in the emergency room for chest pain, palpitations, shortness of breath, dizziness, severe abdominal pain or other concerning symptoms.    Please note that this dictation was created using voice recognition software. I have made every reasonable attempt to correct obvious errors, but I expect that there are errors of grammar and possibly content that I did not discover before finalizing the note.    Assessment/Plan:  1. Acute right-sided low back pain without sciatica  oxycodone-acetaminophen (PERCOCET)  MG Tab    REFERRAL TO GYNECOLOGY    ondansetron (ZOFRAN ODT) 4 MG TABLET DISPERSIBLE    POCT Urinalysis    URINE CULTURE   2. Essential hypertension     3. Morbid obesity with BMI of 60.0-69.9, adult (CMS-HCC)            I have  placed the below orders and discussed them with an approved delegating provider. The MA is performing the below orders under the direction of Dr. Ahumada.

## 2017-12-07 NOTE — ASSESSMENT & PLAN NOTE
This is a new problem. Started several days ago. Patient was seen in the emergency room and had a CT scan which showed a left-sided kidney stone nonobstructing as well as right-sided complex ovarian cyst. Patient states the pain is 8-9 out of 10, states 5-6 out of 10 with pain medication states that it is in her right lower back states there is no change in the pain is constant sharp pain states that nothing makes it worse or better states she has tried ice, over-the-counter ibuprofen and Tylenol, changing positions. Denies numbness or tingling down her leg, denies pain with pressure on the area that she has also had some pressure in her right lower quadrant in the front. CT did not show any evidence of appendicitis.

## 2017-12-07 NOTE — ASSESSMENT & PLAN NOTE
Chronic in nature. Blood pressure today is 156/88. Patient states that swelling is good refuses medication for blood pressure. Denies chest pain, palpitations, dizziness, headache, blurry vision.

## 2017-12-10 LAB
BACTERIA UR CULT: NORMAL
SIGNIFICANT IND 70042: NORMAL
SOURCE SOURCE: NORMAL

## 2017-12-11 ENCOUNTER — TELEPHONE (OUTPATIENT)
Dept: MEDICAL GROUP | Facility: PHYSICIAN GROUP | Age: 37
End: 2017-12-11

## 2017-12-11 NOTE — TELEPHONE ENCOUNTER
----- Message from RANJAN Villa sent at 12/11/2017 10:40 AM PST -----  Please call pt and give lab results: Urine culture is negative at this time.

## 2017-12-21 ENCOUNTER — TELEPHONE (OUTPATIENT)
Dept: MEDICAL GROUP | Facility: PHYSICIAN GROUP | Age: 37
End: 2017-12-21

## 2017-12-21 NOTE — TELEPHONE ENCOUNTER
VOICEMAIL  1. Caller Name: Sonya Flor                        Call Back Number: [phone] (home)       2. Message: Patient called and left a VM from call I left earlier. Called patient back and left a VM.Advised her she can use mychart as well. LM     3. Patient approves office to leave a detailed voicemail/MyChart message: yes

## 2017-12-21 NOTE — TELEPHONE ENCOUNTER
VOICEMAIL  1. Caller Name: Sonya Flor                        Call Back Number: [phone] (home)       2. Message: Patient called and left a VM. Patient said she didn't know she had an appointment 12/19/2017. Patient is still in a lot of pain and said she wanted to go to ER today. Patient also said she had some questions for Bon. Called patient back and left a VM. Lm     3. Patient approves office to leave a detailed voicemail/MyChart message: N\A

## 2018-01-02 ENCOUNTER — TELEPHONE (OUTPATIENT)
Dept: MEDICAL GROUP | Facility: PHYSICIAN GROUP | Age: 38
End: 2018-01-02

## 2018-01-02 DIAGNOSIS — R11.0 NAUSEA: ICD-10-CM

## 2018-01-02 RX ORDER — ONDANSETRON 4 MG/1
4 TABLET, FILM COATED ORAL EVERY 4 HOURS PRN
Qty: 20 TAB | Refills: 0 | Status: SHIPPED | OUTPATIENT
Start: 2018-01-02 | End: 2019-07-23

## 2018-01-02 NOTE — TELEPHONE ENCOUNTER
Phone Number Called: [phone] (home)     Message: Pt notified     Left Message for patient to call back: N\A

## 2018-01-02 NOTE — TELEPHONE ENCOUNTER
Pt called in stating she is in a lot of pain while she waits to get into gynecology. Pt stated she had been prescribed percocet for management of her pain, but does no like the way they make her feel, or the withdrawal sx she experiences. Pt states she has been taking OTC APAP and Ibuprofen, but would like something else for the pain. Pt stated tramadol has worked well for her in the past. Pt stated she has also been extremely nauseated. Pt stated that she was given an RX for Zofran after a visit to the Er, and they helped a lot with her nausea. Informed pt she would need to make an appointment to be prescribed Tramadol. Pt stated she would schedule an appointment with Bon. Pt asked if she could have an RX for Zofran in the meantime to help with the nausea. I explained to pt I would put in a request, but couldn't guarantee a refill as it isn't a regularly prescribed medication. Please advise. TW

## 2018-02-09 DIAGNOSIS — F41.9 ANXIETY: ICD-10-CM

## 2018-02-09 RX ORDER — HYDROXYZINE 50 MG/1
50 TABLET, FILM COATED ORAL 3 TIMES DAILY PRN
Qty: 90 TAB | Refills: 0 | Status: SHIPPED | OUTPATIENT
Start: 2018-02-09 | End: 2018-04-17 | Stop reason: SDUPTHER

## 2018-04-17 DIAGNOSIS — F41.9 ANXIETY: ICD-10-CM

## 2018-04-17 RX ORDER — HYDROXYZINE 50 MG/1
TABLET, FILM COATED ORAL
Qty: 90 TAB | Refills: 0 | Status: SHIPPED | OUTPATIENT
Start: 2018-04-17 | End: 2018-06-18 | Stop reason: SDUPTHER

## 2018-06-18 DIAGNOSIS — F41.9 ANXIETY: ICD-10-CM

## 2018-06-18 DIAGNOSIS — F32.1 MODERATE SINGLE CURRENT EPISODE OF MAJOR DEPRESSIVE DISORDER (HCC): ICD-10-CM

## 2018-06-18 RX ORDER — FLUOXETINE HYDROCHLORIDE 20 MG/1
CAPSULE ORAL
Qty: 90 CAP | Refills: 0 | Status: SHIPPED | OUTPATIENT
Start: 2018-06-18 | End: 2019-07-23 | Stop reason: SDUPTHER

## 2018-06-18 RX ORDER — HYDROXYZINE 50 MG/1
TABLET, FILM COATED ORAL
Qty: 90 TAB | Refills: 0 | Status: SHIPPED | OUTPATIENT
Start: 2018-06-18 | End: 2019-07-23 | Stop reason: SDUPTHER

## 2019-06-20 ENCOUNTER — GYNECOLOGY VISIT (OUTPATIENT)
Dept: OBGYN | Facility: CLINIC | Age: 39
End: 2019-06-20
Payer: MEDICAID

## 2019-06-20 VITALS
DIASTOLIC BLOOD PRESSURE: 82 MMHG | BODY MASS INDEX: 48.82 KG/M2 | SYSTOLIC BLOOD PRESSURE: 140 MMHG | WEIGHT: 293 LBS | HEIGHT: 65 IN

## 2019-06-20 DIAGNOSIS — N93.8 DUB (DYSFUNCTIONAL UTERINE BLEEDING): Primary | ICD-10-CM

## 2019-06-20 DIAGNOSIS — Z32.01 POSITIVE PREGNANCY TEST: ICD-10-CM

## 2019-06-20 LAB
INT CON NEG: NEGATIVE
INT CON POS: POSITIVE
POC URINE PREGNANCY TEST: POSITIVE

## 2019-06-20 PROCEDURE — 81025 URINE PREGNANCY TEST: CPT | Performed by: NURSE PRACTITIONER

## 2019-06-20 PROCEDURE — 99213 OFFICE O/P EST LOW 20 MIN: CPT | Performed by: NURSE PRACTITIONER

## 2019-06-20 ASSESSMENT — ENCOUNTER SYMPTOMS
GASTROINTESTINAL NEGATIVE: 1
PSYCHIATRIC NEGATIVE: 1
MUSCULOSKELETAL NEGATIVE: 1
CARDIOVASCULAR NEGATIVE: 1
RESPIRATORY NEGATIVE: 1
EYES NEGATIVE: 1
CONSTITUTIONAL NEGATIVE: 1
NEUROLOGICAL NEGATIVE: 1

## 2019-06-20 NOTE — PROGRESS NOTES
"Subjective:      Sonya Flor is a 38 y.o. female who presents with Gynecologic Exam (DUB)    Sonya is a  with hx of 3 full term , 1 full term c/s, and 2 early first trimester TAB.  Youngest child is 16 years old.  This is an unplanned pregnancy, unsure of FOB involvement.  Her LMP was 19 giving her an EDDIE of 2020 and making her 9 weeks. She is pretty confident of LMP, but states they can be irregular and very light.  She has a health history positive for chronic hypertension, chronic edema, and obesity.  She has a social history positive for drug use including methamphetamines and heavy alcohol use. Has been clean now for >6 months and is not using anything nor tobacco.  She has a surgical history significant for gastric sleeve 7 years ago, but has gained most of the weight back.  Last pap was  and negative, needs repeat in     She currently only takes PNV. Was taking Lisinopril for HTN, but doesn't like the side effects. Will likely need to start medication for hypertension, but wants to discuss at next appt.    Discussed that at this time, due to early GA, we would not be able to hear or see fetal heart tones, and that she needs to make NOB appt for 3-4 weeks.    HPI    Review of Systems   Constitutional: Negative.    HENT: Negative.    Eyes: Negative.    Respiratory: Negative.    Cardiovascular: Negative.    Gastrointestinal: Negative.    Genitourinary: Negative.    Musculoskeletal: Negative.    Skin: Negative.    Neurological: Negative.    Endo/Heme/Allergies: Negative.    Psychiatric/Behavioral: Negative.    All other systems reviewed and are negative.         Objective:     /82 (BP Location: Left arm, Patient Position: Sitting)   Ht 1.651 m (5' 5\")   Wt (!) 168.3 kg (371 lb)   LMP 2019   BMI 61.74 kg/m²      Physical Exam   Constitutional: She is oriented to person, place, and time. She appears well-developed and well-nourished.   HENT:   Head: Normocephalic. "   Eyes: Conjunctivae are normal.   Neck: Normal range of motion.   Cardiovascular: Normal rate, regular rhythm and normal heart sounds.    Pulmonary/Chest: Effort normal and breath sounds normal.   Abdominal: Soft.   Musculoskeletal: Normal range of motion.   Neurological: She is alert and oriented to person, place, and time.   Skin: Skin is warm and dry.   Psychiatric: She has a normal mood and affect. Her behavior is normal. Judgment and thought content normal.   Nursing note and vitals reviewed.       Assessment/Plan:     Patient was seen for 20 minutes face to face of which > 50% of appointment time was spent on counseling and coordination of care regarding the above.    1. DUB (dysfunctional uterine bleeding)  Positive pregnancy test  - POCT Pregnancy    2. Positive pregnancy test  LMP 4/18/19- with EDDIE 1/23/2020    Jovita Ambrosio CNM, APRN

## 2019-06-20 NOTE — NON-PROVIDER
Patient here for DUB.  LMP=  4/18/19 exact date   EDDIE= 1/23/20  GA=    9w0d  Last pap  08/2017 Negative   Phone number: [phone]  Pharmacy verified  c/o

## 2019-07-16 ENCOUNTER — INITIAL PRENATAL (OUTPATIENT)
Dept: OBGYN | Facility: CLINIC | Age: 39
End: 2019-07-16
Payer: MEDICAID

## 2019-07-16 ENCOUNTER — HOSPITAL ENCOUNTER (OUTPATIENT)
Facility: MEDICAL CENTER | Age: 39
End: 2019-07-16
Attending: NURSE PRACTITIONER
Payer: MEDICAID

## 2019-07-16 VITALS
HEIGHT: 65 IN | BODY MASS INDEX: 48.82 KG/M2 | WEIGHT: 293 LBS | DIASTOLIC BLOOD PRESSURE: 94 MMHG | SYSTOLIC BLOOD PRESSURE: 128 MMHG

## 2019-07-16 DIAGNOSIS — M54.50 ACUTE RIGHT-SIDED LOW BACK PAIN WITHOUT SCIATICA: ICD-10-CM

## 2019-07-16 DIAGNOSIS — Z34.82 ENCOUNTER FOR SUPERVISION OF OTHER NORMAL PREGNANCY IN SECOND TRIMESTER: ICD-10-CM

## 2019-07-16 DIAGNOSIS — Z98.891 HISTORY OF CESAREAN SECTION: ICD-10-CM

## 2019-07-16 DIAGNOSIS — O09.92 ENCOUNTER FOR SUPERVISION OF HIGH RISK PREGNANCY IN SECOND TRIMESTER, ANTEPARTUM: Primary | ICD-10-CM

## 2019-07-16 DIAGNOSIS — F41.9 ANXIETY: ICD-10-CM

## 2019-07-16 DIAGNOSIS — O09.92 ENCOUNTER FOR SUPERVISION OF HIGH RISK PREGNANCY IN SECOND TRIMESTER, ANTEPARTUM: ICD-10-CM

## 2019-07-16 DIAGNOSIS — I10 ESSENTIAL HYPERTENSION: ICD-10-CM

## 2019-07-16 LAB
APPEARANCE UR: CLEAR
BILIRUB UR STRIP-MCNC: NORMAL MG/DL
COLOR UR AUTO: YELLOW
GLUCOSE UR STRIP.AUTO-MCNC: NEGATIVE MG/DL
KETONES UR STRIP.AUTO-MCNC: NEGATIVE MG/DL
LEUKOCYTE ESTERASE UR QL STRIP.AUTO: NEGATIVE
NITRITE UR QL STRIP.AUTO: NEGATIVE
PH UR STRIP.AUTO: 7 [PH] (ref 5–8)
PROT UR QL STRIP: NEGATIVE MG/DL
RBC UR QL AUTO: NORMAL
SP GR UR STRIP.AUTO: 1.02
UROBILINOGEN UR STRIP-MCNC: NORMAL MG/DL

## 2019-07-16 PROCEDURE — 0500F INITIAL PRENATAL CARE VISIT: CPT | Performed by: NURSE PRACTITIONER

## 2019-07-16 PROCEDURE — 81002 URINALYSIS NONAUTO W/O SCOPE: CPT | Performed by: NURSE PRACTITIONER

## 2019-07-16 PROCEDURE — 87491 CHLMYD TRACH DNA AMP PROBE: CPT

## 2019-07-16 PROCEDURE — 87591 N.GONORRHOEAE DNA AMP PROB: CPT

## 2019-07-16 RX ORDER — ONDANSETRON 4 MG/1
4 TABLET, ORALLY DISINTEGRATING ORAL EVERY 6 HOURS PRN
Qty: 28 TAB | Refills: 2 | Status: SHIPPED | OUTPATIENT
Start: 2019-07-16 | End: 2020-01-15

## 2019-07-16 RX ORDER — ASPIRIN 81 MG/1
81 TABLET, CHEWABLE ORAL DAILY
Qty: 100 TAB | Refills: 2 | Status: SHIPPED | OUTPATIENT
Start: 2019-07-16 | End: 2021-05-07

## 2019-07-16 RX ORDER — LABETALOL 200 MG/1
200 TABLET, FILM COATED ORAL 2 TIMES DAILY
Qty: 60 TAB | Refills: 3 | Status: SHIPPED | OUTPATIENT
Start: 2019-07-16 | End: 2019-11-04 | Stop reason: SDUPTHER

## 2019-07-16 ASSESSMENT — ENCOUNTER SYMPTOMS
MUSCULOSKELETAL NEGATIVE: 1
NEUROLOGICAL NEGATIVE: 1
PSYCHIATRIC NEGATIVE: 1
CONSTITUTIONAL NEGATIVE: 1
RESPIRATORY NEGATIVE: 1
GASTROINTESTINAL NEGATIVE: 1
CARDIOVASCULAR NEGATIVE: 1
EYES NEGATIVE: 1

## 2019-07-16 NOTE — PROGRESS NOTES
NOB today   had visit to ER on 19 at [facility] =7w2d  Sure about LMP  On PNV  Last pap; 17=negative  Phone # [phone]  Pharmacy confirmed  C/o n/v. Vomiting once a day. Very bad nausea.  Patient not sure if she wants repeat C Section or
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
## 2019-07-16 NOTE — LETTER
Cystic Fibrosis Carrier Testing  Sonya Flor    The following information is about a blood test that can be done to determine if you and/or your partner carry the gene for cystic fibrosis.    WHAT IS CYSTIC FIBROSIS?  · Cystic fibrosis (CF) is an inherited disease that affects more than 25,000 [place] children and young adults.  · Symptoms of CF vary but include lung congestion, pneumonia, diarrhea and poor growth.  Most people with CF have severe medical problems and some die at a young age.  Others have so few symptoms they are unaware they have CF.  · CF does not affect intelligence.  · Although there is no cure for CF at this time, scientists are making progress in improving treatment and in searching for a cure.  In the past many people with CF  at a very young age.  Today, many are living into their 20’s and 30’s.    IS THERE A CHANCE MY BABY COULD HAVE CYSTIC FIBROSIS?  · You can have a child with CF even if there is no history in your family (see chart below).  · CF testing can help determine if you are a carrier and at risk to have a child with CF.  Note: if both parents are carriers, there is a 1 in 4 (25%) chance with each pregnancy that they will have a child with CF.  · Carriers have one normal CF gene and one altered CF gene.  · People with CF have two altered CF genes.  · Most people have two normal copies of the CF gene.    Approximate risk that a couple with no family history of cystic fibrosis will have a child with cystic fibrosis:    Ethnic background / Risk     couple:  1 in 2,500   couple:  1 in 15,000            couple:  1 in 8,000     American couple:  1 in 32,000     WHAT TESTING IS AVAILABLE?  · There is a blood test that can be done to find out if you or your partner is a carrier.  · It is important to understand that CF carrier testing does not detect all CF carriers.  · If the test shows that you are both CF carriers, you unborn baby can be  tested to find out if the baby has CF.    HOW MUCH DOES IT COST TO HAVE CYSTIC FIBROSIS CARRIER TESTING?  · Cost and insurance coverage for CF carrier testing vary depending upon the laboratory used and your insurance policy.  · The average cost for CF carrier testing is $300 per person.  · Your genetic counselor can provide you with more information about cystic fibrosis carrier testing.    _____  Yes, I am interested in discussing carrier testing with a genetic counselor.    _____  No, I am not interested in CF carrier testing or in receiving more information about CF carrier testing.      Client signature: ________________________________________  7/16/2019

## 2019-07-17 LAB
C TRACH DNA SPEC QL NAA+PROBE: NEGATIVE
N GONORRHOEA DNA SPEC QL NAA+PROBE: NEGATIVE
SPECIMEN SOURCE: NORMAL

## 2019-07-23 ENCOUNTER — HOSPITAL ENCOUNTER (OUTPATIENT)
Dept: LAB | Facility: MEDICAL CENTER | Age: 39
End: 2019-07-23
Attending: NURSE PRACTITIONER
Payer: MEDICAID

## 2019-07-23 ENCOUNTER — GYNECOLOGY VISIT (OUTPATIENT)
Dept: OBGYN | Facility: CLINIC | Age: 39
End: 2019-07-23
Payer: MEDICAID

## 2019-07-23 VITALS — WEIGHT: 293 LBS | SYSTOLIC BLOOD PRESSURE: 122 MMHG | DIASTOLIC BLOOD PRESSURE: 66 MMHG | BODY MASS INDEX: 62.24 KG/M2

## 2019-07-23 DIAGNOSIS — F41.9 ANXIETY DURING PREGNANCY: ICD-10-CM

## 2019-07-23 DIAGNOSIS — O99.340 ANXIETY DURING PREGNANCY: ICD-10-CM

## 2019-07-23 DIAGNOSIS — Z91.410 HISTORY OF SEXUAL ABUSE IN ADULTHOOD: ICD-10-CM

## 2019-07-23 DIAGNOSIS — O09.92 ENCOUNTER FOR SUPERVISION OF HIGH RISK PREGNANCY IN SECOND TRIMESTER, ANTEPARTUM: ICD-10-CM

## 2019-07-23 DIAGNOSIS — F33.1 MODERATE EPISODE OF RECURRENT MAJOR DEPRESSIVE DISORDER (HCC): ICD-10-CM

## 2019-07-23 DIAGNOSIS — Z98.891 HISTORY OF CESAREAN SECTION: ICD-10-CM

## 2019-07-23 DIAGNOSIS — Z87.898 HISTORY OF ALCOHOL USE DISORDER: ICD-10-CM

## 2019-07-23 PROBLEM — N89.8 VAGINA ITCHING: Status: RESOLVED | Noted: 2017-08-10 | Resolved: 2019-07-23

## 2019-07-23 PROBLEM — N30.00 ACUTE CYSTITIS WITHOUT HEMATURIA: Status: RESOLVED | Noted: 2017-08-10 | Resolved: 2019-07-23

## 2019-07-23 PROBLEM — M54.50 ACUTE RIGHT-SIDED LOW BACK PAIN WITHOUT SCIATICA: Status: RESOLVED | Noted: 2017-12-07 | Resolved: 2019-07-23

## 2019-07-23 PROBLEM — O26.899 RH NEGATIVE STATE IN ANTEPARTUM PERIOD: Status: ACTIVE | Noted: 2019-07-23

## 2019-07-23 PROBLEM — Z67.91 RH NEGATIVE STATE IN ANTEPARTUM PERIOD: Status: ACTIVE | Noted: 2019-07-23

## 2019-07-23 PROBLEM — R60.0 BILATERAL LOWER EXTREMITY EDEMA: Status: RESOLVED | Noted: 2017-08-10 | Resolved: 2019-07-23

## 2019-07-23 PROBLEM — R10.33 PERIUMBILICAL ABDOMINAL PAIN: Status: RESOLVED | Noted: 2017-07-14 | Resolved: 2019-07-23

## 2019-07-23 LAB
ABO GROUP BLD: NORMAL
APPEARANCE UR: CLEAR
BASOPHILS # BLD AUTO: 0.5 % (ref 0–1.8)
BASOPHILS # BLD: 0.05 K/UL (ref 0–0.12)
BILIRUB UR QL STRIP.AUTO: NEGATIVE
BLD GP AB SCN SERPL QL: NORMAL
COLOR UR: YELLOW
EOSINOPHIL # BLD AUTO: 0.13 K/UL (ref 0–0.51)
EOSINOPHIL NFR BLD: 1.2 % (ref 0–6.9)
ERYTHROCYTE [DISTWIDTH] IN BLOOD BY AUTOMATED COUNT: 41.2 FL (ref 35.9–50)
GLUCOSE UR STRIP.AUTO-MCNC: NEGATIVE MG/DL
HBV SURFACE AG SER QL: NEGATIVE
HCT VFR BLD AUTO: 40.1 % (ref 37–47)
HGB BLD-MCNC: 13.1 G/DL (ref 12–16)
HIV 1+2 AB+HIV1 P24 AG SERPL QL IA: NON REACTIVE
IMM GRANULOCYTES # BLD AUTO: 0.03 K/UL (ref 0–0.11)
IMM GRANULOCYTES NFR BLD AUTO: 0.3 % (ref 0–0.9)
KETONES UR STRIP.AUTO-MCNC: NEGATIVE MG/DL
LEUKOCYTE ESTERASE UR QL STRIP.AUTO: NEGATIVE
LYMPHOCYTES # BLD AUTO: 2.55 K/UL (ref 1–4.8)
LYMPHOCYTES NFR BLD: 23.7 % (ref 22–41)
MCH RBC QN AUTO: 29 PG (ref 27–33)
MCHC RBC AUTO-ENTMCNC: 32.7 G/DL (ref 33.6–35)
MCV RBC AUTO: 88.7 FL (ref 81.4–97.8)
MICRO URNS: ABNORMAL
MONOCYTES # BLD AUTO: 0.58 K/UL (ref 0–0.85)
MONOCYTES NFR BLD AUTO: 5.4 % (ref 0–13.4)
NEUTROPHILS # BLD AUTO: 7.43 K/UL (ref 2–7.15)
NEUTROPHILS NFR BLD: 68.9 % (ref 44–72)
NITRITE UR QL STRIP.AUTO: NEGATIVE
NRBC # BLD AUTO: 0 K/UL
NRBC BLD-RTO: 0 /100 WBC
PH UR STRIP.AUTO: 6 [PH]
PLATELET # BLD AUTO: 225 K/UL (ref 164–446)
PMV BLD AUTO: 9.9 FL (ref 9–12.9)
PROT UR QL STRIP: NEGATIVE MG/DL
RBC # BLD AUTO: 4.52 M/UL (ref 4.2–5.4)
RBC UR QL AUTO: NEGATIVE
RH BLD: NORMAL
RUBV AB SER QL: 132.7 IU/ML
SP GR UR STRIP.AUTO: 1.02
TREPONEMA PALLIDUM IGG+IGM AB [PRESENCE] IN SERUM OR PLASMA BY IMMUNOASSAY: NON REACTIVE
UROBILINOGEN UR STRIP.AUTO-MCNC: 0.2 MG/DL
WBC # BLD AUTO: 10.8 K/UL (ref 4.8–10.8)

## 2019-07-23 PROCEDURE — 87389 HIV-1 AG W/HIV-1&-2 AB AG IA: CPT

## 2019-07-23 PROCEDURE — 86762 RUBELLA ANTIBODY: CPT

## 2019-07-23 PROCEDURE — 81003 URINALYSIS AUTO W/O SCOPE: CPT

## 2019-07-23 PROCEDURE — 99215 OFFICE O/P EST HI 40 MIN: CPT | Performed by: NURSE PRACTITIONER

## 2019-07-23 PROCEDURE — 86780 TREPONEMA PALLIDUM: CPT

## 2019-07-23 PROCEDURE — 86901 BLOOD TYPING SEROLOGIC RH(D): CPT

## 2019-07-23 PROCEDURE — 86850 RBC ANTIBODY SCREEN: CPT

## 2019-07-23 PROCEDURE — 36415 COLL VENOUS BLD VENIPUNCTURE: CPT

## 2019-07-23 PROCEDURE — 86900 BLOOD TYPING SEROLOGIC ABO: CPT

## 2019-07-23 PROCEDURE — 87340 HEPATITIS B SURFACE AG IA: CPT

## 2019-07-23 PROCEDURE — 85025 COMPLETE CBC W/AUTO DIFF WBC: CPT

## 2019-07-23 RX ORDER — HYDROXYZINE HYDROCHLORIDE 25 MG/1
25 TABLET, FILM COATED ORAL 3 TIMES DAILY PRN
Qty: 90 TAB | Refills: 0 | Status: SHIPPED | OUTPATIENT
Start: 2019-07-23 | End: 2019-08-19 | Stop reason: SDUPTHER

## 2019-07-23 RX ORDER — FLUOXETINE 10 MG/1
10 CAPSULE ORAL DAILY
Qty: 30 CAP | Refills: 1 | Status: SHIPPED | OUTPATIENT
Start: 2019-07-23 | End: 2019-08-21 | Stop reason: SDUPTHER
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
## 2019-07-23 NOTE — PATIENT INSTRUCTIONS
Your care was provided today by: WOODY Reynoso    Thank You for the opportunity to serve you.    You may receive a brief survey in the mail or via email shortly regarding your visit today. Please take a few moments to complete the survey and return it. We are working to serve our patient population better, improve customer service and our patients overall experience and your input can help us to accomplish this. We thank you for your help and for the opportunity to serve you today and in the future.     Labs and Diagnostic Testing   Please note that if we have ordered labs or diagnostic testing, those results may be released to you on [employer] prior to my review. While we do our best to review your results as soon as possible, there are times when you may see your results prior to provider review. Of course, if you ever have any questions or concerns about your results, please contact our clinic.     Special Instructions:  Always call 9-1-1 immediately if you develop a life threatening emergency.  You may also contact the National Suicide Prevention Hotline: 4-784-400-CYVH    If you or a friend are looking for resources regarding postpartum depression or anxiety, you may call [employer] at 1-110.542.9348, #1 en Francisco Javier, #2 for English. You may also text 119-475-0155. You may also visit their web site: http://www.postpartum.net/    Unless told otherwise please take all medications as directed and complete prescription therapies. If you ever have concerns or questions, please contact our office.     Watch for the following signs that require additional evaluation: progressive lethargy or unresponsiveness, localized pain (chest, abdomen), shortness of breath, painful breathing, progressive vomiting with weakness, bloody stools, or new rash.

## 2019-08-13 ENCOUNTER — ROUTINE PRENATAL (OUTPATIENT)
Dept: OBGYN | Facility: CLINIC | Age: 39
End: 2019-08-13
Payer: MEDICAID

## 2019-08-13 VITALS — SYSTOLIC BLOOD PRESSURE: 122 MMHG | DIASTOLIC BLOOD PRESSURE: 62 MMHG | BODY MASS INDEX: 61.74 KG/M2 | WEIGHT: 293 LBS

## 2019-08-13 DIAGNOSIS — O09.92 ENCOUNTER FOR SUPERVISION OF HIGH RISK PREGNANCY IN SECOND TRIMESTER, ANTEPARTUM: Primary | ICD-10-CM

## 2019-08-13 PROCEDURE — 90040 PR PRENATAL FOLLOW UP: CPT | Performed by: PHYSICIAN ASSISTANT

## 2019-08-13 NOTE — PROGRESS NOTES
Pt has no complaints with cramping, bleeding or pain. No FM yet, possible flutters only. PNL, GC/CT wnl - pt aware of need for Rhogam at 28wk. AFP slip given today with instructions. Pt notes her anxiety has worsened, as this pregnancy was not desired, and pt has been working hard to manage anxiety and depression over the past 5 years. Pt never had this with other pregnancies, but her last was 16 yrs ago. Pt is currently on Prozac, and will be following up with [facility] and will see counselor today. Pt is keeping pregnancy, has supportive girlfriend here today. Unable to hear FHT, so BSUS done today with single viable IUP seen. VERY difficult to assess due to maternal obesity - FL c/w 14w3d, BPC c/w 14w5d, HC c/w 14w2d. Will plan to change EDDIE at this time. Pt informed of new EDDIE. Will move US back 2 weeks for better visualization of fetal anatomy. Also, AFP slip given today with instructions - to do in 1 wk. RTC 4 wk or sooner prn.

## 2019-08-13 NOTE — PROGRESS NOTES
OB follow up   + fetal movement.  No VB, LOF or UC's.  Wt: 371 lbs         BP: 122/62  Phone #: [phone]   Preferred pharmacy confirmed.

## 2019-08-14 ENCOUNTER — TELEPHONE (OUTPATIENT)
Dept: OBGYN | Facility: CLINIC | Age: 39
End: 2019-08-14

## 2019-08-14 NOTE — TELEPHONE ENCOUNTER
Pt called c/o brown spotting, denies cramps. Pt stated she cleaned out garage yesterday. Per protocol triage, adv pt to rest, hydrate. SAB precautions given to pt and notified her when to go to ER. Pt voiced understanding and will comply. Pt had no other questions or concerns

## 2019-08-19 RX ORDER — HYDROXYZINE HYDROCHLORIDE 25 MG/1
25 TABLET, FILM COATED ORAL 3 TIMES DAILY PRN
Qty: 90 TAB | Refills: 0 | Status: SHIPPED | OUTPATIENT
Start: 2019-08-19 | End: 2019-08-21 | Stop reason: SDUPTHER

## 2019-08-19 NOTE — TELEPHONE ENCOUNTER
Was the patient seen in the last year in this department? No     Does patient have an active prescription for medications requested? Yes    Received Request Via: Pharmacy    Hospital Outpatient Visit on 07/23/2019   Component Date Value   • Color 07/23/2019 Yellow    • Character 07/23/2019 Clear    • Specific Gravity 07/23/2019 1.025    • Ph 07/23/2019 6.0    • Glucose 07/23/2019 Negative    • Ketones 07/23/2019 Negative    • Protein 07/23/2019 Negative    • Bilirubin 07/23/2019 Negative    • Urobilinogen, Urine 07/23/2019 0.2    • Nitrite 07/23/2019 Negative    • Leukocyte Esterase 07/23/2019 Negative    • Occult Blood 07/23/2019 Negative    • Micro Urine Req 07/23/2019 see below    • WBC 07/23/2019 10.8    • RBC 07/23/2019 4.52    • Hemoglobin 07/23/2019 13.1    • Hematocrit 07/23/2019 40.1    • MCV 07/23/2019 88.7    • MCH 07/23/2019 29.0    • MCHC 07/23/2019 32.7*   • RDW 07/23/2019 41.2    • Platelet Count 07/23/2019 225    • MPV 07/23/2019 9.9    • Neutrophils-Polys 07/23/2019 68.90    • Lymphocytes 07/23/2019 23.70    • Monocytes 07/23/2019 5.40    • Eosinophils 07/23/2019 1.20    • Basophils 07/23/2019 0.50    • Immature Granulocytes 07/23/2019 0.30    • Nucleated RBC 07/23/2019 0.00    • Neutrophils (Absolute) 07/23/2019 7.43*   • Lymphs (Absolute) 07/23/2019 2.55    • Monos (Absolute) 07/23/2019 0.58    • Eos (Absolute) 07/23/2019 0.13    • Baso (Absolute) 07/23/2019 0.05    • Immature Granulocytes (a* 07/23/2019 0.03    • NRBC (Absolute) 07/23/2019 0.00    • Hepatitis B Surface Anti* 07/23/2019 Negative    • Rubella IgG Antibody 07/23/2019 132.70    • Syphilis, Treponemal Qual 07/23/2019 Non Reactive    • HIV Ag/Ab Combo Assay 07/23/2019 Non Reactive    • ABO Grouping Only 07/23/2019 A    • Rh Grouping Only 07/23/2019 NEG    • Antibody Screen Scrn 07/23/2019 NEG    Hospital Outpatient Visit on 07/16/2019   Component Date Value   • C. trachomatis by PCR 07/16/2019 Negative    • N. gonorrhoeae by PCR  07/16/2019 Negative    • Source 07/16/2019 Genital    Initial Prenatal on 07/16/2019   Component Date Value   • POC Color 07/16/2019 Yellow    • POC Appearance 07/16/2019 Clear    • POC Leukocyte Esterase 07/16/2019 Negative    • POC Nitrites 07/16/2019 Negative    • POC Protein 07/16/2019 Negative    • POC Urine PH 07/16/2019 7.0    • POC Blood 07/16/2019 Trace    • POC Specific Gravity 07/16/2019 1.020    • POC Ketones 07/16/2019 Negative    • POC Glucose 07/16/2019 Negative    Gynecology Visit on 06/20/2019   Component Date Value   • POC Urine Pregnancy Test 06/20/2019 POSITIVE    • Internal Control Positive 06/20/2019 Positive    • Internal Control Negative 06/20/2019 Negative    ]

## 2019-08-21 ENCOUNTER — HOSPITAL ENCOUNTER (OUTPATIENT)
Dept: LAB | Facility: MEDICAL CENTER | Age: 39
End: 2019-08-21
Attending: PHYSICIAN ASSISTANT
Payer: MEDICAID

## 2019-08-21 ENCOUNTER — GYNECOLOGY VISIT (OUTPATIENT)
Dept: OBGYN | Facility: CLINIC | Age: 39
End: 2019-08-21
Payer: MEDICAID

## 2019-08-21 VITALS — SYSTOLIC BLOOD PRESSURE: 124 MMHG | WEIGHT: 293 LBS | DIASTOLIC BLOOD PRESSURE: 72 MMHG | BODY MASS INDEX: 62.07 KG/M2

## 2019-08-21 DIAGNOSIS — F33.1 MODERATE EPISODE OF RECURRENT MAJOR DEPRESSIVE DISORDER (HCC): ICD-10-CM

## 2019-08-21 DIAGNOSIS — F41.9 ANXIETY DURING PREGNANCY: ICD-10-CM

## 2019-08-21 DIAGNOSIS — O09.92 ENCOUNTER FOR SUPERVISION OF HIGH RISK PREGNANCY IN SECOND TRIMESTER, ANTEPARTUM: ICD-10-CM

## 2019-08-21 DIAGNOSIS — O99.340 ANXIETY DURING PREGNANCY: ICD-10-CM

## 2019-08-21 DIAGNOSIS — Z87.898 HISTORY OF ALCOHOL USE DISORDER: ICD-10-CM

## 2019-08-21 PROCEDURE — 36415 COLL VENOUS BLD VENIPUNCTURE: CPT

## 2019-08-21 PROCEDURE — 99214 OFFICE O/P EST MOD 30 MIN: CPT | Performed by: NURSE PRACTITIONER

## 2019-08-21 PROCEDURE — 81511 FTL CGEN ABNOR FOUR ANAL: CPT

## 2019-08-21 RX ORDER — FLUOXETINE 10 MG/1
10 CAPSULE ORAL DAILY
Qty: 90 CAP | Refills: 1 | Status: SHIPPED | OUTPATIENT
Start: 2019-08-21 | End: 2021-08-25

## 2019-08-21 RX ORDER — HYDROXYZINE HYDROCHLORIDE 25 MG/1
25 TABLET, FILM COATED ORAL 3 TIMES DAILY PRN
Qty: 90 TAB | Refills: 0 | Status: SHIPPED | OUTPATIENT
Start: 2019-08-21 | End: 2020-02-04
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
## 2019-08-21 NOTE — PATIENT INSTRUCTIONS
Your care was provided today by: WOODY Reynoso    Thank You for the opportunity to serve you.    You may receive a brief survey in the mail or via email shortly regarding your visit today. Please take a few moments to complete the survey and return it. We are working to serve our patient population better, improve customer service and our patients overall experience and your input can help us to accomplish this. We thank you for your help and for the opportunity to serve you today and in the future.     Labs and Diagnostic Testing   Please note that if we have ordered labs or diagnostic testing, those results may be released to you on [employer] prior to my review. While we do our best to review your results as soon as possible, there are times when you may see your results prior to provider review. Of course, if you ever have any questions or concerns about your results, please contact our clinic.     Special Instructions:  Always call 9-1-1 immediately if you develop a life threatening emergency.  You may also contact the National Suicide Prevention Hotline: 2-767-640-VFBT    If you or a friend are looking for resources regarding postpartum depression or anxiety, you may call [employer] at 1-840.656.8946, #1 en Francisco Javier, #2 for English. You may also text 864-754-1422. You may also visit their web site: http://www.postpartum.net/    Unless told otherwise please take all medications as directed and complete prescription therapies. If you ever have concerns or questions, please contact our office.     Watch for the following signs that require additional evaluation: progressive lethargy or unresponsiveness, localized pain (chest, abdomen), shortness of breath, painful breathing, progressive vomiting with weakness, bloody stools, or new rash.

## 2019-08-21 NOTE — ASSESSMENT & PLAN NOTE
Patient is currently not working with a therapist, this will be her second appointment.  She reports this is hopefully going to help more with her anxiety which she experiences mostly at bedtime.  She has been utilizing hydroxyzine as needed at bedtime which does help her get at least some sleep although she is often up from 2-5 am in the morning with some racing thoughts.

## 2019-08-21 NOTE — NON-PROVIDER
Patient here for Anxiety   States her medication is helping   # [phone]  Pharmacy Confirmed   Denies SI

## 2019-08-24 LAB
# FETUSES US: ABNORMAL
AFP MOM SERPL: 0.8
AFP SERPL-MCNC: 13 NG/ML
AGE - REPORTED: 38.2 YR
CURRENT SMOKER: NO
FAMILY MEMBER DISEASES HX: NO
GA METHOD: ABNORMAL
GA: ABNORMAL WK
HCG MOM SERPL: 1.66
HCG SERPL-ACNC: ABNORMAL IU/L
HX OF HEREDITARY DISORDERS: NO
IDDM PATIENT QL: NO
INHIBIN A MOM SERPL: 2.37
INHIBIN A SERPL-MCNC: 101 PG/ML
INTEGRATED SCN PATIENT-IMP: ABNORMAL
PATHOLOGY STUDY: ABNORMAL
SPECIMEN DRAWN SERPL: ABNORMAL
U ESTRIOL MOM SERPL: 0.55
U ESTRIOL SERPL-MCNC: 0.37 NG/ML

## 2019-08-26 DIAGNOSIS — O28.0 ABNORMAL ANTENATAL AFP SCREEN: ICD-10-CM

## 2019-09-04 ENCOUNTER — TELEPHONE (OUTPATIENT)
Dept: OBGYN | Facility: CLINIC | Age: 39
End: 2019-09-04

## 2019-09-04 NOTE — TELEPHONE ENCOUNTER
----- Message from RANJAN Freedman sent at 8/26/2019  7:56 AM PDT -----  Pt dated by 14 week US so needs to go see Oki for abnormal AFP with good dates. Referral placed.    Pt notified and instructed when to call Dr. Uribe office.      Has not further questions.

## 2019-09-04 NOTE — PROGRESS NOTES
Referral faxed to Dr. PERSAUD on 9/4/19  They will contact patient to schedule appt.  Please check with patient if appt was made/ document. Thank you

## 2019-09-16 ENCOUNTER — ROUTINE PRENATAL (OUTPATIENT)
Dept: OBGYN | Facility: CLINIC | Age: 39
End: 2019-09-16
Payer: MEDICAID

## 2019-09-16 VITALS — DIASTOLIC BLOOD PRESSURE: 64 MMHG | WEIGHT: 293 LBS | BODY MASS INDEX: 62.24 KG/M2 | SYSTOLIC BLOOD PRESSURE: 120 MMHG

## 2019-09-16 DIAGNOSIS — O28.0 ABNORMAL ANTENATAL AFP SCREEN: ICD-10-CM

## 2019-09-16 DIAGNOSIS — O09.92 ENCOUNTER FOR SUPERVISION OF HIGH RISK PREGNANCY IN SECOND TRIMESTER, ANTEPARTUM: ICD-10-CM

## 2019-09-16 PROCEDURE — 90040 PR PRENATAL FOLLOW UP: CPT | Performed by: PHYSICIAN ASSISTANT

## 2019-09-16 NOTE — PROGRESS NOTES
Pt here today for OB follow up  Reports +FM  WT: 374 lb  BP: 120/64  US on 09/24/19  Pt states no complaints today, although states she is concerned about lab results.  Pt states has appt with Dr. Zaragoza tomorrow 09/17  Oscar # [phone]
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
## 2019-09-20 ENCOUNTER — DOCUMENTATION (OUTPATIENT)
Dept: OBGYN | Facility: CLINIC | Age: 39
End: 2019-09-20

## 2019-09-20 NOTE — PROGRESS NOTES
Genetics consult with Dr. Zaragoza 9/17/19.  Patient opted for amniocentesis with karyotype, fetal array, AFAFP.

## 2019-09-25 ENCOUNTER — DATING (OUTPATIENT)
Dept: OBGYN | Facility: CLINIC | Age: 39
End: 2019-09-25

## 2019-09-25 DIAGNOSIS — O28.0 ABNORMAL ANTENATAL AFP SCREEN: ICD-10-CM

## 2019-10-03 ENCOUNTER — NON-PROVIDER VISIT (OUTPATIENT)
Dept: OBGYN | Facility: CLINIC | Age: 39
End: 2019-10-03
Payer: MEDICAID

## 2019-10-03 DIAGNOSIS — Z67.91 RH NEGATIVE STATE IN ANTEPARTUM PERIOD, SECOND TRIMESTER: ICD-10-CM

## 2019-10-03 DIAGNOSIS — O26.892 RH NEGATIVE STATE IN ANTEPARTUM PERIOD, SECOND TRIMESTER: ICD-10-CM

## 2019-10-03 DIAGNOSIS — Z30.013 ENCOUNTER FOR INITIAL PRESCRIPTION OF INJECTABLE CONTRACEPTIVE: ICD-10-CM

## 2019-10-03 PROCEDURE — 96372 THER/PROPH/DIAG INJ SC/IM: CPT | Performed by: NURSE PRACTITIONER

## 2019-10-14 ENCOUNTER — ROUTINE PRENATAL (OUTPATIENT)
Dept: OBGYN | Facility: CLINIC | Age: 39
End: 2019-10-14
Payer: MEDICAID

## 2019-10-14 VITALS — SYSTOLIC BLOOD PRESSURE: 132 MMHG | WEIGHT: 293 LBS | BODY MASS INDEX: 62.95 KG/M2 | DIASTOLIC BLOOD PRESSURE: 72 MMHG

## 2019-10-14 DIAGNOSIS — O09.92 ENCOUNTER FOR SUPERVISION OF HIGH RISK PREGNANCY IN SECOND TRIMESTER, ANTEPARTUM: ICD-10-CM

## 2019-10-14 PROCEDURE — 90040 PR PRENATAL FOLLOW UP: CPT | Performed by: PHYSICIAN ASSISTANT

## 2019-10-14 NOTE — PROGRESS NOTES
Pt. Here for OB/FU. Reports Good FM.   Good # [phone]  Pt states had an amnio with Dr. Zaragoza on 10/3/19, Pt states has another appt with Dr. Zaragoza on 10/24/19.  Pt states had some nausea and anxiety.   Pharmacy verified.

## 2019-10-14 NOTE — PROGRESS NOTES
"Pt has no complaints with cramping, bleeding or pain, but pt is feeling very nervous and anxious awaiting results of amniocentesis. Pt to have results this week, states the amnio went well but she is just worried until she has the results. Pt has another appt with Dr Zaragoza to f/u growth and get results 10/24. +FM. 1hr GTT to be done next visit. Unable to get fundal height today. US done confirms transverse position and +FHT at 155bpm. Pt encouraged to get flu vaccine as none in clinic currently. Pt also tearful stating that she has \"bad days and good days\" and if she can \"keep it all together\" she does ok, but if one thing bad occurs, she loses it. Pt encouraged to cry and keep talking about these feelings. RTC 4 wk or sooner prn.   "

## 2019-10-21 ENCOUNTER — HOSPITAL ENCOUNTER (OUTPATIENT)
Facility: MEDICAL CENTER | Age: 39
End: 2019-10-21
Attending: OBSTETRICS & GYNECOLOGY | Admitting: OBSTETRICS & GYNECOLOGY
Payer: MEDICAID

## 2019-10-21 ENCOUNTER — TELEPHONE (OUTPATIENT)
Dept: OBGYN | Facility: CLINIC | Age: 39
End: 2019-10-21

## 2019-10-21 VITALS
HEART RATE: 86 BPM | SYSTOLIC BLOOD PRESSURE: 128 MMHG | DIASTOLIC BLOOD PRESSURE: 67 MMHG | WEIGHT: 293 LBS | BODY MASS INDEX: 48.82 KG/M2 | HEIGHT: 65 IN

## 2019-10-21 LAB
ALBUMIN SERPL BCP-MCNC: 3 G/DL (ref 3.2–4.9)
ALBUMIN/GLOB SERPL: 1 G/DL
ALP SERPL-CCNC: 56 U/L (ref 30–99)
ALT SERPL-CCNC: 9 U/L (ref 2–50)
ANION GAP SERPL CALC-SCNC: 9 MMOL/L (ref 0–11.9)
AST SERPL-CCNC: 16 U/L (ref 12–45)
BASOPHILS # BLD AUTO: 0.3 % (ref 0–1.8)
BASOPHILS # BLD: 0.04 K/UL (ref 0–0.12)
BILIRUB SERPL-MCNC: 0.2 MG/DL (ref 0.1–1.5)
BUN SERPL-MCNC: 9 MG/DL (ref 8–22)
CALCIUM SERPL-MCNC: 8.6 MG/DL (ref 8.5–10.5)
CHLORIDE SERPL-SCNC: 107 MMOL/L (ref 96–112)
CO2 SERPL-SCNC: 22 MMOL/L (ref 20–33)
CREAT SERPL-MCNC: 0.36 MG/DL (ref 0.5–1.4)
CREAT UR-MCNC: 151.3 MG/DL
EOSINOPHIL # BLD AUTO: 0.19 K/UL (ref 0–0.51)
EOSINOPHIL NFR BLD: 1.5 % (ref 0–6.9)
ERYTHROCYTE [DISTWIDTH] IN BLOOD BY AUTOMATED COUNT: 40.6 FL (ref 35.9–50)
GLOBULIN SER CALC-MCNC: 3 G/DL (ref 1.9–3.5)
GLUCOSE SERPL-MCNC: 89 MG/DL (ref 65–99)
HCT VFR BLD AUTO: 34.9 % (ref 37–47)
HGB BLD-MCNC: 11.4 G/DL (ref 12–16)
IMM GRANULOCYTES # BLD AUTO: 0.08 K/UL (ref 0–0.11)
IMM GRANULOCYTES NFR BLD AUTO: 0.6 % (ref 0–0.9)
LYMPHOCYTES # BLD AUTO: 2.68 K/UL (ref 1–4.8)
LYMPHOCYTES NFR BLD: 20.5 % (ref 22–41)
MCH RBC QN AUTO: 28.7 PG (ref 27–33)
MCHC RBC AUTO-ENTMCNC: 32.7 G/DL (ref 33.6–35)
MCV RBC AUTO: 87.9 FL (ref 81.4–97.8)
MONOCYTES # BLD AUTO: 0.76 K/UL (ref 0–0.85)
MONOCYTES NFR BLD AUTO: 5.8 % (ref 0–13.4)
NEUTROPHILS # BLD AUTO: 9.3 K/UL (ref 2–7.15)
NEUTROPHILS NFR BLD: 71.3 % (ref 44–72)
NRBC # BLD AUTO: 0 K/UL
NRBC BLD-RTO: 0 /100 WBC
PLATELET # BLD AUTO: 263 K/UL (ref 164–446)
PMV BLD AUTO: 9.4 FL (ref 9–12.9)
POTASSIUM SERPL-SCNC: 3.9 MMOL/L (ref 3.6–5.5)
PROT SERPL-MCNC: 6 G/DL (ref 6–8.2)
PROT UR-MCNC: 15.2 MG/DL (ref 0–15)
PROT/CREAT UR: 100 MG/G (ref 10–107)
RBC # BLD AUTO: 3.97 M/UL (ref 4.2–5.4)
SODIUM SERPL-SCNC: 138 MMOL/L (ref 135–145)
WBC # BLD AUTO: 13.1 K/UL (ref 4.8–10.8)

## 2019-10-21 PROCEDURE — 84156 ASSAY OF PROTEIN URINE: CPT

## 2019-10-21 PROCEDURE — 81001 URINALYSIS AUTO W/SCOPE: CPT

## 2019-10-21 PROCEDURE — 99213 OFFICE O/P EST LOW 20 MIN: CPT | Performed by: FAMILY MEDICINE

## 2019-10-21 PROCEDURE — 80053 COMPREHEN METABOLIC PANEL: CPT

## 2019-10-21 PROCEDURE — 82570 ASSAY OF URINE CREATININE: CPT

## 2019-10-21 PROCEDURE — 36415 COLL VENOUS BLD VENIPUNCTURE: CPT

## 2019-10-21 PROCEDURE — 85025 COMPLETE CBC W/AUTO DIFF WBC: CPT

## 2019-10-21 NOTE — PROGRESS NOTES
Subjective: Is a 38-year-old -0-2-4 at 24 weeks and 1 day who presents today for headaches and vision changes that occurred over the weekend but have since resolved.  Onset of symptoms 2 days ago but resolved 1 day ago.  Patient currently on blood pressure medication, labetalol 200 mg twice daily.  Patient currently taking baby aspirin once daily.  Currently follows with Dr. Zaragoza and [facility].  Patient called this morning to advise [facility] of her symptoms and was told to come to labor and delivery triage.  Currently asymptomatic.    Objective:  Physical Exam   Constitutional: She is oriented to person, place, and time. She appears well-developed and well-nourished.   Obese   HENT:   Head: Normocephalic and atraumatic.   Eyes: Pupils are equal, round, and reactive to light. EOM are normal.   Neck: Normal range of motion. Neck supple.   Cardiovascular: Normal rate and regular rhythm. Exam reveals no gallop and no friction rub.   No murmur heard.  Pulmonary/Chest: Effort normal. No respiratory distress.   Abdominal: She exhibits no distension and no mass. There is no rebound and no guarding. No hernia.   Musculoskeletal: Normal range of motion.   Neurological: She is alert and oriented to person, place, and time.   Skin: Skin is warm and dry.   Psychiatric: She has a normal mood and affect. Her behavior is normal.   Vitals reviewed.    Lab Results   Component Value Date/Time    WBC 13.1 (H) 10/21/2019 03:26 PM    RBC 3.97 (L) 10/21/2019 03:26 PM    HEMOGLOBIN 11.4 (L) 10/21/2019 03:26 PM    HEMATOCRIT 34.9 (L) 10/21/2019 03:26 PM    MCV 87.9 10/21/2019 03:26 PM    MCH 28.7 10/21/2019 03:26 PM    MCHC 32.7 (L) 10/21/2019 03:26 PM    MPV 9.4 10/21/2019 03:26 PM    NEUTSPOLYS 71.30 10/21/2019 03:26 PM    LYMPHOCYTES 20.50 (L) 10/21/2019 03:26 PM    MONOCYTES 5.80 10/21/2019 03:26 PM    EOSINOPHILS 1.50 10/21/2019 03:26 PM    BASOPHILS 0.30 10/21/2019 03:26 PM        Lab Results   Component Value  Date/Time    SODIUM 138 10/21/2019 03:26 PM    POTASSIUM 3.9 10/21/2019 03:26 PM    CHLORIDE 107 10/21/2019 03:26 PM    CO2 22 10/21/2019 03:26 PM    GLUCOSE 89 10/21/2019 03:26 PM    BUN 9 10/21/2019 03:26 PM    CREATININE 0.36 (L) 10/21/2019 03:26 PM      Recent Labs     10/21/19  1526   ASTSGOT 16   ALTSGPT 9   TBILIRUBIN 0.2   ALKPHOSPHAT 56   GLOBULIN 3.0     Urine protein creatinine ratio 15.  Elevated but subthreshold for preeclampsia.    Assessment and plan:  Is a 38-year-old -0-2-4 at 24 weeks and 1 day who presents for resolved symptoms of headache and vision change with a history of chronic hypertension currently on baby aspirin for preeclampsia prophylaxis.  Advised to follow-up with Dr. Zaragoza as regularly scheduled on 10/24/2019.  Discharged home with expedited follow-up.    Please note that this dictation was created using voice recognition software. I have worked with consultants from the vendor as well as technical experts from [employer] to optimize the interface. I have made every reasonable attempt to correct obvious errors, but I expect that there are errors of grammar and possibly content that I did not discover before finalizing the note.

## 2019-10-21 NOTE — TELEPHONE ENCOUNTER
Pt c/o face, hands and feet swollen, very tired, blurred vision, headaches. +FM. Denies other complications.    Consulted w/Maria Meza CNM. Advised to go to L&D.    Pt instructed to go to L&D @ [facility].     Verbalized understanding

## 2019-10-21 NOTE — PROGRESS NOTES
G7 P 4 24.1 weeks gestation pt is here with C/O of being tired, swelling in feet and hands pain in right shoulder and arm and numbness in the right hand.  Pt has had shortness of breath and H/A in the past weekend with black spots in her vision. However today she is not experiencing that only numbness in her hand today. Denies epigastric pain. vey difficult to monitor baby but fetus is moving and mom feel +FM. FHT is 150's, no decels can be heard. Denies any contractions.  1650 Dr Khan reviewed the lab results, blood pressures, reviewed he strip that we could get from baby,he is in the room, spoke to pt and order receivd to discharge pt home.  1700 instructions given and pt was discharged  Home with understanding of precautions and when to return.  1715 pt  Left the room to go home with understanding the  Precautions.

## 2019-10-22 LAB
AMORPH CRY #/AREA URNS HPF: PRESENT /HPF
APPEARANCE UR: ABNORMAL
BACTERIA #/AREA URNS HPF: ABNORMAL /HPF
BILIRUB UR QL STRIP.AUTO: NEGATIVE
CAOX CRY #/AREA URNS HPF: ABNORMAL /HPF
COLOR UR: YELLOW
EPI CELLS #/AREA URNS HPF: NEGATIVE /HPF
GLUCOSE UR STRIP.AUTO-MCNC: NEGATIVE MG/DL
HYALINE CASTS #/AREA URNS LPF: ABNORMAL /LPF
KETONES UR STRIP.AUTO-MCNC: NEGATIVE MG/DL
LEUKOCYTE ESTERASE UR QL STRIP.AUTO: NEGATIVE
MICRO URNS: ABNORMAL
NITRITE UR QL STRIP.AUTO: NEGATIVE
PH UR STRIP.AUTO: 6 [PH] (ref 5–8)
PROT UR QL STRIP: NEGATIVE MG/DL
RBC # URNS HPF: ABNORMAL /HPF
RBC UR QL AUTO: NEGATIVE
SP GR UR STRIP.AUTO: >=1.03
UROBILINOGEN UR STRIP.AUTO-MCNC: 0.2 MG/DL
WBC #/AREA URNS HPF: ABNORMAL /HPF

## 2019-10-22 NOTE — DISCHARGE INSTRUCTIONS
General Instructions:  · If you think you are in labor, time contractions (lying on your left side) from the beginning of one contraction to the beginning of the next contraction for at least one hour.  · Increase fluid intake: you should consume 10-12 8 oz glasses of non-caffeinated fluid per day.  · Report any pressure or burning on urination to your physician.  · Monitor fetal movement: If you notice an absence or decrease in fetal movement, drink a large glass of water and rest on your side.  If there is no increase in movement, call your physician or go to the hospital for further evaluation.  · Report any sudden, sharp abdominal pain.  · Report any bleeding.  Spotting or pinkish discharge is normal after vaginal exam.  You may also spot after sexual intercourse.    Pre-term Labor (<37 weeks):  Call your physician or return to the hospital if:  · You have painless regular contractions more than 4 in one hour.  · Your water breaks (remember time and color).  · You have menstrual-like cramps, a low dull backache or pressure in your pelvis or back.  · Your baby does not move enough to complete the daily kick count (10 movements in 2 hours).  · Your baby moves much less often than on the days before or you have not felt your baby move all day.  · Please review the MEDICATION LIST section of your AFTER VISIT SUMMARY document.  · Take your medication as prescribed      Other Instructions:  Please carefully review your entire AFTER VISIT SUMMARY document for all discharge instructions.General Instructions:  · If you think you are in labor, time contractions (lying on your left side) from the beginning of one contraction to the beginning of the next contraction for at least one hour.  · Increase fluid intake: you should consume 10-12 8 oz glasses of non-caffeinated fluid per day.  · Report any pressure or burning on urination to your physician.  · Monitor fetal movement: If you notice an absence or decrease in fetal  movement, drink a large glass of water and rest on your side.  If there is no increase in movement, call your physician or go to the hospital for further evaluation.  · Report any sudden, sharp abdominal pain.  · Report any bleeding.  Spotting or pinkish discharge is normal after vaginal exam.  You may also spot after sexual intercourse.    High Blood Pressure:  · Rest on your right or left side.  · Report any severe headaches, dizziness, blurred vision or spots before your eyes.  · Report excessive swelling of your hands, face or feet.  · Report epigastric pain (upper abdominal pain)      Other Instructions:  Please carefully review your entire AFTER VISIT SUMMARY document for all discharge instructions.

## 2019-11-05 RX ORDER — LABETALOL 200 MG/1
TABLET, FILM COATED ORAL
Qty: 60 TAB | Refills: 2 | Status: ON HOLD | OUTPATIENT
Start: 2019-11-05 | End: 2020-02-03

## 2019-11-11 ENCOUNTER — HOSPITAL ENCOUNTER (OUTPATIENT)
Dept: LAB | Facility: MEDICAL CENTER | Age: 39
End: 2019-11-11
Attending: PHYSICIAN ASSISTANT
Payer: MEDICAID

## 2019-11-11 ENCOUNTER — ROUTINE PRENATAL (OUTPATIENT)
Dept: OBGYN | Facility: CLINIC | Age: 39
End: 2019-11-11
Payer: MEDICAID

## 2019-11-11 VITALS — BODY MASS INDEX: 63.77 KG/M2 | DIASTOLIC BLOOD PRESSURE: 82 MMHG | SYSTOLIC BLOOD PRESSURE: 128 MMHG | WEIGHT: 293 LBS

## 2019-11-11 DIAGNOSIS — O09.92 ENCOUNTER FOR SUPERVISION OF HIGH RISK PREGNANCY IN SECOND TRIMESTER, ANTEPARTUM: Primary | ICD-10-CM

## 2019-11-11 DIAGNOSIS — O09.92 ENCOUNTER FOR SUPERVISION OF HIGH RISK PREGNANCY IN SECOND TRIMESTER, ANTEPARTUM: ICD-10-CM

## 2019-11-11 LAB
HCT VFR BLD AUTO: 37.1 % (ref 37–47)
HGB BLD-MCNC: 11.9 G/DL (ref 12–16)

## 2019-11-11 PROCEDURE — 85014 HEMATOCRIT: CPT

## 2019-11-11 PROCEDURE — 90715 TDAP VACCINE 7 YRS/> IM: CPT | Performed by: PHYSICIAN ASSISTANT

## 2019-11-11 PROCEDURE — 90472 IMMUNIZATION ADMIN EACH ADD: CPT | Performed by: PHYSICIAN ASSISTANT

## 2019-11-11 PROCEDURE — 90040 PR PRENATAL FOLLOW UP: CPT | Performed by: PHYSICIAN ASSISTANT

## 2019-11-11 PROCEDURE — 86780 TREPONEMA PALLIDUM: CPT

## 2019-11-11 PROCEDURE — 90471 IMMUNIZATION ADMIN: CPT | Performed by: PHYSICIAN ASSISTANT

## 2019-11-11 PROCEDURE — 90686 IIV4 VACC NO PRSV 0.5 ML IM: CPT | Performed by: PHYSICIAN ASSISTANT

## 2019-11-11 PROCEDURE — 82950 GLUCOSE TEST: CPT

## 2019-11-11 PROCEDURE — 85018 HEMOGLOBIN: CPT

## 2019-11-11 PROCEDURE — 36415 COLL VENOUS BLD VENIPUNCTURE: CPT

## 2019-11-11 NOTE — PROGRESS NOTES
Pt. Here for OB/F/U, Kick Count sheet given and explained to pt.   Good FM  Good # [phone]  Pt states went to [facility] on 10/21/19 for headaches and seeing black spots.   Pharmacy verified.   Tdap vaccine given today, left deltoid. Screening checklist reviewed with pt verified by Vanda ALEXANDRE   Flu vaccine given today, right deltoid. Screening checklist reviewed with pt. Verified by Vanda ALEXANDRE   BTL unsure.

## 2019-11-11 NOTE — LETTER
"Count Your Baby's Movements  Another step to a healthy delivery    Sonya Flor             Dept: [phone]    How Many Weeks Pregnant 27w1d    Date to Begin Countin19              How to use this chart    One way for your physician to keep track of your baby's health is by knowing how often the baby moves (or \"kicks\") in your womb.  You can help your physician to do this by using this chart every day.    Every day, you should see how many hours it takes for your baby to move 10 times.  Start in the morning, as soon as you get up.    · First, write down the time your baby moves until you get to 10.  · Check off one box every time your baby moves until you get to 10.  · Write down the time you finished counting in the last column.  · Total how long it took to count up all 10 movements.  · Finally, fill in the box that shows how long this took.  After counting 10 movements, you no longer have to count any more that day.  The next morning, just start counting again as soon as you get up.    What should you call a \"movement\"?  It is hard to say, because it will feel different from one mother to another and from one pregnancy to the next.  The important thing is that you count the movements the same way throughout your pregnancy.  If you have more questions, you should ask your physician.    Count carefully every day!  SAMPLE:  Week 28    How many hours did it take to feel 10 movements?       Start  Time     1     2     3     4     5     6     7     8     9     10   Finish Time   Mon 8:20 ·  ·  ·  ·  ·  ·  ·  ·  ·  ·  11:40                  Sat               Sun                 IMPORTANT: You should contact your physician if it takes more than two hours for you to feel 10 movements.  Each morning, write down the time and start to count the movements of your baby.  Keep track by checking off one box every time you feel one movement.  When you have " "felt 10 \"kicks\", write down the time you finished counting in the last column.  Then fill in the   box (over the check stephanie) for the number of hours it took.  Be sure to read the complete instructions on the previous page.            "

## 2019-11-11 NOTE — PROGRESS NOTES
Pt has no complaints with cramping, bleeding or pain. +FM - FKC sheet given today. 1hr GTT, H/H, RPR slip given today with instructions. Dr Zaragoza US to be done in 2 weeks, f/u growth. Amniocentesis results normal XX - pt very happy and feels much better mood-wise since getting results. Pt has had some days where she feels dizzy and lightheaded, so strongly encouraged to incr water intake, monitor sugar and caffeine intake and eat every 2 hours. Flu and TDaP vaccine given today. Unsure about BTL, so will think about it and let us know. Pt wants repeat C/S and does not want more children, but is very hesitant to permanently sterilize at this time. Will reask next visit. Rhogam given after last visit, will need another before delivery. RTC 4 wk ro sooner prn.

## 2019-11-12 LAB
GLUCOSE 1H P 50 G GLC PO SERPL-MCNC: 135 MG/DL (ref 70–139)
TREPONEMA PALLIDUM IGG+IGM AB [PRESENCE] IN SERUM OR PLASMA BY IMMUNOASSAY: NON REACTIVE

## 2019-12-05 ENCOUNTER — DATING (OUTPATIENT)
Dept: OBGYN | Facility: CLINIC | Age: 39
End: 2019-12-05

## 2019-12-16 ENCOUNTER — ROUTINE PRENATAL (OUTPATIENT)
Dept: OBGYN | Facility: CLINIC | Age: 39
End: 2019-12-16
Payer: MEDICAID

## 2019-12-16 VITALS — BODY MASS INDEX: 65.23 KG/M2 | WEIGHT: 293 LBS | SYSTOLIC BLOOD PRESSURE: 126 MMHG | DIASTOLIC BLOOD PRESSURE: 82 MMHG

## 2019-12-16 DIAGNOSIS — Z91.410 HISTORY OF SEXUAL ABUSE IN ADULTHOOD: ICD-10-CM

## 2019-12-16 DIAGNOSIS — O28.0 ABNORMAL ANTENATAL AFP SCREEN: ICD-10-CM

## 2019-12-16 DIAGNOSIS — Z87.898 HISTORY OF ALCOHOL USE DISORDER: ICD-10-CM

## 2019-12-16 DIAGNOSIS — Z67.91 RH NEGATIVE STATE IN ANTEPARTUM PERIOD: ICD-10-CM

## 2019-12-16 DIAGNOSIS — O26.899 RH NEGATIVE STATE IN ANTEPARTUM PERIOD: ICD-10-CM

## 2019-12-16 DIAGNOSIS — E66.01 MORBID OBESITY WITH BMI OF 60.0-69.9, ADULT (HCC): ICD-10-CM

## 2019-12-16 DIAGNOSIS — F33.1 MODERATE EPISODE OF RECURRENT MAJOR DEPRESSIVE DISORDER (HCC): ICD-10-CM

## 2019-12-16 DIAGNOSIS — Z98.891 HISTORY OF CESAREAN SECTION: ICD-10-CM

## 2019-12-16 DIAGNOSIS — I10 ESSENTIAL HYPERTENSION: ICD-10-CM

## 2019-12-16 PROBLEM — O09.93 HIGH-RISK PREGNANCY SUPERVISION, THIRD TRIMESTER: Status: ACTIVE | Noted: 2019-07-16

## 2019-12-16 PROCEDURE — 90040 PR PRENATAL FOLLOW UP: CPT | Performed by: OBSTETRICS & GYNECOLOGY
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
## 2019-12-16 NOTE — PROGRESS NOTES
OB follow up   + fetal movement.  No VB, LOF or UC's.  Phone # [phone]  Preferred pharmacy confirmed.  BTL declined  Dr Zaragoza appoz scheduled for 12/26/19

## 2020-01-03 ENCOUNTER — ROUTINE PRENATAL (OUTPATIENT)
Dept: OBGYN | Facility: CLINIC | Age: 40
End: 2020-01-03

## 2020-01-03 ENCOUNTER — ROUTINE PRENATAL (OUTPATIENT)
Dept: OBGYN | Facility: CLINIC | Age: 40
End: 2020-01-03
Payer: MEDICAID

## 2020-01-03 VITALS — DIASTOLIC BLOOD PRESSURE: 86 MMHG | SYSTOLIC BLOOD PRESSURE: 148 MMHG

## 2020-01-03 VITALS — SYSTOLIC BLOOD PRESSURE: 150 MMHG | WEIGHT: 293 LBS | DIASTOLIC BLOOD PRESSURE: 84 MMHG | BODY MASS INDEX: 65.9 KG/M2

## 2020-01-03 DIAGNOSIS — O09.93 HIGH-RISK PREGNANCY SUPERVISION, THIRD TRIMESTER: ICD-10-CM

## 2020-01-03 DIAGNOSIS — O16.3 ELEVATED BLOOD PRESSURE AFFECTING PREGNANCY IN THIRD TRIMESTER, ANTEPARTUM: ICD-10-CM

## 2020-01-03 DIAGNOSIS — Z98.891 HISTORY OF CESAREAN SECTION: ICD-10-CM

## 2020-01-03 LAB
APPEARANCE UR: NORMAL
BILIRUB UR STRIP-MCNC: NORMAL MG/DL
COLOR UR AUTO: NORMAL
GLUCOSE UR STRIP.AUTO-MCNC: NORMAL MG/DL
KETONES UR STRIP.AUTO-MCNC: NORMAL MG/DL
LEUKOCYTE ESTERASE UR QL STRIP.AUTO: NORMAL
NITRITE UR QL STRIP.AUTO: NORMAL
NST ACOUSTIC STIMULATION: NORMAL
NST ACTION NECESSARY: NORMAL
NST ASSESSMENT: NORMAL
NST BASELINE: NORMAL
NST INDICATIONS: NORMAL
NST OTHER DATA: NORMAL
NST READ BY: NORMAL
NST RETURN: NORMAL
NST UTERINE ACTIVITY: NORMAL
PH UR STRIP.AUTO: 5.5 [PH] (ref 5–8)
PROT UR QL STRIP: 30 MG/DL
RBC UR QL AUTO: NORMAL
SP GR UR STRIP.AUTO: 1.03
UROBILINOGEN UR STRIP-MCNC: NORMAL MG/DL

## 2020-01-03 PROCEDURE — 81002 URINALYSIS NONAUTO W/O SCOPE: CPT | Performed by: PHYSICIAN ASSISTANT

## 2020-01-03 PROCEDURE — 90040 PR PRENATAL FOLLOW UP: CPT | Performed by: PHYSICIAN ASSISTANT

## 2020-01-03 PROCEDURE — 59025 FETAL NON-STRESS TEST: CPT | Performed by: PHYSICIAN ASSISTANT

## 2020-01-03 NOTE — PROGRESS NOTES
Pt has no complaints with cramping, UCs, Vb, LOF. +FM. GBS next visit. BP elevated today, pt has been having headaches. UA: tr protein, sm blood, sp grav >1.030, otherwise wnl. Pt informed of results and repeat BP still elevated, so will do NST with serial BPs. Pt urged to incr water intake. Pt denies blurred vision or edema. US confirms vtx position today, +cadriac activity at 133bpm. Per pt, had last US with Dr Zaragoza Monday, no f/u, though baby was breech then. Referral sent for repeat C/S today, no BTL. RTC 1 wk or sooner prn. To NST now for serial BPs.     Pt also needs Rhogam shot next visit, as she got in early pregnancy during amnio, so will need another next visit, 14 wk after first shot.

## 2020-01-03 NOTE — PROGRESS NOTES
Pt here today for OB follow up  Pt States having numbness in hands that's painful.   Reports +FM  Good # [phone]  Pharmacy Confirmed.  Chaperone offered and declined.

## 2020-01-08 ENCOUNTER — ROUTINE PRENATAL (OUTPATIENT)
Dept: OBGYN | Facility: CLINIC | Age: 40
End: 2020-01-08
Payer: MEDICAID

## 2020-01-08 VITALS — DIASTOLIC BLOOD PRESSURE: 80 MMHG | SYSTOLIC BLOOD PRESSURE: 126 MMHG

## 2020-01-08 VITALS — BODY MASS INDEX: 66.56 KG/M2 | DIASTOLIC BLOOD PRESSURE: 78 MMHG | WEIGHT: 293 LBS | SYSTOLIC BLOOD PRESSURE: 136 MMHG

## 2020-01-08 DIAGNOSIS — Z67.91 RH NEGATIVE STATE IN ANTEPARTUM PERIOD: ICD-10-CM

## 2020-01-08 DIAGNOSIS — I10 ESSENTIAL HYPERTENSION: Primary | ICD-10-CM

## 2020-01-08 DIAGNOSIS — O26.899 RH NEGATIVE STATE IN ANTEPARTUM PERIOD: ICD-10-CM

## 2020-01-08 DIAGNOSIS — O09.93 HIGH-RISK PREGNANCY SUPERVISION, THIRD TRIMESTER: Primary | ICD-10-CM

## 2020-01-08 DIAGNOSIS — I10 ESSENTIAL HYPERTENSION: ICD-10-CM

## 2020-01-08 DIAGNOSIS — Z98.891 HISTORY OF CESAREAN SECTION: ICD-10-CM

## 2020-01-08 LAB
NST ACOUSTIC STIMULATION: NORMAL
NST ACTION NECESSARY: NORMAL
NST ASSESSMENT: NORMAL
NST BASELINE: 125
NST INDICATIONS: NORMAL
NST OTHER DATA: NORMAL
NST READ BY: NORMAL
NST RETURN: NORMAL
NST UTERINE ACTIVITY: NORMAL

## 2020-01-08 PROCEDURE — 90040 PR PRENATAL FOLLOW UP: CPT | Performed by: NURSE PRACTITIONER

## 2020-01-08 PROCEDURE — 59025 FETAL NON-STRESS TEST: CPT | Performed by: NURSE PRACTITIONER

## 2020-01-08 NOTE — PROGRESS NOTES
Pt here today for OB follow up  Reports +FM  WT: 400 lb  BP: 136/78  Pt states no complaints or concerns today  Good # [phone]     RhoGAM injection administered today 2020. Consent signed.   NDC: 13436-239-77  LOT#: [id number]  Expiration Date: 11/15/2021  Dose: 1500 IU  Site: Left Upper Outer Quadrant Gluteal  Patient educated on use and side effects of medication. Name and  verified prior to injection. Pt tolerated? Yes   Verified by Angela MELCHOR)  Administered by Angela Lawrence at 11:53 AM.  Patient Provided Medication: Yes
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
## 2020-01-08 NOTE — PATIENT INSTRUCTIONS
P:  1.  GBS @ 36 wks.          2.  Continue FKCs.          3.  Questions answered.          4.  Encouraged pt to tour L&D.          5.  Encourage adequate water intake.        6.  F/u 1 wks.        7.  PIH labs ordered.

## 2020-01-10 ENCOUNTER — HOSPITAL ENCOUNTER (OUTPATIENT)
Dept: LAB | Facility: MEDICAL CENTER | Age: 40
End: 2020-01-10
Attending: NURSE PRACTITIONER
Payer: MEDICAID

## 2020-01-10 DIAGNOSIS — O09.93 HIGH-RISK PREGNANCY SUPERVISION, THIRD TRIMESTER: ICD-10-CM

## 2020-01-10 DIAGNOSIS — I10 ESSENTIAL HYPERTENSION: ICD-10-CM

## 2020-01-10 LAB
ALBUMIN SERPL BCP-MCNC: 3.2 G/DL (ref 3.2–4.9)
ALBUMIN/GLOB SERPL: 1.3 G/DL
ALP SERPL-CCNC: 97 U/L (ref 30–99)
ALT SERPL-CCNC: 16 U/L (ref 2–50)
ANION GAP SERPL CALC-SCNC: 10 MMOL/L (ref 0–11.9)
AST SERPL-CCNC: 14 U/L (ref 12–45)
BASOPHILS # BLD AUTO: 0.7 % (ref 0–1.8)
BASOPHILS # BLD: 0.08 K/UL (ref 0–0.12)
BILIRUB SERPL-MCNC: 0.3 MG/DL (ref 0.1–1.5)
BUN SERPL-MCNC: 8 MG/DL (ref 8–22)
CALCIUM SERPL-MCNC: 8.1 MG/DL (ref 8.5–10.5)
CHLORIDE SERPL-SCNC: 105 MMOL/L (ref 96–112)
CO2 SERPL-SCNC: 21 MMOL/L (ref 20–33)
COLLECT DURATION TIME UR: 24 HR
CREAT CL/1.73 SQ M ?TM UR+SERPL-ARVRAT: 136 ML/MIN (ref 88–128)
CREAT SERPL-MCNC: 0.53 MG/DL (ref 0.5–1.4)
CREAT SERPL-MCNC: 0.54 MG/DL (ref 0.5–1.4)
CREAT UR-MCNC: 101.4 MG/DL
CREAT UR-MCNC: 182.7 MG/DL
EOSINOPHIL # BLD AUTO: 0.12 K/UL (ref 0–0.51)
EOSINOPHIL NFR BLD: 1 % (ref 0–6.9)
ERYTHROCYTE [DISTWIDTH] IN BLOOD BY AUTOMATED COUNT: 39.6 FL (ref 35.9–50)
GLOBULIN SER CALC-MCNC: 2.5 G/DL (ref 1.9–3.5)
GLUCOSE SERPL-MCNC: 141 MG/DL (ref 65–99)
HCT VFR BLD AUTO: 36.9 % (ref 37–47)
HGB BLD-MCNC: 11.6 G/DL (ref 12–16)
IMM GRANULOCYTES # BLD AUTO: 0.07 K/UL (ref 0–0.11)
IMM GRANULOCYTES NFR BLD AUTO: 0.6 % (ref 0–0.9)
LYMPHOCYTES # BLD AUTO: 2.04 K/UL (ref 1–4.8)
LYMPHOCYTES NFR BLD: 16.7 % (ref 22–41)
MCH RBC QN AUTO: 26.8 PG (ref 27–33)
MCHC RBC AUTO-ENTMCNC: 31.4 G/DL (ref 33.6–35)
MCV RBC AUTO: 85.2 FL (ref 81.4–97.8)
MONOCYTES # BLD AUTO: 0.48 K/UL (ref 0–0.85)
MONOCYTES NFR BLD AUTO: 3.9 % (ref 0–13.4)
NEUTROPHILS # BLD AUTO: 9.45 K/UL (ref 2–7.15)
NEUTROPHILS NFR BLD: 77.1 % (ref 44–72)
NRBC # BLD AUTO: 0 K/UL
NRBC BLD-RTO: 0 /100 WBC
PLATELET # BLD AUTO: 276 K/UL (ref 164–446)
PMV BLD AUTO: 9.6 FL (ref 9–12.9)
POTASSIUM SERPL-SCNC: 3.8 MMOL/L (ref 3.6–5.5)
PROT 24H UR-MCNC: 216 MG/24 HR (ref 30–150)
PROT 24H UR-MRATE: 13.5 MG/DL (ref 0–15)
PROT SERPL-MCNC: 5.7 G/DL (ref 6–8.2)
PROT UR-MCNC: 21.3 MG/DL (ref 0–15)
PROT/CREAT UR: 117 MG/G (ref 10–107)
RBC # BLD AUTO: 4.33 M/UL (ref 4.2–5.4)
SODIUM SERPL-SCNC: 136 MMOL/L (ref 135–145)
SPECIMEN VOL UR: 1600 ML
SPECIMEN VOL UR: 1600 ML
URATE SERPL-MCNC: 4.6 MG/DL (ref 1.9–8.2)
URINE CREATININE EXCRETED 1125: 1622 MG/24 HR
WBC # BLD AUTO: 12.2 K/UL (ref 4.8–10.8)

## 2020-01-10 PROCEDURE — 85025 COMPLETE CBC W/AUTO DIFF WBC: CPT

## 2020-01-10 PROCEDURE — 36415 COLL VENOUS BLD VENIPUNCTURE: CPT

## 2020-01-10 PROCEDURE — 80053 COMPREHEN METABOLIC PANEL: CPT

## 2020-01-10 PROCEDURE — 81050 URINALYSIS VOLUME MEASURE: CPT

## 2020-01-10 PROCEDURE — 84550 ASSAY OF BLOOD/URIC ACID: CPT

## 2020-01-10 PROCEDURE — 82575 CREATININE CLEARANCE TEST: CPT

## 2020-01-10 PROCEDURE — 84156 ASSAY OF PROTEIN URINE: CPT

## 2020-01-13 ENCOUNTER — TELEPHONE (OUTPATIENT)
Dept: OBGYN | Facility: CLINIC | Age: 40
End: 2020-01-13

## 2020-01-13 DIAGNOSIS — O09.93 HIGH-RISK PREGNANCY SUPERVISION, THIRD TRIMESTER: Primary | ICD-10-CM

## 2020-01-13 NOTE — TELEPHONE ENCOUNTER
01/13/20 9:15 AM  Pt notified and will go today to get labs done      ----- Message from RANJAN Palomares sent at 1/13/2020  8:34 AM PST -----  PIH labs WNL; however glucose is elevated.  Will order a HgA1c. Please have pt complete and insure she has a f/u appt scheduled this week.

## 2020-01-14 ENCOUNTER — HOSPITAL ENCOUNTER (OUTPATIENT)
Dept: LAB | Facility: MEDICAL CENTER | Age: 40
End: 2020-01-14
Attending: NURSE PRACTITIONER
Payer: MEDICAID

## 2020-01-14 DIAGNOSIS — O09.93 HIGH-RISK PREGNANCY SUPERVISION, THIRD TRIMESTER: ICD-10-CM

## 2020-01-14 LAB
EST. AVERAGE GLUCOSE BLD GHB EST-MCNC: 111 MG/DL
HBA1C MFR BLD: 5.5 % (ref 0–5.6)

## 2020-01-14 PROCEDURE — 83036 HEMOGLOBIN GLYCOSYLATED A1C: CPT

## 2020-01-14 PROCEDURE — 36415 COLL VENOUS BLD VENIPUNCTURE: CPT

## 2020-01-15 ENCOUNTER — ROUTINE PRENATAL (OUTPATIENT)
Dept: OBGYN | Facility: CLINIC | Age: 40
End: 2020-01-15
Payer: MEDICAID

## 2020-01-15 ENCOUNTER — HOSPITAL ENCOUNTER (OUTPATIENT)
Facility: MEDICAL CENTER | Age: 40
End: 2020-01-15
Attending: NURSE PRACTITIONER
Payer: MEDICAID

## 2020-01-15 VITALS — BODY MASS INDEX: 66.4 KG/M2 | DIASTOLIC BLOOD PRESSURE: 86 MMHG | SYSTOLIC BLOOD PRESSURE: 126 MMHG | WEIGHT: 293 LBS

## 2020-01-15 DIAGNOSIS — O09.93 HIGH-RISK PREGNANCY SUPERVISION, THIRD TRIMESTER: ICD-10-CM

## 2020-01-15 DIAGNOSIS — Z98.891 HISTORY OF CESAREAN SECTION: ICD-10-CM

## 2020-01-15 DIAGNOSIS — O09.93 HIGH-RISK PREGNANCY SUPERVISION, THIRD TRIMESTER: Primary | ICD-10-CM

## 2020-01-15 PROCEDURE — 87150 DNA/RNA AMPLIFIED PROBE: CPT

## 2020-01-15 PROCEDURE — 90040 PR PRENATAL FOLLOW UP: CPT | Performed by: NURSE PRACTITIONER

## 2020-01-15 PROCEDURE — 87081 CULTURE SCREEN ONLY: CPT
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
## 2020-01-15 NOTE — PROGRESS NOTES
OB follow up   + fetal movement.  No VB, LOF or UC's.  Wt: 399lb      BP: 126/86  Phone # [phone]  Preferred pharmacy confirmed.  GBS today  C Section date still in process

## 2020-01-15 NOTE — PATIENT INSTRUCTIONS
P:  1.  GBS obtained.          2.  Labor precautions given.  Instructions given on where to go.  Pt receptive to              education.          3.  Questions answered.          4.  Continue FKCs.          5.  Encouraged adequate water intake        6.  F/u 1 wk.        7.  Lab results reviewed w pt.

## 2020-01-16 LAB — GP B STREP DNA SPEC QL NAA+PROBE: NEGATIVE

## 2020-01-23 ENCOUNTER — ROUTINE PRENATAL (OUTPATIENT)
Dept: OBGYN | Facility: CLINIC | Age: 40
End: 2020-01-23
Payer: MEDICAID

## 2020-01-23 VITALS — WEIGHT: 293 LBS | BODY MASS INDEX: 67.36 KG/M2 | DIASTOLIC BLOOD PRESSURE: 90 MMHG | SYSTOLIC BLOOD PRESSURE: 142 MMHG

## 2020-01-23 DIAGNOSIS — O09.93 HIGH-RISK PREGNANCY SUPERVISION, THIRD TRIMESTER: ICD-10-CM

## 2020-01-23 DIAGNOSIS — I10 ESSENTIAL HYPERTENSION: ICD-10-CM

## 2020-01-23 LAB
APPEARANCE UR: NORMAL
BILIRUB UR STRIP-MCNC: NORMAL MG/DL
COLOR UR AUTO: NORMAL
GLUCOSE UR STRIP.AUTO-MCNC: NEGATIVE MG/DL
KETONES UR STRIP.AUTO-MCNC: NORMAL MG/DL
LEUKOCYTE ESTERASE UR QL STRIP.AUTO: NEGATIVE
NITRITE UR QL STRIP.AUTO: NEGATIVE
PH UR STRIP.AUTO: 6 [PH] (ref 5–8)
PROT UR QL STRIP: NORMAL MG/DL
RBC UR QL AUTO: NORMAL
SP GR UR STRIP.AUTO: >=1.3
UROBILINOGEN UR STRIP-MCNC: NORMAL MG/DL

## 2020-01-23 PROCEDURE — 90040 PR PRENATAL FOLLOW UP: CPT | Performed by: PHYSICIAN ASSISTANT

## 2020-01-23 PROCEDURE — 81002 URINALYSIS NONAUTO W/O SCOPE: CPT | Performed by: PHYSICIAN ASSISTANT

## 2020-01-23 NOTE — PROGRESS NOTES
OB follow up   + fetal movement.  No VB, LOF or UC's.  Wt:  404.8     BP:154/88  Phone #  [phone]  Preferred pharmacy confirmed.  GBS Negative  CS is scheduled on 2/3/2020 at 2pm. Pre-op 1/31/2020 and CS check on 2/18/2020. Patient given instructions today

## 2020-01-24 ENCOUNTER — DATING (OUTPATIENT)
Dept: OBGYN | Facility: CLINIC | Age: 40
End: 2020-01-24

## 2020-01-24 NOTE — PROGRESS NOTES
SUBJECTIVE:   Brissa Santana is a 40 year old female who presents to clinic today for the following   health issues:      Depression and Anxiety Follow-Up    Status since last visit: No change    Other associated symptoms:None    Complicating factors:     Significant life event: No     Current substance abuse: None    Effexor working deonna. When sober     Has been on effexor for long time     Sober for last 12 days - meth/heroin- opiates and heroin drug of choice/ mostly smoked it  Has used IV meth.  No recent labs for IVDA  Was on Tramadol for long time  Missed lots of appt - NS many appt.   ACTUALLY got my letters terminating her and son as pt for so many NS.  Pt is very apologetic for her behaviors     Lost licence due to DWI    Has been having still mild WD sx.   Some cravings to use again  Has several children- trying to straighten herself out for them    PHQ 12/13/2016 3/5/2018 5/9/2019   PHQ-9 Total Score 5 7 9   Q9: Thoughts of better off dead/self-harm past 2 weeks Not at all Several days Not at all   F/U: Thoughts of suicide or self-harm - No -   F/U: Safety concerns - No -     FRANCIA-7 SCORE 12/13/2016 3/5/2018 5/9/2019   Total Score 10 6 6       PHQ-9  English  PHQ-9   Any Language  FRANCIA-7  Suicide Assessment Five-step Evaluation and Treatment (SAFE-T)    Amount of exercise or physical activity: None    Problems taking medications regularly: No    Medication side effects: none    Diet: regular (no restrictions)            Additional history: as documented    Reviewed  and updated as needed this visit by clinical staff  Tobacco  Allergies  Meds  Med Hx  Surg Hx  Fam Hx  Soc Hx        Reviewed and updated as needed this visit by Provider         Labs reviewed in EPIC    ROS:  CONSTITUTIONAL: NEGATIVE for fever, chills, change in weight  ENT/MOUTH: NEGATIVE for ear, mouth and throat problems  RESP: NEGATIVE for significant cough or SOB  CV: NEGATIVE for chest pain, palpitations or peripheral edema  ROS  Pt has no complaints with regular, strong UCs, Vb, LOF, though pt has had incr cramping and pressure. +FM. /88, repeat 142/90. Pt taking Labetalol 200mg BID. Pt denies new HA, blurred vision or edema. UA: tr protein only, mod blood, Sp grav > 1.030. Pt has preop 1/31 and C/S on 2/3 - pt given info today and d/w pt plan. BP stable, pt to monitor for signs of preeclampsia closely, will RTC if any present. Pt urged to f/u next week for NST, BP check. Daily FKC and walks recommended. US done today confirms vtx position and FHT at 139bpm. RTC 1 wk for preop or sooner prn.    "otherwise negative    OBJECTIVE:                                                    /70   Pulse 103   Temp 98.8  F (37.1  C)   Ht 1.6 m (5' 3\")   Wt 62.1 kg (137 lb)   SpO2 98%   BMI 24.27 kg/m    Body mass index is 24.27 kg/m .   GENERAL: healthy, alert, well nourished, well hydrated, no distress  PSYCH: Alert and oriented times 3; speech- coherent , normal rate and volume; able to articulate logical thoughts, able to abstract reason, no tangential thoughts, no hallucinations or delusions, affect- sad/shamefull of her behaviors and h/o use. Proud she is sober now--?? For how long though          ASSESSMENT/PLAN:                                                    (F19.20) Chemical dependency (H)  (primary encounter diagnosis)  Comment: long discussion - I do feel for her. She is trying.  Discussed with JUAN M Robert if candidate for suboxone.  She is. JUAN M Robert seen her today and will get her evaluation for the program - appt tomorrow. Pt is very happy of the opportunity   Plan: Hepatitis C antibody, Hepatitis B core         antibody, Hepatitis B surface antigen,         Comprehensive metabolic panel, CBC with         platelets differential, HIV Antigen Antibody         Combo, Urine Drugs of Abuse Screen (Tox13),         Hepatitis A Antibody IgG, Bilirubin direct,         naloxone (NARCAN) 4 MG/0.1ML nasal spray,         CANCELED: Hepatic panel (Albumin, ALT, AST,         Bili, Alk Phos, TP)            (F33.3) MDD (major depressive disorder), recurrent, severe, with psychosis (H)  Comment: stable - deonna. When sober   Plan: venlafaxine (EFFEXOR-XR) 150 MG 24 hr capsule        Continue current medications and behavioral changes.     (F41.1) FRANCIA (generalized anxiety disorder)  Comment: stable deonna. When sober   Plan: venlafaxine (EFFEXOR-XR) 150 MG 24 hr capsule     Will  See her back in 2-3 months.        (F11.20) Uncomplicated opioid dependence (H)  Comment: as above.   Plan: labs done.      LONG discussion on " her compliance and no shows-- my letter of termination.  Pt agreed to do better. Will give her a second chance and I will revoke her patient doctor  termination .      45  minutes was spent with patient and over 50%  of this time was spent on counseling patient regarding  illness, medication and / or treatment  options, coordinating further cares and follow ups that are needed along with resource material that will be helpful in the treatment of these issues.       See Patient Instructions    Abhilash Philip MD  [facility]

## 2020-01-31 ENCOUNTER — ANESTHESIA EVENT (OUTPATIENT)
Dept: OBGYN | Facility: MEDICAL CENTER | Age: 40
End: 2020-01-31
Payer: MEDICAID

## 2020-01-31 ENCOUNTER — GYNECOLOGY VISIT (OUTPATIENT)
Dept: OBGYN | Facility: CLINIC | Age: 40
End: 2020-01-31
Payer: MEDICAID

## 2020-01-31 ENCOUNTER — HOSPITAL ENCOUNTER (INPATIENT)
Facility: MEDICAL CENTER | Age: 40
LOS: 3 days | End: 2020-02-03
Attending: OBSTETRICS & GYNECOLOGY | Admitting: OBSTETRICS & GYNECOLOGY
Payer: MEDICAID

## 2020-01-31 VITALS — WEIGHT: 293 LBS | SYSTOLIC BLOOD PRESSURE: 162 MMHG | DIASTOLIC BLOOD PRESSURE: 88 MMHG | BODY MASS INDEX: 67.4 KG/M2

## 2020-01-31 DIAGNOSIS — Z98.891 HISTORY OF CESAREAN SECTION: ICD-10-CM

## 2020-01-31 DIAGNOSIS — O10.919 CHRONIC HYPERTENSION AFFECTING PREGNANCY: ICD-10-CM

## 2020-01-31 DIAGNOSIS — E66.01 MORBID OBESITY WITH BMI OF 60.0-69.9, ADULT (HCC): ICD-10-CM

## 2020-01-31 DIAGNOSIS — O09.93 HIGH-RISK PREGNANCY SUPERVISION, THIRD TRIMESTER: ICD-10-CM

## 2020-01-31 DIAGNOSIS — I10 ESSENTIAL HYPERTENSION: ICD-10-CM

## 2020-01-31 LAB
ABO GROUP BLD: ABNORMAL
ALBUMIN SERPL BCP-MCNC: 3.1 G/DL (ref 3.2–4.9)
ALBUMIN/GLOB SERPL: 0.9 G/DL
ALP SERPL-CCNC: 105 U/L (ref 30–99)
ALT SERPL-CCNC: 19 U/L (ref 2–50)
ANION GAP SERPL CALC-SCNC: 8 MMOL/L (ref 0–11.9)
APPEARANCE UR: CLEAR
AST SERPL-CCNC: 13 U/L (ref 12–45)
BARCODED ABORH UBTYP: 600
BARCODED ABORH UBTYP: 600
BARCODED PRD CODE UBPRD: ABNORMAL
BARCODED PRD CODE UBPRD: ABNORMAL
BARCODED UNIT NUM UBUNT: ABNORMAL
BARCODED UNIT NUM UBUNT: ABNORMAL
BASOPHILS # BLD AUTO: 0.4 % (ref 0–1.8)
BASOPHILS # BLD: 0.06 K/UL (ref 0–0.12)
BILIRUB SERPL-MCNC: 0.3 MG/DL (ref 0.1–1.5)
BLD GP AB SCN SERPL QL: ABNORMAL
BUN SERPL-MCNC: 13 MG/DL (ref 8–22)
CALCIUM SERPL-MCNC: 9.3 MG/DL (ref 8.5–10.5)
CHLORIDE SERPL-SCNC: 107 MMOL/L (ref 96–112)
CO2 SERPL-SCNC: 21 MMOL/L (ref 20–33)
COLOR UR AUTO: ABNORMAL
COMPONENT R 8504R: ABNORMAL
COMPONENT R 8504R: ABNORMAL
CREAT SERPL-MCNC: 0.5 MG/DL (ref 0.5–1.4)
CREAT UR-MCNC: 140 MG/DL
CRYSTALS AMN MICRO: NORMAL
EOSINOPHIL # BLD AUTO: 0.17 K/UL (ref 0–0.51)
EOSINOPHIL NFR BLD: 1.2 % (ref 0–6.9)
ERYTHROCYTE [DISTWIDTH] IN BLOOD BY AUTOMATED COUNT: 40.9 FL (ref 35.9–50)
GLOBULIN SER CALC-MCNC: 3.3 G/DL (ref 1.9–3.5)
GLUCOSE SERPL-MCNC: 83 MG/DL (ref 65–99)
GLUCOSE UR QL STRIP.AUTO: NEGATIVE MG/DL
HCT VFR BLD AUTO: 36.6 % (ref 37–47)
HGB BLD-MCNC: 11.8 G/DL (ref 12–16)
IMM GRANULOCYTES # BLD AUTO: 0.09 K/UL (ref 0–0.11)
IMM GRANULOCYTES NFR BLD AUTO: 0.6 % (ref 0–0.9)
KETONES UR QL STRIP.AUTO: NEGATIVE MG/DL
LEUKOCYTE ESTERASE UR QL STRIP.AUTO: NEGATIVE
LYMPHOCYTES # BLD AUTO: 2.72 K/UL (ref 1–4.8)
LYMPHOCYTES NFR BLD: 18.7 % (ref 22–41)
MCH RBC QN AUTO: 27.1 PG (ref 27–33)
MCHC RBC AUTO-ENTMCNC: 32.2 G/DL (ref 33.6–35)
MCV RBC AUTO: 83.9 FL (ref 81.4–97.8)
MONOCYTES # BLD AUTO: 0.66 K/UL (ref 0–0.85)
MONOCYTES NFR BLD AUTO: 4.5 % (ref 0–13.4)
NEUTROPHILS # BLD AUTO: 10.86 K/UL (ref 2–7.15)
NEUTROPHILS NFR BLD: 74.6 % (ref 44–72)
NITRITE UR QL STRIP.AUTO: POSITIVE
NRBC # BLD AUTO: 0 K/UL
NRBC BLD-RTO: 0 /100 WBC
PH UR STRIP.AUTO: 5.5 [PH] (ref 5–8)
PLATELET # BLD AUTO: 273 K/UL (ref 164–446)
PMV BLD AUTO: 9.2 FL (ref 9–12.9)
POTASSIUM SERPL-SCNC: 3.8 MMOL/L (ref 3.6–5.5)
PRODUCT TYPE UPROD: ABNORMAL
PRODUCT TYPE UPROD: ABNORMAL
PROT SERPL-MCNC: 6.4 G/DL (ref 6–8.2)
PROT UR QL STRIP: 30 MG/DL
PROT UR-MCNC: 29.3 MG/DL (ref 0–15)
PROT/CREAT UR: 209 MG/G (ref 10–107)
RBC # BLD AUTO: 4.36 M/UL (ref 4.2–5.4)
RBC UR QL AUTO: ABNORMAL
RH BLD: ABNORMAL
SODIUM SERPL-SCNC: 136 MMOL/L (ref 135–145)
SP GR UR: 1.02 (ref 1–1.03)
UNIT STATUS USTAT: ABNORMAL
UNIT STATUS USTAT: ABNORMAL
URATE SERPL-MCNC: 5.1 MG/DL (ref 1.9–8.2)
WBC # BLD AUTO: 14.6 K/UL (ref 4.8–10.8)

## 2020-01-31 PROCEDURE — 770002 HCHG ROOM/CARE - OB PRIVATE (112)

## 2020-01-31 PROCEDURE — 59514 CESAREAN DELIVERY ONLY: CPT | Mod: 80

## 2020-01-31 PROCEDURE — 304964 HCHG RECOVERY ROOM TIME 1HR: Performed by: OBSTETRICS & GYNECOLOGY

## 2020-01-31 PROCEDURE — 700102 HCHG RX REV CODE 250 W/ 637 OVERRIDE(OP): Performed by: ANESTHESIOLOGY

## 2020-01-31 PROCEDURE — 700111 HCHG RX REV CODE 636 W/ 250 OVERRIDE (IP): Performed by: ANESTHESIOLOGY

## 2020-01-31 PROCEDURE — A9270 NON-COVERED ITEM OR SERVICE: HCPCS | Performed by: ANESTHESIOLOGY

## 2020-01-31 PROCEDURE — 700105 HCHG RX REV CODE 258: Performed by: ANESTHESIOLOGY

## 2020-01-31 PROCEDURE — 700101 HCHG RX REV CODE 250: Performed by: ANESTHESIOLOGY

## 2020-01-31 PROCEDURE — 82570 ASSAY OF URINE CREATININE: CPT

## 2020-01-31 PROCEDURE — 84156 ASSAY OF PROTEIN URINE: CPT

## 2020-01-31 PROCEDURE — 36415 COLL VENOUS BLD VENIPUNCTURE: CPT

## 2020-01-31 PROCEDURE — 304966 HCHG RECOVERY SVSC TIME ADDL 1/2 HR: Performed by: OBSTETRICS & GYNECOLOGY

## 2020-01-31 PROCEDURE — 84550 ASSAY OF BLOOD/URIC ACID: CPT

## 2020-01-31 PROCEDURE — 89060 EXAM SYNOVIAL FLUID CRYSTALS: CPT

## 2020-01-31 PROCEDURE — 86901 BLOOD TYPING SEROLOGIC RH(D): CPT

## 2020-01-31 PROCEDURE — 85025 COMPLETE CBC W/AUTO DIFF WBC: CPT

## 2020-01-31 PROCEDURE — 80053 COMPREHEN METABOLIC PANEL: CPT

## 2020-01-31 PROCEDURE — 59510 CESAREAN DELIVERY: CPT | Performed by: OBSTETRICS & GYNECOLOGY

## 2020-01-31 PROCEDURE — 86850 RBC ANTIBODY SCREEN: CPT

## 2020-01-31 PROCEDURE — 81002 URINALYSIS NONAUTO W/O SCOPE: CPT

## 2020-01-31 PROCEDURE — 86900 BLOOD TYPING SEROLOGIC ABO: CPT

## 2020-01-31 PROCEDURE — 306828 HCHG ANES-TIME GENERAL: Performed by: OBSTETRICS & GYNECOLOGY

## 2020-01-31 PROCEDURE — 305385 HCHG SURGICAL SERVICES 1/4 HOUR: Performed by: OBSTETRICS & GYNECOLOGY

## 2020-01-31 RX ORDER — PHENYLEPHRINE HCL IN 0.9% NACL 0.5 MG/5ML
SYRINGE (ML) INTRAVENOUS PRN
Status: DISCONTINUED | OUTPATIENT
Start: 2020-01-31 | End: 2020-01-31 | Stop reason: SURG

## 2020-01-31 RX ORDER — CITRIC ACID/SODIUM CITRATE 334-500MG
30 SOLUTION, ORAL ORAL ONCE
Status: COMPLETED | OUTPATIENT
Start: 2020-01-31 | End: 2020-01-31

## 2020-01-31 RX ORDER — ACETAMINOPHEN 500 MG
1000 TABLET ORAL ONCE
Status: COMPLETED | OUTPATIENT
Start: 2020-01-31 | End: 2020-01-31

## 2020-01-31 RX ORDER — MORPHINE SULFATE 0.5 MG/ML
INJECTION, SOLUTION EPIDURAL; INTRATHECAL; INTRAVENOUS PRN
Status: DISCONTINUED | OUTPATIENT
Start: 2020-01-31 | End: 2020-01-31 | Stop reason: SURG

## 2020-01-31 RX ORDER — LIDOCAINE HYDROCHLORIDE AND EPINEPHRINE 15; 5 MG/ML; UG/ML
INJECTION, SOLUTION EPIDURAL PRN
Status: DISCONTINUED | OUTPATIENT
Start: 2020-01-31 | End: 2020-01-31 | Stop reason: SURG

## 2020-01-31 RX ORDER — SODIUM CHLORIDE, SODIUM GLUCONATE, SODIUM ACETATE, POTASSIUM CHLORIDE AND MAGNESIUM CHLORIDE 526; 502; 368; 37; 30 MG/100ML; MG/100ML; MG/100ML; MG/100ML; MG/100ML
1500 INJECTION, SOLUTION INTRAVENOUS ONCE
Status: COMPLETED | OUTPATIENT
Start: 2020-01-31 | End: 2020-01-31

## 2020-01-31 RX ORDER — SCOLOPAMINE TRANSDERMAL SYSTEM 1 MG/1
PATCH, EXTENDED RELEASE TRANSDERMAL PRN
Status: DISCONTINUED | OUTPATIENT
Start: 2020-01-31 | End: 2020-01-31 | Stop reason: SURG

## 2020-01-31 RX ORDER — LIDOCAINE HCL/EPINEPHRINE/PF 2%-1:200K
VIAL (ML) INJECTION PRN
Status: DISCONTINUED | OUTPATIENT
Start: 2020-01-31 | End: 2020-01-31 | Stop reason: SURG

## 2020-01-31 RX ORDER — METOCLOPRAMIDE HYDROCHLORIDE 5 MG/ML
10 INJECTION INTRAMUSCULAR; INTRAVENOUS ONCE
Status: COMPLETED | OUTPATIENT
Start: 2020-01-31 | End: 2020-01-31

## 2020-01-31 RX ORDER — DEXAMETHASONE SODIUM PHOSPHATE 4 MG/ML
INJECTION, SOLUTION INTRA-ARTICULAR; INTRALESIONAL; INTRAMUSCULAR; INTRAVENOUS; SOFT TISSUE PRN
Status: DISCONTINUED | OUTPATIENT
Start: 2020-01-31 | End: 2020-01-31 | Stop reason: SURG

## 2020-01-31 RX ORDER — ONDANSETRON 2 MG/ML
INJECTION INTRAMUSCULAR; INTRAVENOUS PRN
Status: DISCONTINUED | OUTPATIENT
Start: 2020-01-31 | End: 2020-01-31 | Stop reason: SURG

## 2020-01-31 RX ORDER — KETOROLAC TROMETHAMINE 30 MG/ML
INJECTION, SOLUTION INTRAMUSCULAR; INTRAVENOUS PRN
Status: DISCONTINUED | OUTPATIENT
Start: 2020-01-31 | End: 2020-01-31 | Stop reason: SURG

## 2020-01-31 RX ORDER — SODIUM CHLORIDE, SODIUM GLUCONATE, SODIUM ACETATE, POTASSIUM CHLORIDE AND MAGNESIUM CHLORIDE 526; 502; 368; 37; 30 MG/100ML; MG/100ML; MG/100ML; MG/100ML; MG/100ML
INJECTION, SOLUTION INTRAVENOUS
Status: DISCONTINUED | OUTPATIENT
Start: 2020-01-31 | End: 2020-01-31 | Stop reason: SURG

## 2020-01-31 RX ORDER — CEFAZOLIN SODIUM 1 G/3ML
INJECTION, POWDER, FOR SOLUTION INTRAMUSCULAR; INTRAVENOUS PRN
Status: DISCONTINUED | OUTPATIENT
Start: 2020-01-31 | End: 2020-01-31 | Stop reason: SURG

## 2020-01-31 RX ADMIN — ACETAMINOPHEN 1000 MG: 500 TABLET ORAL at 22:10

## 2020-01-31 RX ADMIN — LIDOCAINE HYDROCHLORIDE,EPINEPHRINE BITARTRATE 5 ML: 20; .005 INJECTION, SOLUTION EPIDURAL; INFILTRATION; INTRACAUDAL; PERINEURAL at 22:42

## 2020-01-31 RX ADMIN — KETOROLAC TROMETHAMINE 30 MG: 30 INJECTION, SOLUTION INTRAMUSCULAR at 23:36

## 2020-01-31 RX ADMIN — LIDOCAINE HYDROCHLORIDE,EPINEPHRINE BITARTRATE 5 ML: 15; .005 INJECTION, SOLUTION EPIDURAL; INFILTRATION; INTRACAUDAL; PERINEURAL at 22:34

## 2020-01-31 RX ADMIN — CEFAZOLIN 3 G: 330 INJECTION, POWDER, FOR SOLUTION INTRAMUSCULAR; INTRAVENOUS at 22:38

## 2020-01-31 RX ADMIN — FENTANYL CITRATE 100 MCG: 50 INJECTION, SOLUTION INTRAMUSCULAR; INTRAVENOUS at 22:37

## 2020-01-31 RX ADMIN — FAMOTIDINE 20 MG: 10 INJECTION INTRAVENOUS at 21:59

## 2020-01-31 RX ADMIN — DEXAMETHASONE SODIUM PHOSPHATE 8 MG: 4 INJECTION, SOLUTION INTRA-ARTICULAR; INTRALESIONAL; INTRAMUSCULAR; INTRAVENOUS; SOFT TISSUE at 23:06

## 2020-01-31 RX ADMIN — LIDOCAINE HYDROCHLORIDE,EPINEPHRINE BITARTRATE 10 ML: 20; .005 INJECTION, SOLUTION EPIDURAL; INFILTRATION; INTRACAUDAL; PERINEURAL at 22:37

## 2020-01-31 RX ADMIN — MORPHINE SULFATE 2 MG: 0.5 INJECTION, SOLUTION EPIDURAL; INTRATHECAL; INTRAVENOUS at 23:09

## 2020-01-31 RX ADMIN — SCOLOPAMINE TRANSDERMAL SYSTEM 1 PATCH: 1 PATCH, EXTENDED RELEASE TRANSDERMAL at 22:39

## 2020-01-31 RX ADMIN — ONDANSETRON 4 MG: 2 INJECTION INTRAMUSCULAR; INTRAVENOUS at 23:36

## 2020-01-31 RX ADMIN — SODIUM CHLORIDE, SODIUM GLUCONATE, SODIUM ACETATE, POTASSIUM CHLORIDE AND MAGNESIUM CHLORIDE 1500 ML: 526; 502; 368; 37; 30 INJECTION, SOLUTION INTRAVENOUS at 21:59

## 2020-01-31 RX ADMIN — ACETAMINOPHEN 1000 MG: 500 TABLET, FILM COATED ORAL at 21:59

## 2020-01-31 RX ADMIN — SODIUM CHLORIDE, SODIUM GLUCONATE, SODIUM ACETATE, POTASSIUM CHLORIDE AND MAGNESIUM CHLORIDE: 526; 502; 368; 37; 30 INJECTION, SOLUTION INTRAVENOUS at 22:21

## 2020-01-31 RX ADMIN — SODIUM CITRATE AND CITRIC ACID MONOHYDRATE 30 ML: 500; 334 SOLUTION ORAL at 21:58

## 2020-01-31 RX ADMIN — OXYTOCIN 1000 ML: 10 INJECTION, SOLUTION INTRAMUSCULAR; INTRAVENOUS at 23:06

## 2020-01-31 RX ADMIN — Medication 100 MCG: at 22:50

## 2020-01-31 RX ADMIN — METOCLOPRAMIDE 10 MG: 5 INJECTION, SOLUTION INTRAMUSCULAR; INTRAVENOUS at 21:59

## 2020-01-31 ASSESSMENT — LIFESTYLE VARIABLES
EVER FELT BAD OR GUILTY ABOUT YOUR DRINKING: NO
TOTAL SCORE: 0
HAVE YOU EVER FELT YOU SHOULD CUT DOWN ON YOUR DRINKING: NO
ALCOHOL_USE: NO
TOTAL SCORE: 0
TOTAL SCORE: 0
HAVE PEOPLE ANNOYED YOU BY CRITICIZING YOUR DRINKING: NO
EVER_SMOKED: YES
EVER HAD A DRINK FIRST THING IN THE MORNING TO STEADY YOUR NERVES TO GET RID OF A HANGOVER: NO
CONSUMPTION TOTAL: INCOMPLETE

## 2020-01-31 ASSESSMENT — PATIENT HEALTH QUESTIONNAIRE - PHQ9
SUM OF ALL RESPONSES TO PHQ9 QUESTIONS 1 AND 2: 0
2. FEELING DOWN, DEPRESSED, IRRITABLE, OR HOPELESS: NOT AT ALL
1. LITTLE INTEREST OR PLEASURE IN DOING THINGS: NOT AT ALL

## 2020-01-31 ASSESSMENT — COPD QUESTIONNAIRES
IN THE PAST 12 MONTHS DO YOU DO LESS THAN YOU USED TO BECAUSE OF YOUR BREATHING PROBLEMS: DISAGREE/UNSURE
DURING THE PAST 4 WEEKS HOW MUCH DID YOU FEEL SHORT OF BREATH: NONE/LITTLE OF THE TIME
HAVE YOU SMOKED AT LEAST 100 CIGARETTES IN YOUR ENTIRE LIFE: NO/DON'T KNOW
DO YOU EVER COUGH UP ANY MUCUS OR PHLEGM?: NO/ONLY WITH OCCASIONAL COLDS OR INFECTIONS

## 2020-01-31 NOTE — PROGRESS NOTES
38w5d    Patient arrived for scheduled return OB visit and NST secondary to chronic hypertension  Reports feeling very poorly in general to MA.  Initial blood pressure 162/88    We will send to L&D now, anticipate she will be delivered given her history of chronic hypertension which is indicated to be delivered between 38 and 39 weeks per ACOG and elevated blood pressure today.  OB on-call Dr. Frost in charge nurse made aware that patient will be coming.      Miladis Barrett MD  [facility], Women's [facility]

## 2020-02-01 LAB
ACTION RH IMMUNE GLOB 8505RHG: NORMAL
ANION GAP SERPL CALC-SCNC: 12 MMOL/L (ref 0–11.9)
BASOPHILS # BLD AUTO: 0.4 % (ref 0–1.8)
BASOPHILS # BLD: 0.06 K/UL (ref 0–0.12)
BLD GP AB INVEST PLASRBC-IMP: ABNORMAL
BUN SERPL-MCNC: 23 MG/DL (ref 8–22)
CALCIUM SERPL-MCNC: 9.4 MG/DL (ref 8.5–10.5)
CHLORIDE SERPL-SCNC: 102 MMOL/L (ref 96–112)
CO2 SERPL-SCNC: 21 MMOL/L (ref 20–33)
CREAT SERPL-MCNC: 0.75 MG/DL (ref 0.5–1.4)
EOSINOPHIL # BLD AUTO: 0.11 K/UL (ref 0–0.51)
EOSINOPHIL NFR BLD: 0.7 % (ref 0–6.9)
ERYTHROCYTE [DISTWIDTH] IN BLOOD BY AUTOMATED COUNT: 39.4 FL (ref 35.9–50)
ERYTHROCYTE [DISTWIDTH] IN BLOOD BY AUTOMATED COUNT: 40.3 FL (ref 35.9–50)
GLUCOSE SERPL-MCNC: 104 MG/DL (ref 65–99)
HCT VFR BLD AUTO: 34.8 % (ref 37–47)
HCT VFR BLD AUTO: 35.2 % (ref 37–47)
HGB BLD-MCNC: 11.5 G/DL (ref 12–16)
HGB BLD-MCNC: 11.5 G/DL (ref 12–16)
IMM GRANULOCYTES # BLD AUTO: 0.1 K/UL (ref 0–0.11)
IMM GRANULOCYTES NFR BLD AUTO: 0.6 % (ref 0–0.9)
IMMUNE ROSETTING TEST 8505FMH: NORMAL
LYMPHOCYTES # BLD AUTO: 3.35 K/UL (ref 1–4.8)
LYMPHOCYTES NFR BLD: 20 % (ref 22–41)
MCH RBC QN AUTO: 27.2 PG (ref 27–33)
MCH RBC QN AUTO: 27.5 PG (ref 27–33)
MCHC RBC AUTO-ENTMCNC: 32.7 G/DL (ref 33.6–35)
MCHC RBC AUTO-ENTMCNC: 33 G/DL (ref 33.6–35)
MCV RBC AUTO: 83.2 FL (ref 81.4–97.8)
MCV RBC AUTO: 83.3 FL (ref 81.4–97.8)
MONOCYTES # BLD AUTO: 1.18 K/UL (ref 0–0.85)
MONOCYTES NFR BLD AUTO: 7 % (ref 0–13.4)
NEUTROPHILS # BLD AUTO: 11.96 K/UL (ref 2–7.15)
NEUTROPHILS NFR BLD: 71.3 % (ref 44–72)
NRBC # BLD AUTO: 0 K/UL
NRBC BLD-RTO: 0 /100 WBC
NUMBER OF RH DOSES IND 8505RD: 1
PLATELET # BLD AUTO: 276 K/UL (ref 164–446)
PLATELET # BLD AUTO: 314 K/UL (ref 164–446)
PMV BLD AUTO: 9.3 FL (ref 9–12.9)
PMV BLD AUTO: 9.4 FL (ref 9–12.9)
POTASSIUM SERPL-SCNC: 4.1 MMOL/L (ref 3.6–5.5)
RBC # BLD AUTO: 4.18 M/UL (ref 4.2–5.4)
RBC # BLD AUTO: 4.23 M/UL (ref 4.2–5.4)
SODIUM SERPL-SCNC: 135 MMOL/L (ref 135–145)
WBC # BLD AUTO: 15.9 K/UL (ref 4.8–10.8)
WBC # BLD AUTO: 16.8 K/UL (ref 4.8–10.8)
XXX BLOOD GROUP AB TITR SERPL AHG: 1 {TITER}

## 2020-02-01 PROCEDURE — A9270 NON-COVERED ITEM OR SERVICE: HCPCS | Performed by: OBSTETRICS & GYNECOLOGY

## 2020-02-01 PROCEDURE — 770002 HCHG ROOM/CARE - OB PRIVATE (112)

## 2020-02-01 PROCEDURE — 86922 COMPATIBILITY TEST ANTIGLOB: CPT | Mod: 91

## 2020-02-01 PROCEDURE — 700111 HCHG RX REV CODE 636 W/ 250 OVERRIDE (IP): Performed by: OBSTETRICS & GYNECOLOGY

## 2020-02-01 PROCEDURE — 700102 HCHG RX REV CODE 250 W/ 637 OVERRIDE(OP): Performed by: OBSTETRICS & GYNECOLOGY

## 2020-02-01 PROCEDURE — 59514 CESAREAN DELIVERY ONLY: CPT

## 2020-02-01 PROCEDURE — 700111 HCHG RX REV CODE 636 W/ 250 OVERRIDE (IP)

## 2020-02-01 PROCEDURE — 36415 COLL VENOUS BLD VENIPUNCTURE: CPT

## 2020-02-01 PROCEDURE — 700102 HCHG RX REV CODE 250 W/ 637 OVERRIDE(OP): Performed by: ANESTHESIOLOGY

## 2020-02-01 PROCEDURE — 700102 HCHG RX REV CODE 250 W/ 637 OVERRIDE(OP): Performed by: STUDENT IN AN ORGANIZED HEALTH CARE EDUCATION/TRAINING PROGRAM

## 2020-02-01 PROCEDURE — 85025 COMPLETE CBC W/AUTO DIFF WBC: CPT

## 2020-02-01 PROCEDURE — A9270 NON-COVERED ITEM OR SERVICE: HCPCS | Performed by: ANESTHESIOLOGY

## 2020-02-01 PROCEDURE — A9270 NON-COVERED ITEM OR SERVICE: HCPCS | Performed by: STUDENT IN AN ORGANIZED HEALTH CARE EDUCATION/TRAINING PROGRAM

## 2020-02-01 PROCEDURE — 700111 HCHG RX REV CODE 636 W/ 250 OVERRIDE (IP): Performed by: ANESTHESIOLOGY

## 2020-02-01 PROCEDURE — 80048 BASIC METABOLIC PNL TOTAL CA: CPT

## 2020-02-01 PROCEDURE — 85027 COMPLETE CBC AUTOMATED: CPT

## 2020-02-01 PROCEDURE — 86870 RBC ANTIBODY IDENTIFICATION: CPT

## 2020-02-01 PROCEDURE — 85461 HEMOGLOBIN FETAL: CPT

## 2020-02-01 PROCEDURE — 700105 HCHG RX REV CODE 258: Performed by: OBSTETRICS & GYNECOLOGY

## 2020-02-01 PROCEDURE — 86886 COOMBS TEST INDIRECT TITER: CPT

## 2020-02-01 RX ORDER — ACETAMINOPHEN 500 MG
1000 TABLET ORAL EVERY 6 HOURS
Status: DISPENSED | OUTPATIENT
Start: 2020-02-01 | End: 2020-02-02

## 2020-02-01 RX ORDER — DIPHENHYDRAMINE HYDROCHLORIDE 50 MG/ML
12.5 INJECTION INTRAMUSCULAR; INTRAVENOUS EVERY 6 HOURS PRN
Status: DISCONTINUED | OUTPATIENT
Start: 2020-02-01 | End: 2020-02-01

## 2020-02-01 RX ORDER — ONDANSETRON 4 MG/1
4 TABLET, ORALLY DISINTEGRATING ORAL EVERY 6 HOURS PRN
Status: DISCONTINUED | OUTPATIENT
Start: 2020-02-01 | End: 2020-02-03 | Stop reason: HOSPADM

## 2020-02-01 RX ORDER — ACETAMINOPHEN 325 MG/1
325 TABLET ORAL EVERY 4 HOURS PRN
Status: DISCONTINUED | OUTPATIENT
Start: 2020-02-01 | End: 2020-02-03 | Stop reason: HOSPADM

## 2020-02-01 RX ORDER — KETOROLAC TROMETHAMINE 30 MG/ML
30 INJECTION, SOLUTION INTRAMUSCULAR; INTRAVENOUS EVERY 6 HOURS
Status: DISPENSED | OUTPATIENT
Start: 2020-02-01 | End: 2020-02-01

## 2020-02-01 RX ORDER — SIMETHICONE 80 MG
80 TABLET,CHEWABLE ORAL 4 TIMES DAILY PRN
Status: DISCONTINUED | OUTPATIENT
Start: 2020-02-01 | End: 2020-02-03 | Stop reason: HOSPADM

## 2020-02-01 RX ORDER — DIPHENHYDRAMINE HYDROCHLORIDE 50 MG/ML
25 INJECTION INTRAMUSCULAR; INTRAVENOUS EVERY 6 HOURS PRN
Status: DISCONTINUED | OUTPATIENT
Start: 2020-02-01 | End: 2020-02-01

## 2020-02-01 RX ORDER — MISOPROSTOL 200 UG/1
600 TABLET ORAL
Status: DISCONTINUED | OUTPATIENT
Start: 2020-02-01 | End: 2020-02-03 | Stop reason: HOSPADM

## 2020-02-01 RX ORDER — ONDANSETRON 2 MG/ML
4 INJECTION INTRAMUSCULAR; INTRAVENOUS EVERY 6 HOURS PRN
Status: DISCONTINUED | OUTPATIENT
Start: 2020-02-01 | End: 2020-02-03 | Stop reason: HOSPADM

## 2020-02-01 RX ORDER — MEPERIDINE HYDROCHLORIDE 25 MG/ML
12.5 INJECTION INTRAMUSCULAR; INTRAVENOUS; SUBCUTANEOUS
Status: DISCONTINUED | OUTPATIENT
Start: 2020-02-01 | End: 2020-02-01 | Stop reason: HOSPADM

## 2020-02-01 RX ORDER — OXYCODONE HYDROCHLORIDE AND ACETAMINOPHEN 5; 325 MG/1; MG/1
1 TABLET ORAL EVERY 4 HOURS PRN
Status: DISCONTINUED | OUTPATIENT
Start: 2020-02-01 | End: 2020-02-03 | Stop reason: HOSPADM

## 2020-02-01 RX ORDER — OXYCODONE HCL 5 MG/5 ML
5 SOLUTION, ORAL ORAL
Status: DISCONTINUED | OUTPATIENT
Start: 2020-02-01 | End: 2020-02-01 | Stop reason: HOSPADM

## 2020-02-01 RX ORDER — OXYCODONE HYDROCHLORIDE 10 MG/1
10 TABLET ORAL EVERY 4 HOURS PRN
Status: DISCONTINUED | OUTPATIENT
Start: 2020-02-01 | End: 2020-02-03 | Stop reason: HOSPADM

## 2020-02-01 RX ORDER — HYDROMORPHONE HYDROCHLORIDE 1 MG/ML
0.4 INJECTION, SOLUTION INTRAMUSCULAR; INTRAVENOUS; SUBCUTANEOUS
Status: DISCONTINUED | OUTPATIENT
Start: 2020-02-01 | End: 2020-02-01

## 2020-02-01 RX ORDER — VITAMIN A ACETATE, BETA CAROTENE, ASCORBIC ACID, CHOLECALCIFEROL, .ALPHA.-TOCOPHEROL ACETATE, DL-, THIAMINE MONONITRATE, RIBOFLAVIN, NIACINAMIDE, PYRIDOXINE HYDROCHLORIDE, FOLIC ACID, CYANOCOBALAMIN, CALCIUM CARBONATE, FERROUS FUMARATE, ZINC OXIDE, CUPRIC OXIDE 3080; 12; 120; 400; 1; 1.84; 3; 20; 22; 920; 25; 200; 27; 10; 2 [IU]/1; UG/1; MG/1; [IU]/1; MG/1; MG/1; MG/1; MG/1; MG/1; [IU]/1; MG/1; MG/1; MG/1; MG/1; MG/1
1 TABLET, FILM COATED ORAL EVERY MORNING
Status: DISCONTINUED | OUTPATIENT
Start: 2020-02-01 | End: 2020-02-03 | Stop reason: HOSPADM

## 2020-02-01 RX ORDER — OXYCODONE HCL 5 MG/5 ML
10 SOLUTION, ORAL ORAL
Status: DISCONTINUED | OUTPATIENT
Start: 2020-02-01 | End: 2020-02-01 | Stop reason: HOSPADM

## 2020-02-01 RX ORDER — OXYCODONE HYDROCHLORIDE 5 MG/1
5 TABLET ORAL EVERY 4 HOURS PRN
Status: DISCONTINUED | OUTPATIENT
Start: 2020-02-01 | End: 2020-02-01

## 2020-02-01 RX ORDER — FLUOXETINE 10 MG/1
10 CAPSULE ORAL DAILY
Status: DISCONTINUED | OUTPATIENT
Start: 2020-02-01 | End: 2020-02-03 | Stop reason: HOSPADM

## 2020-02-01 RX ORDER — IBUPROFEN 600 MG/1
600 TABLET ORAL EVERY 6 HOURS PRN
Status: DISCONTINUED | OUTPATIENT
Start: 2020-02-01 | End: 2020-02-03 | Stop reason: HOSPADM

## 2020-02-01 RX ORDER — HALOPERIDOL 5 MG/ML
1 INJECTION INTRAMUSCULAR
Status: DISCONTINUED | OUTPATIENT
Start: 2020-02-01 | End: 2020-02-01 | Stop reason: HOSPADM

## 2020-02-01 RX ORDER — LABETALOL 100 MG/1
200 TABLET, FILM COATED ORAL TWICE DAILY
Status: DISCONTINUED | OUTPATIENT
Start: 2020-02-01 | End: 2020-02-02

## 2020-02-01 RX ORDER — MORPHINE SULFATE 4 MG/ML
4 INJECTION, SOLUTION INTRAMUSCULAR; INTRAVENOUS
Status: DISCONTINUED | OUTPATIENT
Start: 2020-02-01 | End: 2020-02-03 | Stop reason: HOSPADM

## 2020-02-01 RX ORDER — ONDANSETRON 2 MG/ML
4 INJECTION INTRAMUSCULAR; INTRAVENOUS
Status: DISCONTINUED | OUTPATIENT
Start: 2020-02-01 | End: 2020-02-01 | Stop reason: HOSPADM

## 2020-02-01 RX ORDER — HYDROMORPHONE HYDROCHLORIDE 1 MG/ML
0.2 INJECTION, SOLUTION INTRAMUSCULAR; INTRAVENOUS; SUBCUTANEOUS
Status: DISCONTINUED | OUTPATIENT
Start: 2020-02-01 | End: 2020-02-01

## 2020-02-01 RX ORDER — SODIUM CHLORIDE, SODIUM LACTATE, POTASSIUM CHLORIDE, CALCIUM CHLORIDE 600; 310; 30; 20 MG/100ML; MG/100ML; MG/100ML; MG/100ML
INJECTION, SOLUTION INTRAVENOUS CONTINUOUS
Status: DISCONTINUED | OUTPATIENT
Start: 2020-02-01 | End: 2020-02-03 | Stop reason: HOSPADM

## 2020-02-01 RX ORDER — OXYCODONE HYDROCHLORIDE 10 MG/1
10 TABLET ORAL EVERY 4 HOURS PRN
Status: DISCONTINUED | OUTPATIENT
Start: 2020-02-01 | End: 2020-02-01

## 2020-02-01 RX ORDER — ONDANSETRON 2 MG/ML
4 INJECTION INTRAMUSCULAR; INTRAVENOUS EVERY 6 HOURS PRN
Status: DISCONTINUED | OUTPATIENT
Start: 2020-02-01 | End: 2020-02-01

## 2020-02-01 RX ORDER — DIPHENHYDRAMINE HYDROCHLORIDE 50 MG/ML
25 INJECTION INTRAMUSCULAR; INTRAVENOUS EVERY 6 HOURS PRN
Status: DISCONTINUED | OUTPATIENT
Start: 2020-02-01 | End: 2020-02-03 | Stop reason: HOSPADM

## 2020-02-01 RX ORDER — DIPHENHYDRAMINE HCL 25 MG
25 TABLET ORAL EVERY 6 HOURS PRN
Status: DISCONTINUED | OUTPATIENT
Start: 2020-02-01 | End: 2020-02-03 | Stop reason: HOSPADM

## 2020-02-01 RX ORDER — SODIUM CHLORIDE, SODIUM LACTATE, POTASSIUM CHLORIDE, CALCIUM CHLORIDE 600; 310; 30; 20 MG/100ML; MG/100ML; MG/100ML; MG/100ML
INJECTION, SOLUTION INTRAVENOUS PRN
Status: DISCONTINUED | OUTPATIENT
Start: 2020-02-01 | End: 2020-02-03 | Stop reason: HOSPADM

## 2020-02-01 RX ADMIN — ENOXAPARIN SODIUM 30 MG: 100 INJECTION SUBCUTANEOUS at 12:08

## 2020-02-01 RX ADMIN — ENOXAPARIN SODIUM 30 MG: 100 INJECTION SUBCUTANEOUS at 22:05

## 2020-02-01 RX ADMIN — KETOROLAC TROMETHAMINE 30 MG: 30 INJECTION, SOLUTION INTRAMUSCULAR; INTRAVENOUS at 12:10

## 2020-02-01 RX ADMIN — OXYTOCIN 125 ML/HR: 10 INJECTION, SOLUTION INTRAMUSCULAR; INTRAVENOUS at 00:26

## 2020-02-01 RX ADMIN — KETOROLAC TROMETHAMINE 30 MG: 30 INJECTION, SOLUTION INTRAMUSCULAR; INTRAVENOUS at 05:30

## 2020-02-01 RX ADMIN — FLUOXETINE 10 MG: 10 CAPSULE ORAL at 08:41

## 2020-02-01 RX ADMIN — LABETALOL HYDROCHLORIDE 200 MG: 100 TABLET, FILM COATED ORAL at 17:59

## 2020-02-01 RX ADMIN — ACETAMINOPHEN 1000 MG: 500 TABLET ORAL at 02:10

## 2020-02-01 RX ADMIN — SODIUM CHLORIDE, POTASSIUM CHLORIDE, SODIUM LACTATE AND CALCIUM CHLORIDE: 600; 310; 30; 20 INJECTION, SOLUTION INTRAVENOUS at 08:05

## 2020-02-01 RX ADMIN — LABETALOL HYDROCHLORIDE 200 MG: 100 TABLET, FILM COATED ORAL at 08:05

## 2020-02-01 RX ADMIN — ACETAMINOPHEN 1000 MG: 500 TABLET ORAL at 08:05

## 2020-02-01 ASSESSMENT — PAIN SCALES - GENERAL: PAIN_LEVEL: 0

## 2020-02-01 NOTE — PROGRESS NOTES
Pt presents to L&D after having an elevated BP in the office. Pt ambulated to LDA 6 for assessment.     1628 TOCO and EFM applied, VSS. Pt reports +FM, denies VB but states she has noticed an increase in vaginal discharge that is more watery than normal. Pt states she has had a HA on and off, denies epigastric pain or any blurred vision. Will take serial BP's for further assessment. Pt states they only took one BP at the office. PT states she last ate around 1030 this morning.     1640 Dr. Frost updated, pt to be NPO at this time.     1655 Dr. Frost at bedside    1715 SSE performed, moderate amount of white/clear discharge noted, FERN collected.     1730 Dr. Frost updated, will continue to monitor.     1755 RN at bedside, pts BP cuff about to pop off during BP. Unable to stop in time. New BP cuff grab and repositioned on pts arm.     1815 FERN negative    1830 Dr. Frost updated, will continue to monitor    1915 Dr. Frost updated, no decision made at this time. Pt educated on POC. Pt beginning to get impatient and would like to have a decision made. Pt encouraged to be pt and educated that the doctor was trying to make a safe and good decision regarding her care.     2000 Report given to Marcelino OLIVA, POC discussed

## 2020-02-01 NOTE — ANESTHESIA PROCEDURE NOTES
Epidural Block  Date/Time: 1/31/2020 10:26 PM  Performed by: Sherry Chi M.D.  Authorized by: Sherry Chi M.D.     Patient Location:  OB  Start Time:  1/31/2020 10:26 PM  End Time:  1/31/2020 10:36 PM  Reason for Block: primary anesthetic    patient identified, IV checked, site marked, risks and benefits discussed, surgical consent, monitors and equipment checked, pre-op evaluation and timeout performed    Patient Position:  Sitting  Prep: ChloraPrep, patient draped and sterile technique    Monitoring:  Blood pressure, continuous pulse oximetry and heart rate  Approach:  Midline  Location:  L2-L3  Injection Technique:  SIVAKUMAR saline  Skin infiltration:  Lidocaine  Strength:  1%  Dose:  3ml  Needle Type:  Tuohy  Needle Gauge:  17 G  Needle Length:  3.5 in  Loss of resistance::  9  Catheter Size:  19 G  Catheter at Skin Depth:  14  Test Dose:  Lidocaine 1.5% with epinephrine 1-to-200,000  Test Dose Result:  Negative

## 2020-02-01 NOTE — PROGRESS NOTES
2200 Report received from A Mellissa RN, pt being prepped for C/S in LDA 6    2210 Dr Chi at bedside discussing procedure with pt    2219 Pt out of preop to OR 1    2221 Pt in OR 1    2305 viable baby girl born via  , APGARs 6/9    2348 pt out of OR to PACU bed 3 in stable condition. Mother and infant bonding.    0100 pt transferred to PPU with baby in arms, both in stable condition. Bedside report given to PPU RN, assumed care. POC discussed.
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
## 2020-02-01 NOTE — ANESTHESIA TIME REPORT
Anesthesia Start and Stop Event Times     Date Time Event    2020 2144 Ready for Procedure     2221 Anesthesia Start     2351 Anesthesia Stop        Responsible Staff  20    Name Role Begin End    Sherry Chi M.D. Anesth 2221 [id number]        Preop Diagnosis (Free Text):  Pre-op Diagnosis     gestational hypertension        Preop Diagnosis (Codes):  Diagnosis Information     Diagnosis Code(s):  delivery delivered [O82]        Post op Diagnosis  Previous  section  gestational hypertensin    Premium Reason  A. 3PM - 7AM    Comments:

## 2020-02-01 NOTE — ANESTHESIA QCDR
2019 [facility] Clinical Data Registry (for Quality Improvement)     Postoperative nausea/vomiting risk protocol (Adult = 18 yrs and Pediatric 3-17 yrs)- (430 and 463)  General inhalation anesthetic (NOT TIVA) with PONV risk factors: No  Provision of anti-emetic therapy with at least 2 different classes of agents: N/A  Patient DID NOT receive anti-emetic therapy and reason is documented in Medical Record: N/A    Multimodal Pain Management- (477)  Non-emergent surgery AND patient age >= 18: Yes  Use of Multimodal Pain Management, two or more drugs and/or interventions, NOT including systemic opioids: Yes  Exception: Documented allergy to multiple classes of analgesics: N/A    Smoking Abstinence (404)  Patient is current smoker (cigarette, pipe, e-cig, marijuanna): No  Elective Surgery:   Abstinence instructions provided prior to day of surgery:   Patient abstained from smoking on day of surgery:     Pre-Op Beta-Blocker in Isolated CABG (44)  Isolated CABG AND patient age >= 18: No  Beta-blocker admin within 24 hours of surgical incision:   Exception:of medical reason(s) for not administering beta blocker within 24 hours prior to surgical incision (e.g., not  indicated,other medical reason):     PACU assessment of acute postoperative pain prior to Anesthesia Care End- Applies to Patients Age = 18- (ABG7)  Initial PACU pain score is which of the following: < 7/10  Patient unable to report pain score: N/A    Post-anesthetic transfer of care checklist/protocol to PACU/ICU- (426 and 427)  Upon conclusion of case, patient transferred to which of the following locations: PACU/Non-ICU  Use of transfer checklist/protocol: Yes  Exclusion: Service Performed in Patient Hospital Room (and thus did not require transfer): N/A  Unplanned admission to ICU related to anesthesia service up through end of PACU care- (MD51)  Unplanned admission to ICU (not initially anticipated at anesthesia start time): No

## 2020-02-01 NOTE — PROGRESS NOTES
0103: Patient transferred from labor and delivery. Two RN verification of infant and parent armbands. Report received from PJ Trent. Patient oriented to unit, call light, emergency light, and infant security. Assessment completed, fundus firm, lochia moderate. Patient has booker in place with active order, and pulse ox in place. Pain medication administered per MAR. Pitocin infusing at 125 ml/hr. Patient encouraged to call with needs.

## 2020-02-01 NOTE — ANESTHESIA POSTPROCEDURE EVALUATION
Patient: Sonya Flor    Procedure Summary     Date:  20 Room / Location:  [facility] OR 01 / LABOR AND DELIVERY    Anesthesia Start:   Anesthesia Stop:      Procedure:   SECTION, REPEAT (Bilateral ) Diagnosis:        delivery delivered      (gestational hypertension)    Surgeon:  Juliano Frost M.D. Responsible Provider:  Sherry Chi M.D.    Anesthesia Type:  epidural ASA Status:  3          Final Anesthesia Type: epidural  Last vitals  BP   114/59   Temp 36.2       Pulse   82   Resp     18   SpO2 95         Anesthesia Post Evaluation    Patient location during evaluation: PACU  Patient participation: complete - patient participated  Level of consciousness: awake  Pain score: 0    Airway patency: patent  Anesthetic complications: no  Cardiovascular status: adequate  Respiratory status: acceptable  Hydration status: acceptable    PONV: none
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
## 2020-02-01 NOTE — OP REPORT
DATE OF SERVICE:  2020    PREOPERATIVE DIAGNOSIS:  Intrauterine pregnancy at 38-5/7 weeks with history   of previous  section and chronic hypertension at term.    POSTOPERATIVE DIAGNOSIS:  Intrauterine pregnancy at 38-5/7 weeks with history   of previous  section and chronic hypertension at term.    SURGEON:  Juliano Frost MD    ASSISTANT:  Maria Meza CNM    ANESTHESIA:  Spinal/epidural.    ANESTHESIOLOGIST:  Sherry Chi MD    ESTIMATED BLOOD LOSS:  600 mL    SPECIMENS:  Cord gas sent to pathology department.    COMPLICATIONS:  None.    DRAINS:  Davies catheter.    PROCEDURE:  Repeat low transverse  section.    INDICATIONS:  This patient is a 39-year-old white female,  7, para   4-0-2-4 at 38-5/7 weeks with chronic hypertension and morbid obesity.  The   patient presents with headaches and we elected to perform repeat    section.    FINDINGS:  Apgar scores of 6 and 9, cephalic presentation, nuchal cord x1.    Cord gas results are pending.  Some small amount of adhesions from the bladder   to the anterior portion of the uterus.  Normal ovaries.    DESCRIPTION OF OPERATION:  After adequately being counseled, the patient was   taken to the operating room and placed in the supine position.  Spinal and   epidural anesthesia was induced.  She was prepped and draped in the usual   sterile fashion.  Fetal heart tones were noted to be normal before and after   placement of the spinal.  A Pfannenstiel skin incision made over the previous   one, taken to the fascia.  The fascia was incised with a scalpel and the   fascial incision taken laterally on both sides with Chapman scissors.  Rectus   fascia dissected off the underlying rectus muscles both superiorly and   inferiorly and the rectus muscles were split in the midline.  The peritoneal   cavity was entered by grasping the peritoneum and elevating with hemostats and   incising with Metzenbaum scissors.  Peritoneal  incision taken superiorly and   inferiorly and a bladder blade placed over the bladder.  Next, bladder flap   was developed both sharply and bluntly and bladder blade replaced over the   bladder.  Next, a low transverse uterine incision was made with a scalpel.    Infant was delivered, bulb suctioned, umbilical cord clamped and cut and the   infant handed off to pediatrics.  Placenta was removed from the uterus and the   uterine cavity was cleansed with a moist laparotomy sponge.  Uterus was   exteriorized and the uterine incision was closed in 2 layers using #0 Vicryl   in a running locked fashion.  Good hemostasis noted.  Uterus returned to   abdominopelvic cavity and hemostasis again confirmed.  Peritoneal lining was   then closed using running nonlocked stitch of #0 Vicryl and the rectus muscles   were reapproximated using interrupted stitch of #0 chromic.  The rectus   fascia closed using running nonlocked stitch of #0 PDS.  Subcutaneous tissues   were irrigated and suctioned and electrocautery used for hemostasis.  Several   subcuticular stitches of #0 Vicryl were used to reapproximate the subcutaneous   tissues.  Skin was closed using surgical staples and a pressure dressing was   applied.  The patient was taken to the recovery room in good condition.  No   complications noted.       ____________________________________     MD RAY SYED / SUSAN    DD:  02/01/2020 00:06:10  DT:  02/01/2020 00:24:35    D#:  [id number]  Job#:  [id number]

## 2020-02-01 NOTE — PROGRESS NOTES
POSTOPERATIVE  SECTION PROGRESS NOTE;        PATIENT ID:  NAME:  Sonya Flor  MRN:               [id number]  YOB: 1980     39 y.o. female  at 38w5d POD#1 s/p repeat  sec      Subjective: Doing well without complaints    Objective:    Vitals:    20 0100 20 0120 20 0200 20 0300   BP:  159/81     Pulse: 88 84 84 88   Resp:  18 18 18   Temp:  37.2 °C (98.9 °F)     TempSrc:  Temporal     SpO2: 93% 93% 95% 95%   Weight:       Height:         General: No acute distress, resting comfortably in bed.  HEENT: normocephalic, nontraumatic, PERRLA, EOMI  Cardiovascular: Heart RRR with no murmurs, rubs or gallops. Distal Pulses 2+  Respiratory: symmetric chest expansion, lungs CTA bilaterally with no wheezes rales or rhonci  Abdomen: soft, mildly tender around incision which is clean, dry and intact, fundus firm, +BS  Genitourinary: lochia light, denies excessive vaginal bleeding  Musculoskeletal: strength 5/5 in four extremities  Neuro: non focal with no numbness, tingling or changes in sensation      Recent Labs     20   WBC 14.6*   RBC 4.36   HEMOGLOBIN 11.8*   HEMATOCRIT 36.6*   MCV 83.9   MCH 27.1   RDW 40.9   PLATELETCT 273   MPV 9.2   NEUTSPOLYS 74.60*   LYMPHOCYTES 18.70*   MONOCYTES 4.50   EOSINOPHILS 1.20   BASOPHILS 0.40     Recent Labs     20   SODIUM 136   POTASSIUM 3.8   CHLORIDE 107   CO2 21   GLUCOSE 83   BUN 13       Current Meds:   Current Facility-Administered Medications   Medication Dose Frequency Provider Last Rate Last Dose   • lactated ringers infusion   Continuous Sherry Chi M.D.       • acetaminophen (TYLENOL) tablet 1,000 mg  1,000 mg Q6HR Sherry Chi M.D.   1,000 mg at 20 0210   • ketorolac (TORADOL) injection 30 mg  30 mg Q6HRS Sherry Chi M.D.       • oxyCODONE immediate-release (ROXICODONE) tablet 5 mg  5 mg Q4HRS PRN Sherry Chi M.D.       • oxyCODONE immediate release (ROXICODONE)  tablet 10 mg  10 mg Q4HRS PRN Sherry Chi M.D.       • ondansetron (ZOFRAN) syringe/vial injection 4 mg  4 mg Q6HRS PRN Sherry Chi M.D.       • diphenhydrAMINE (BENADRYL) injection 12.5 mg  12.5 mg Q6HRS PRN Sherry Chi M.D.       • diphenhydrAMINE (BENADRYL) injection 12.5 mg  12.5 mg Q6HRS PRN Sherry Chi M.D.        Or   • diphenhydrAMINE (BENADRYL) injection 25 mg  25 mg Q6HRS PRN Sherry Chi M.D.        Or   • naloxone (NARCAN) 0.4 mg in NS 1,000 mL infusion  0.4 mg PRN Sherry Chi M.D.       • HYDROmorphone pf (DILAUDID) injection 0.4 mg  0.4 mg Q2HRS PRN Sherry Chi M.D.       • HYDROmorphone pf (DILAUDID) injection 0.2 mg  0.2 mg Q2HRS PRN Sherry Chi M.D.       • LR infusion   PRN Juliano Frost M.D.       • PRN oxytocin (PITOCIN) (20 Units/1000 mL) PRN for excessive uterine bleeding - See Admin Instr  125-999 mL/hr Once PRN Juliano Frost M.D.       • miSOPROStol (CYTOTEC) tablet 600 mcg  600 mcg Once PRN Juliano Frost M.D.       • simethicone (MYLICON) chewable tab 80 mg  80 mg 4X/DAY PRN Juliano Frost M.D.       • prenatal plus vitamin (STUARTNATAL 1+1) 27-1 MG tablet 1 Tab  1 Tab QAM Juliano Frost M.D.       • oxytocin (PITOCIN) 20 UNITS/1000ML LR (postpartum)   mL/hr Continuous Juliano Frost M.D. 125 mL/hr at 20 0026 125 mL/hr at 20 0026   Last reviewed on 2020 11:28 PM by Miladis Nichole R.N.          Assessment:  39 y.o. female  at 38w5d POD#1 s/p repeat  section-stable    Plan:   1. We will start Lovenox for full axis  2. Disposition: As indicated    Juliano Frost MD

## 2020-02-02 PROCEDURE — 770002 HCHG ROOM/CARE - OB PRIVATE (112)

## 2020-02-02 PROCEDURE — 700102 HCHG RX REV CODE 250 W/ 637 OVERRIDE(OP): Performed by: OBSTETRICS & GYNECOLOGY

## 2020-02-02 PROCEDURE — 700111 HCHG RX REV CODE 636 W/ 250 OVERRIDE (IP): Performed by: OBSTETRICS & GYNECOLOGY

## 2020-02-02 PROCEDURE — A6250 SKIN SEAL PROTECT MOISTURIZR: HCPCS | Performed by: OBSTETRICS & GYNECOLOGY

## 2020-02-02 PROCEDURE — A9270 NON-COVERED ITEM OR SERVICE: HCPCS | Performed by: OBSTETRICS & GYNECOLOGY

## 2020-02-02 PROCEDURE — 700102 HCHG RX REV CODE 250 W/ 637 OVERRIDE(OP): Performed by: STUDENT IN AN ORGANIZED HEALTH CARE EDUCATION/TRAINING PROGRAM

## 2020-02-02 PROCEDURE — A9270 NON-COVERED ITEM OR SERVICE: HCPCS | Performed by: STUDENT IN AN ORGANIZED HEALTH CARE EDUCATION/TRAINING PROGRAM

## 2020-02-02 RX ORDER — LABETALOL 100 MG/1
200 TABLET, FILM COATED ORAL TWICE DAILY
Status: DISCONTINUED | OUTPATIENT
Start: 2020-02-02 | End: 2020-02-03 | Stop reason: HOSPADM

## 2020-02-02 RX ADMIN — ENOXAPARIN SODIUM 30 MG: 100 INJECTION SUBCUTANEOUS at 22:13

## 2020-02-02 RX ADMIN — ENOXAPARIN SODIUM 30 MG: 100 INJECTION SUBCUTANEOUS at 09:08

## 2020-02-02 RX ADMIN — FLUOXETINE 10 MG: 10 CAPSULE ORAL at 05:29

## 2020-02-02 RX ADMIN — OXYCODONE AND ACETAMINOPHEN 1 TABLET: 5; 325 TABLET ORAL at 13:47

## 2020-02-02 RX ADMIN — LABETALOL HYDROCHLORIDE 200 MG: 100 TABLET, FILM COATED ORAL at 09:07

## 2020-02-02 RX ADMIN — IBUPROFEN 600 MG: 600 TABLET ORAL at 00:10

## 2020-02-02 RX ADMIN — IBUPROFEN 600 MG: 600 TABLET ORAL at 22:18

## 2020-02-02 RX ADMIN — OXYCODONE AND ACETAMINOPHEN 1 TABLET: 5; 325 TABLET ORAL at 00:10

## 2020-02-02 RX ADMIN — OXYCODONE AND ACETAMINOPHEN 1 TABLET: 5; 325 TABLET ORAL at 22:18

## 2020-02-02 RX ADMIN — VITAMIN A, VITAMIN C, VITAMIN D-3, VITAMIN E, VITAMIN B-1, VITAMIN B-2, NIACIN, VITAMIN B-6, CALCIUM, IRON, ZINC, COPPER 1 TABLET: 4000; 120; 400; 22; 1.84; 3; 20; 10; 1; 12; 200; 27; 25; 2 TABLET ORAL at 05:29

## 2020-02-02 RX ADMIN — IBUPROFEN 600 MG: 600 TABLET ORAL at 13:47

## 2020-02-02 RX ADMIN — OXYCODONE AND ACETAMINOPHEN 1 TABLET: 5; 325 TABLET ORAL at 17:30

## 2020-02-02 RX ADMIN — IBUPROFEN 600 MG: 600 TABLET ORAL at 05:29

## 2020-02-02 RX ADMIN — LABETALOL HYDROCHLORIDE 200 MG: 100 TABLET, FILM COATED ORAL at 17:30

## 2020-02-02 RX ADMIN — OXYCODONE AND ACETAMINOPHEN 1 TABLET: 5; 325 TABLET ORAL at 09:08

## 2020-02-02 ASSESSMENT — EDINBURGH POSTNATAL DEPRESSION SCALE (EPDS)
I HAVE FELT SAD OR MISERABLE: NO, NOT AT ALL
I HAVE BEEN ANXIOUS OR WORRIED FOR NO GOOD REASON: HARDLY EVER
I HAVE BEEN SO UNHAPPY THAT I HAVE BEEN CRYING: NO, NEVER
I HAVE BEEN SO UNHAPPY THAT I HAVE HAD DIFFICULTY SLEEPING: NOT AT ALL
I HAVE BEEN ABLE TO LAUGH AND SEE THE FUNNY SIDE OF THINGS: AS MUCH AS I ALWAYS COULD
THINGS HAVE BEEN GETTING ON TOP OF ME: NO, I HAVE BEEN COPING AS WELL AS EVER
I HAVE FELT SCARED OR PANICKY FOR NO GOOD REASON: NO, NOT AT ALL
I HAVE BLAMED MYSELF UNNECESSARILY WHEN THINGS WENT WRONG: NOT VERY OFTEN
THE THOUGHT OF HARMING MYSELF HAS OCCURRED TO ME: NEVER
I HAVE LOOKED FORWARD WITH ENJOYMENT TO THINGS: AS MUCH AS I EVER DID

## 2020-02-02 NOTE — LACTATION NOTE
This note was copied from a baby's chart.  LC returned to the room to observe baby while BF. Baby open her mouth wide and willingly latches. Has a deep asymmetric latching. Baby stays on for about 15 mintues. Mom denies any pain. Encouraged to call LC for any additional assistance if needed.

## 2020-02-02 NOTE — PROGRESS NOTES
Post Partum Progress Note    Name:   Sonya Flor   Date/Time:  2020 - 6:36 AM  Chief Admitting Dx:  Pregnancy  Labor and delivery indication for care or intervention  Delivery Type:   for repeat  Post-Op/Post Partum Days #:  2    Subjective:  Abdominal pain: yes  Ambulating:   yes  Tolerating liquids:  yes  Tolerating food:  yes common adult  Flatus:   yes  BM:    no  Bleeding:   with a small amount of bleeding  Voiding:   yes  Dizziness:   no  Feeding:   breast    Vitals:    20 1800 20 2200 20 0200 20 0600   BP: 136/80 124/64 121/71 120/69   Pulse: 87 87 82 78   Resp:    Temp: 36.5 °C (97.7 °F) 36.8 °C (98.2 °F) 35.9 °C (96.7 °F) 36.2 °C (97.1 °F)   TempSrc: Temporal Temporal Temporal Temporal   SpO2: 100% 96% 94% 94%   Weight:       Height:           Exam:  Breast: Lactating yes  Abdomen: Abdomen soft, obese, non-tender. BS normal. No masses,  No organomegaly  Fundal Tenderness:  yes  Fundus Firm: yes  Incision: dressing dry and intact  Below umbilicus: yes  Perineum: perineum intact  Lochia: mild  Extremities: 1+ edema extremities, peripheral pulses and reflexes normal, Homans sign is negative, no sign of DVT    Meds:  Current Facility-Administered Medications   Medication Dose   • lactated ringers infusion     • LR infusion     • PRN oxytocin (PITOCIN) (20 Units/1000 mL) PRN for excessive uterine bleeding - See Admin Instr  125-999 mL/hr   • miSOPROStol (CYTOTEC) tablet 600 mcg  600 mcg   • simethicone (MYLICON) chewable tab 80 mg  80 mg   • prenatal plus vitamin (STUARTNATAL 1+1) 27-1 MG tablet 1 Tab  1 Tab   • ibuprofen (MOTRIN) tablet 600 mg  600 mg   • acetaminophen (TYLENOL) tablet 325 mg  325 mg   • oxyCODONE-acetaminophen (PERCOCET) 5-325 MG per tablet 1 Tab  1 Tab   • oxyCODONE immediate release (ROXICODONE) tablet 10 mg  10 mg   • morphine (pf) 4 MG/ML injection 4 mg  4 mg   • ondansetron (ZOFRAN) syringe/vial injection 4 mg  4 mg    Or   •  ondansetron (ZOFRAN ODT) dispertab 4 mg  4 mg   • diphenhydrAMINE (BENADRYL) tablet/capsule 25 mg  25 mg    Or   • diphenhydrAMINE (BENADRYL) injection 25 mg  25 mg   • oxytocin (PITOCIN) 20 UNITS/1000ML LR (postpartum)   mL/hr   • enoxaparin (LOVENOX) inj 30 mg  30 mg   • FLUoxetine (PROZAC) capsule 10 mg  10 mg   • labetalol (NORMODYNE) tablet 200 mg  200 mg       Labs:   Recent Labs     20  2041 20  0811 20  1828   WBC 14.6* 15.9* 16.8*   RBC 4.36 4.18* 4.23   HEMOGLOBIN 11.8* 11.5* 11.5*   HEMATOCRIT 36.6* 34.8* 35.2*   MCV 83.9 83.3 83.2   MCH 27.1 27.5 27.2   MCHC 32.2* 33.0* 32.7*   RDW 40.9 39.4 40.3   PLATELETCT 273 276 314   MPV 9.2 9.4 9.3       Assessment:  Chief Admitting Dx:  Pregnancy, gestational htn  BPs normalizing, on labetalol 200 mg bid  Delivery Type:   for repeat  Tubal Ligation:  No  RH neg - S/P Rhogam injection  No signs of depression    Plan:  Continue routine post partum care.  Anticipate D/C POD #3  Undecided on contraception  Continue Lovenox for DVT prophylaxis    Mimi Sanchez M.D.

## 2020-02-02 NOTE — PROGRESS NOTES
Spoke with Jennifer MCKAY and informed her of pt /69 and HR 78. Orders are to hold Labetalol at this time and recheck her BP in 2 hours. If her BP is close to holding parameters please call MD for orders.

## 2020-02-02 NOTE — PROGRESS NOTES
Called Dr. Sanchez and updated in regards to patient's low urine output. Patient has drank about 3 liters of fluids and still not making adequate urine output. MD ordered stat CBC and BMP. Call MD with abnormal results. Re-start IV and run 500ml of bolus of LR and then 125ml/h.

## 2020-02-02 NOTE — LACTATION NOTE
This note was copied from a baby's chart.  Multip MOB first time breastfeeding-She had just finished BF both breast and giving 7ml ABM with bottle when I arrived. Baby is sleeping. She denies any breast/nipple pain or discomfort with BF. Discussed feeding positions, frequency, paced bottle feeding. Provided written educational materials and feeding volume GL for supplemented amounts. Has friends/family bedside. Encouraged to come for BF [place] once outpatient. Encouraged Mom to call LC for next feeding so I can observe and assist.

## 2020-02-02 NOTE — PROGRESS NOTES
Spoke with Jennifer Arias by phone and informed her of pt urine output of 300 mL since 1900 and urine looking lighter in color. The pt CBC and BMP results are in and Jennifer has reviewed then. Jennifer states it is ok to take out booker catheter. No new orders received.

## 2020-02-02 NOTE — CARE PLAN
Problem: Potential for postpartum infection related to surgical incision, compromised uterine condition, urinary tract or respiratory compromise  Goal: Patient will be afebrile and free from signs and symptoms of infection  Outcome: PROGRESSING AS EXPECTED  Note:   Pt has been afebrile, VSS. Will continue to monitor VS Q4H. Pt is receiving labetalol for HTN. Pt BP has been WDL.      Problem: Alteration in comfort related to surgical incision and/or after birth pains  Goal: Patient is able to ambulate, care for self and infant with acceptable pain level  Outcome: PROGRESSING AS EXPECTED  Note:   Pt is ambulating with a steady gait. Pt is able to get up from bed and walk to the bathroom on her own, no assistance required.
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
## 2020-02-03 ENCOUNTER — ANESTHESIA (OUTPATIENT)
Dept: OBGYN | Facility: MEDICAL CENTER | Age: 40
End: 2020-02-03
Payer: MEDICAID

## 2020-02-03 VITALS
SYSTOLIC BLOOD PRESSURE: 156 MMHG | DIASTOLIC BLOOD PRESSURE: 86 MMHG | HEIGHT: 65 IN | HEART RATE: 70 BPM | WEIGHT: 293 LBS | OXYGEN SATURATION: 94 % | BODY MASS INDEX: 48.82 KG/M2 | TEMPERATURE: 97.5 F | RESPIRATION RATE: 18 BRPM

## 2020-02-03 PROCEDURE — 700102 HCHG RX REV CODE 250 W/ 637 OVERRIDE(OP): Performed by: OBSTETRICS & GYNECOLOGY

## 2020-02-03 PROCEDURE — 700102 HCHG RX REV CODE 250 W/ 637 OVERRIDE(OP): Performed by: STUDENT IN AN ORGANIZED HEALTH CARE EDUCATION/TRAINING PROGRAM

## 2020-02-03 PROCEDURE — A9270 NON-COVERED ITEM OR SERVICE: HCPCS | Performed by: OBSTETRICS & GYNECOLOGY

## 2020-02-03 PROCEDURE — 700111 HCHG RX REV CODE 636 W/ 250 OVERRIDE (IP): Performed by: OBSTETRICS & GYNECOLOGY

## 2020-02-03 PROCEDURE — A9270 NON-COVERED ITEM OR SERVICE: HCPCS | Performed by: STUDENT IN AN ORGANIZED HEALTH CARE EDUCATION/TRAINING PROGRAM

## 2020-02-03 RX ORDER — IBUPROFEN 600 MG/1
600 TABLET ORAL EVERY 6 HOURS PRN
Qty: 30 TAB | Refills: 0 | Status: SHIPPED | OUTPATIENT
Start: 2020-02-03 | End: 2021-07-20

## 2020-02-03 RX ORDER — LABETALOL 200 MG/1
200 TABLET, FILM COATED ORAL 2 TIMES DAILY
Qty: 60 TAB | Refills: 0 | Status: SHIPPED | OUTPATIENT
Start: 2020-02-03 | End: 2020-03-17 | Stop reason: SDUPTHER

## 2020-02-03 RX ORDER — HYDROCODONE BITARTRATE AND ACETAMINOPHEN 5; 325 MG/1; MG/1
1-2 TABLET ORAL EVERY 4 HOURS PRN
Qty: 20 TAB | Refills: 0 | Status: SHIPPED | OUTPATIENT
Start: 2020-02-03 | End: 2020-02-13

## 2020-02-03 RX ORDER — ACETAMINOPHEN AND CODEINE PHOSPHATE 120; 12 MG/5ML; MG/5ML
1 SOLUTION ORAL DAILY
Qty: 84 TAB | Refills: 4 | Status: SHIPPED | OUTPATIENT
Start: 2020-02-03 | End: 2021-07-20

## 2020-02-03 RX ADMIN — IBUPROFEN 600 MG: 600 TABLET ORAL at 05:36

## 2020-02-03 RX ADMIN — IBUPROFEN 600 MG: 600 TABLET ORAL at 11:36

## 2020-02-03 RX ADMIN — VITAMIN A, VITAMIN C, VITAMIN D-3, VITAMIN E, VITAMIN B-1, VITAMIN B-2, NIACIN, VITAMIN B-6, CALCIUM, IRON, ZINC, COPPER 1 TABLET: 4000; 120; 400; 22; 1.84; 3; 20; 10; 1; 12; 200; 27; 25; 2 TABLET ORAL at 05:20

## 2020-02-03 RX ADMIN — LABETALOL HYDROCHLORIDE 200 MG: 100 TABLET, FILM COATED ORAL at 05:20

## 2020-02-03 RX ADMIN — OXYCODONE HYDROCHLORIDE 10 MG: 10 TABLET ORAL at 14:47

## 2020-02-03 RX ADMIN — FLUOXETINE 10 MG: 10 CAPSULE ORAL at 05:33

## 2020-02-03 RX ADMIN — ENOXAPARIN SODIUM 30 MG: 100 INJECTION SUBCUTANEOUS at 10:10

## 2020-02-03 RX ADMIN — OXYCODONE HYDROCHLORIDE 10 MG: 10 TABLET ORAL at 10:10

## 2020-02-03 RX ADMIN — OXYCODONE HYDROCHLORIDE 10 MG: 10 TABLET ORAL at 05:37

## 2020-02-03 NOTE — CARE PLAN
Problem: Communication  Goal: The ability to communicate needs accurately and effectively will improve  Outcome: PROGRESSING AS EXPECTED  Note:   Patient has been ambulating well, she took shower, incision dressing is off and she has stapes open to air, inter dry was given to place under the skin folds to keep incision area dry. Patient has been breastfeeding infant and supplementing with formula. All questions and concerns answered.      Problem: Pain Management  Goal: Pain level will decrease to patient's comfort goal  Outcome: PROGRESSING AS EXPECTED  Note:   Patient calls for PRN pain meds as needed.

## 2020-02-03 NOTE — DISCHARGE SUMMARY
SECTION DISCHARGE SUMMARY    PATIENT ID:  NAME:  Sonya Flor  MRN:               [id number]  YOB: 1980  DATE OF ADMISSION: 2020   DATE OF DISCHARGE:2/3/2020     DISCHARGE DIAGNOSES:  1. Intrauterine gestation at term.  2. Delivered viable female infant.    PROCEDURES PERFORMED:  1. Repeat low transverse  section.    COMPLICATIONS: None.    HOSPITAL COURSE: This is a 39 y.o. year-old female  7, now para 5025, who was admitted at 38w5d for repeat  section. Pregnancy was complicated by morbid obesity,elevated downs risk, HTN, Hx of alcohol use disorder. Her  course was uncomplicated.  [facility] Informed consent was obtained. Under spinal anesthesia an uncomplicated repeat low transverse  section was performed. A viable female infant with Apgars of 6 and 9 was delivered. The patient's postoperative course was uneventful. She remained afebrile with stable vital signs. Pt has been ambulating, tolerating a regular diet and has had return of normal GI function. Pain has been well controlled with Ibuprofen and percocet. The wound is healing nicely. Prior to discharge staples were removed and steri strips were placed.    LABS:   Recent Labs     20  2041 20  0811 20  1828   WBC 14.6* 15.9* 16.8*   RBC 4.36 4.18* 4.23   HEMOGLOBIN 11.8* 11.5* 11.5*   HEMATOCRIT 36.6* 34.8* 35.2*   MCV 83.9 83.3 83.2   MCH 27.1 27.5 27.2   RDW 40.9 39.4 40.3   PLATELETCT 273 276 314   MPV 9.2 9.4 9.3   NEUTSPOLYS 74.60*  --  71.30   LYMPHOCYTES 18.70*  --  20.00*   MONOCYTES 4.50  --  7.00   EOSINOPHILS 1.20  --  0.70   BASOPHILS 0.40  --  0.40        Sonya Flor   Home Medication Instructions LITTLE:[id number]    Printed on:20 0832   Medication Information                      aspirin (ASPIRIN LOW DOSE) 81 MG Chew Tab chewable tablet  Take 1 Tab by mouth every day.             FLUoxetine (PROZAC) 10 MG Cap  Take 1 Cap  by mouth every day.             HYDROcodone-acetaminophen (NORCO) 5-325 MG Tab per tablet  Take 1-2 Tabs by mouth every four hours as needed for up to 10 days.             hydrOXYzine HCl (ATARAX) 25 MG Tab  Take 1 Tab by mouth 3 times a day as needed for Anxiety. FOR ANXIETY             ibuprofen (MOTRIN) 600 MG Tab  Take 1 Tab by mouth every 6 hours as needed (For cramping after delivery; do not give if patient is receiving ketorolac (Toradol)).             labetalol (NORMODYNE) 200 MG Tab  Take 1 Tab by mouth 2 Times a Day.             norethindrone (MICRONOR) 0.35 MG tablet  Take 1 Tab by mouth every day.             Prenatal Vit-Fe Fumarate-FA (PRENATAL 1+1 PO)  Take  by mouth.                 CONDITION: Stable.    DISPOSITION: Home.    ACTIVITY: Slow increase as tolerated. No lifting heavier than baby. Strict pelvic rest, no intercourse or any object inserted into vagina x 6 weeks.    DIET:  Regular    FOLLOW UP:   1) [facility] and also in 4-6 weeks.

## 2020-02-03 NOTE — PROGRESS NOTES
Assessment done. Vital signs stable. Patient voiding without difficulty, claims to be passing flatus. Fundus firm at umbilicus with light lochia. Clips over low abdominal incision clean, dry, and intact. No redness, swelling, or drainage noted. Skin well approximated. Patient ambulating with steady gait. Patient claims to have good pain relief with p.o meds. Breastfeeding infant on demand. POC discussed. Pt claims she will call for medications or any needs

## 2020-02-03 NOTE — DISCHARGE INSTRUCTIONS
POSTPARTUM DISCHARGE INSTRUCTIONS FOR MOM    YOB: 1980   Age: 39 y.o.               Admit Date: 2020     Discharge Date: 2/3/2020  Attending Doctor:  Juliano Frost M.D.                  Allergies:  Patient has no known allergies.    Discharged to home by car. Discharged via wheelchair, hospital escort: Yes.  Special equipment needed: Not Applicable  Belongings with: Personal  Be sure to schedule a follow-up appointment with your primary care doctor or any specialists as instructed.     Discharge Plan:   Diet Plan: Discussed  Activity Level: Discussed  Confirmed Follow up Appointment: Patient to Call and Schedule Appointment  Medication Reconciliation Updated: Yes  Influenza Vaccine Indication: Not indicated: Previously immunized this influenza season and > 8 years of age    REASONS TO CALL YOUR OBSTETRICIAN:  1.   Persistent fever or shaking chills (Temperature higher than 100.4)  2.   Heavy bleeding (soaking more than 1 pad per hour); Passing clots  3.   Foul odor from vagina  4.   Mastitis (Breast infection; breast pain, chills, fever, redness)  5.   Urinary pain, burning or frequency  6.   Episiotomy infection  7.   Abdominal incision infection  8.   Severe depression longer than 24 hours    HAND WASHING  · Prior to handling the baby.  · Before breastfeeding or bottle feeding baby.  · After using the bathroom or changing the baby's diaper.    WOUND CARE  Ask your physician for additional care instructions.  In general:    ·  Incision:      · Keep clean and dry.    · Do NOT lift anything heavier than your baby for up to 6 weeks.    · There should not be any opening or pus.      VAGINAL CARE  · Nothing inside vagina for 6 weeks: no sexual intercourse, tampons or douching.  · Bleeding may continue for 2-4 weeks.  Amount may vary.    · Call your physician for heavy bleeding which means soaking more than 1 pad per hour    BIRTH CONTROL  · It is possible to become pregnant at any time after  "delivery and while breastfeeding.  · Plan to discuss a method of birth control with your physician at your follow up visit. visit.    DIET AND ELIMINATION  · Eating more fiber (bran cereal, fruits, and vegetables) and drinking plenty of fluids will help to avoid constipation.  · Urinary frequency after childbirth is normal.    POSTPARTUM BLUES  During the first few days after birth, you may experience a sense of the \"blues\" which may include impatience, irritability or even crying.  These feeling come and go quickly.  However, as many as 1 in 10 women experience emotional symptoms known as postpartum depression.    Postpartum depression:  May start as early as the second or third day after delivery or take several weeks or months to develop.  Symptoms of \"blues\" are present, but are more intense:  Crying spells; loss of appetite; feelings of hopelessness or loss of control; fear of touching the baby; over concern or no concern at all about the baby; little or no concern about your own appearance/caring for yourself; and/or inability to sleep or excessive sleeping.  Contact your physician if you are experiencing any of these symptoms.    Crisis Hotline:  · [place] Crisis Hotline:  1-758-LYCHQNC  Or 1-116.631.5625  · [place] Crisis Hotline:  1-908.101.2575  Or 622-962-8665    PREVENTING SHAKEN BABY:  If you are angry or stressed, PUT THE BABY IN THE CRIB, step away, take some deep breaths, and wait until you are calm to care for the baby.  DO NOT SHAKE THE BABY.  You are not alone, call a supporter for help.    · Crisis Call Center 24/7 crisis line 236-240-1394 or 1-231.842.5575  · You can also text them, text \"ANSWER\" to 070774    DEPRESSION / SUICIDE RISK:  As you are discharged from this [facility] facility, it is important to learn how to keep safe from harming yourself.    Recognize the warning signs:  · Abrupt changes in personality, positive or negative- including increase in energy   · Giving away " possessions  · Change in eating patterns- significant weight changes-  positive or negative  · Change in sleeping patterns- unable to sleep or sleeping all the time   · Unwillingness or inability to communicate  · Depression  · Unusual sadness, discouragement and loneliness  · Talk of wanting to die  · Neglect of personal appearance   · Rebelliousness- reckless behavior  · Withdrawal from people/activities they love  · Confusion- inability to concentrate     If you or a loved one observes any of these behaviors or has concerns about self-harm, here's what you can do:  · Talk about it- your feelings and reasons for harming yourself  · Remove any means that you might use to hurt yourself (examples: pills, rope, extension cords, firearm)  · Get professional help from the community (Mental Health, Substance Abuse, psychological counseling)  · Do not be alone:Call your Safe Contact- someone whom you trust who will be there for you.  · Call your local CRISIS HOTLINE 190-1979 or 748-518-7612  · Call your local Children's Mobile Crisis Response Team [place] (448) 434-9295 or www[employer]  · Call the toll free National Suicide Prevention Hotlines   · National Suicide Prevention Lifeline 286-748-YUNX (3229)  · National Hope Line Network 800-SUICIDE (975-6157)    DISCHARGE SURVEY:  Thank you for choosing [facility].  We hope we provided you with very good care.  You may be receiving a survey in the mail.  Please fill it out.  Your opinion is valuable to us.    ADDITIONAL EDUCATIONAL MATERIALS GIVEN TO PATIENT:        My signature on this form indicates that:  1.  I have reviewed and understand the above information  2.  My questions regarding this information have been answered to my satisfaction.  3.  I have formulated a plan with my discharge nurse to obtain my prescribed medication for home.

## 2020-02-03 NOTE — LACTATION NOTE
This note was copied from a baby's chart.  Mother reports that she is breastfeeding her  without difficulty or discomfort. Resources for out-patient support discussed.  Provided the breast feeding code for her [employer] enid. Educational videos.

## 2020-02-03 NOTE — PROGRESS NOTES
2000 pt  doing well bonding with baby, Assessment done wound open to air clean and dry with staples to skin, Pt denies pain at this time, Encourage  More ambulation, Needs attended.

## 2020-02-18 ENCOUNTER — POST PARTUM (OUTPATIENT)
Dept: OBGYN | Facility: CLINIC | Age: 40
End: 2020-02-18
Payer: MEDICAID

## 2020-02-18 VITALS — WEIGHT: 293 LBS | BODY MASS INDEX: 61.74 KG/M2

## 2020-02-18 PROCEDURE — 99024 POSTOP FOLLOW-UP VISIT: CPT | Performed by: OBSTETRICS & GYNECOLOGY

## 2020-02-18 ASSESSMENT — EDINBURGH POSTNATAL DEPRESSION SCALE (EPDS)
I HAVE BEEN SO UNHAPPY THAT I HAVE HAD DIFFICULTY SLEEPING: NOT AT ALL
I HAVE BEEN SO UNHAPPY THAT I HAVE BEEN CRYING: ONLY OCCASIONALLY
THINGS HAVE BEEN GETTING ON TOP OF ME: NO, I HAVE BEEN COPING AS WELL AS EVER
TOTAL SCORE: 1
I HAVE BLAMED MYSELF UNNECESSARILY WHEN THINGS WENT WRONG: NO, NEVER
I HAVE BEEN ABLE TO LAUGH AND SEE THE FUNNY SIDE OF THINGS: AS MUCH AS I ALWAYS COULD
I HAVE LOOKED FORWARD WITH ENJOYMENT TO THINGS: AS MUCH AS I EVER DID
I HAVE BEEN ANXIOUS OR WORRIED FOR NO GOOD REASON: NO, NOT AT ALL
I HAVE FELT SCARED OR PANICKY FOR NO GOOD REASON: NO, NOT AT ALL
I HAVE FELT SAD OR MISERABLE: NO, NOT AT ALL
THE THOUGHT OF HARMING MYSELF HAS OCCURRED TO ME: NEVER

## 2020-02-18 NOTE — PROGRESS NOTES
Pt. here for C/S check delivered on 1/31/20   Currently :both breast and bottle feeding  Pt. States feels great   Post partum visit not scheduled yet  Chaperone offered provided

## 2020-02-18 NOTE — PROGRESS NOTES
Incision Check    CC: s/p c/s incision check    HPI: 39 y.o.  s/p repeat  delivery on 2020 with Dr. Frost for repeat w/ increasing Bps in setting of cHTN here for incision check.   On labetalol 200mg BID.  Pt reports doing well with minimal pain.  No concerns today  BFing which is going well.     Denies concerns about mood.   EDPDS:1    Wt (!) 168.3 kg (371 lb)   LMP 2019 (Exact Date)   BMI 61.74 kg/m²   Gen: AAO, NAD  Abd: soft, NT, ND, incision C/D/I, healing well    A/P: 39 y.o.  s/p c/s on 2020 doing well, hx of cHTN  - no signs of postop complications  - encouraged BFing  - no signs of PP depression  - cHTN: cont 200mg labetalol BID for now; recommend to make appt with PCP asap for long term management of cHTN.  - contraception: declines currently, reports shedoesn't have a partner.    RTC for routine postpartum visit in approx 3-4wks    Miladis Barrett MD  [facility], Women's Health

## 2020-03-17 ENCOUNTER — TELEPHONE (OUTPATIENT)
Dept: OBGYN | Facility: CLINIC | Age: 40
End: 2020-03-17

## 2020-03-17 RX ORDER — LABETALOL 200 MG/1
200 TABLET, FILM COATED ORAL 2 TIMES DAILY
Qty: 60 TAB | Refills: 2 | Status: SHIPPED | OUTPATIENT
Start: 2020-03-17 | End: 2020-04-23

## 2020-03-17 NOTE — TELEPHONE ENCOUNTER
Pt called requesting refill for labetalol. States ran out and now BP is high. Dr. Fagan sent in rx and advised pt also make an appt with PCP.

## 2020-04-23 RX ORDER — LABETALOL 200 MG/1
200 TABLET, FILM COATED ORAL 2 TIMES DAILY
Qty: 90 TAB | Refills: 2 | Status: SHIPPED | OUTPATIENT
Start: 2020-04-23 | End: 2020-07-24

## 2020-04-29 RX ORDER — HYDROXYZINE HYDROCHLORIDE 25 MG/1
TABLET, FILM COATED ORAL
Qty: 90 TAB | Refills: 1 | Status: SHIPPED | OUTPATIENT
Start: 2020-04-29 | End: 2020-05-15

## 2020-05-15 RX ORDER — HYDROXYZINE HYDROCHLORIDE 25 MG/1
TABLET, FILM COATED ORAL
Qty: 270 TAB | Refills: 1 | Status: SHIPPED | OUTPATIENT
Start: 2020-05-15 | End: 2020-06-09

## 2020-06-09 RX ORDER — HYDROXYZINE HYDROCHLORIDE 25 MG/1
TABLET, FILM COATED ORAL
Qty: 90 TAB | Refills: 1 | Status: SHIPPED | OUTPATIENT
Start: 2020-06-09 | End: 2020-11-09

## 2020-07-24 RX ORDER — LABETALOL 200 MG/1
TABLET, FILM COATED ORAL
Qty: 60 TAB | Refills: 0 | Status: SHIPPED | OUTPATIENT
Start: 2020-07-24 | End: 2021-07-20

## 2020-07-25 NOTE — TELEPHONE ENCOUNTER
Patient was told in February that she needs to go to her PCP for long term management of her cHTN.  I will fill for 1 month but needs to be seen by her PCP.    [place]

## 2020-11-09 RX ORDER — HYDROXYZINE HYDROCHLORIDE 25 MG/1
TABLET, FILM COATED ORAL
Qty: 270 TAB | Refills: 1 | Status: SHIPPED | OUTPATIENT
Start: 2020-11-09 | End: 2022-04-27 | Stop reason: SDUPTHER

## 2021-04-23 ENCOUNTER — TELEPHONE (OUTPATIENT)
Dept: OBGYN | Facility: CLINIC | Age: 41
End: 2021-04-23

## 2021-04-23 ENCOUNTER — GYNECOLOGY VISIT (OUTPATIENT)
Dept: OBGYN | Facility: CLINIC | Age: 41
End: 2021-04-23
Payer: MEDICAID

## 2021-04-23 VITALS
HEIGHT: 65 IN | BODY MASS INDEX: 48.82 KG/M2 | WEIGHT: 293 LBS | SYSTOLIC BLOOD PRESSURE: 134 MMHG | DIASTOLIC BLOOD PRESSURE: 92 MMHG

## 2021-04-23 DIAGNOSIS — O09.529 ANTEPARTUM MULTIGRAVIDA OF ADVANCED MATERNAL AGE: ICD-10-CM

## 2021-04-23 DIAGNOSIS — O99.210 OBESITY IN PREGNANCY: ICD-10-CM

## 2021-04-23 DIAGNOSIS — O10.919 CHRONIC HYPERTENSION AFFECTING PREGNANCY: ICD-10-CM

## 2021-04-23 PROCEDURE — 76830 TRANSVAGINAL US NON-OB: CPT | Performed by: OBSTETRICS & GYNECOLOGY

## 2021-04-23 PROCEDURE — 99213 OFFICE O/P EST LOW 20 MIN: CPT | Mod: 25 | Performed by: OBSTETRICS & GYNECOLOGY

## 2021-04-23 RX ORDER — ONDANSETRON 4 MG/1
4 TABLET, ORALLY DISINTEGRATING ORAL EVERY 6 HOURS PRN
Qty: 30 TABLET | Refills: 0 | Status: SHIPPED | OUTPATIENT
Start: 2021-04-23 | End: 2021-07-20

## 2021-04-23 ASSESSMENT — FIBROSIS 4 INDEX: FIB4 SCORE: 0.37

## 2021-04-23 NOTE — NON-PROVIDER
Patient here for GYN/DUB.  UPT=positive  LMP=1/30/21. exact  EDDIE=11/6/21  GA=11w6d  Last pap: 8/2017=negative  Phone number: [phone]  Pharmacy verified  C/o n/v. Would like Rx for Zofran.

## 2021-04-23 NOTE — PROGRESS NOTES
Cc: Confirmation of pregnancy    HPI:  The patient is a 40 y.o.  here for confirmation of pregnancy exam.  Patient states that she took her previous dose of labetalol but felt dizzy and lightheaded.    Fetal movement denies   Vaginal bleeding denies    Leakage of fluid denies    Cramping denies   Nausea/vomiting: reports   HA  denies   Dysuria  denies     Review of systems:  Pertinent positives documented in HPI and all other systems reviewed & are negative    Gyn History: LMP 2021. Periods regular monthly, lasts 4 days    Pregnancy related complications:    OB History    Para Term  AB Living   7 5 5   2 5   SAB TAB Ectopic Molar Multiple Live Births     2     0 5      # Outcome Date GA Lbr Sergio/2nd Weight Sex Delivery Anes PTL Lv   7 Term 20 38w5d  3.44 kg (7 lb 9.3 oz) F CS-LTranv EPI N SARAH      Birth Comments: had amnio in 2nd TM, no abnormalities.   6 TAB  6w0d          5 Term 03 40w0d  3.402 kg (7 lb 8 oz) F CS-Unspec   SARAH      Complications: Failure to Progress in Second Stage   4 TAB  5w0d          3 Term 00 40w0d  3.345 kg (7 lb 6 oz) M Vag-Spont   SARAH   2 Term 99 40w0d  3.289 kg (7 lb 4 oz) M Vag-Spont  N SARAH   1 Term 96 40w0d  4.082 kg (9 lb) M Vag-Spont  N SARAH     Past Medical History:   Diagnosis Date   • Anxiety    • Dyspnea    • Hypertension    • MDD (major depressive disorder)    • Postpartum depression    • Substance abuse (HCC)      Past Surgical History:   Procedure Laterality Date   • REPEAT C SECTION Bilateral 2020    Procedure:  SECTION, REPEAT;  Surgeon: Juliano Frost M.D.;  Location: LABOR AND DELIVERY;  Service: Labor and Delivery   • GASTRIC RESECTION     • CHOLECYSTECTOMY         • PRIMARY C SECTION           Social History     Socioeconomic History   • Marital status: Single     Spouse name: Not on file   • Number of children: Not on file   • Years of education: Not on file   • Highest education  "level: Not on file   Occupational History   • Not on file   Tobacco Use   • Smoking status: Former Smoker     Packs/day: 0.00     Quit date: 3/1/2019     Years since quittin.1   • Smokeless tobacco: Never Used   Substance and Sexual Activity   • Alcohol use: No   • Drug use: No   • Sexual activity: Yes     Partners: Male   Other Topics Concern   • Not on file   Social History Narrative   • Not on file     Social Determinants of Health     Financial Resource Strain:    • Difficulty of Paying Living Expenses:    Food Insecurity:    • Worried About Running Out of Food in the Last Year:    • Ran Out of Food in the Last Year:    Transportation Needs:    • Lack of Transportation (Medical):    • Lack of Transportation (Non-Medical):    Physical Activity:    • Days of Exercise per Week:    • Minutes of Exercise per Session:    Stress:    • Feeling of Stress :    Social Connections:    • Frequency of Communication with Friends and Family:    • Frequency of Social Gatherings with Friends and Family:    • Attends Restorationism Services:    • Active Member of Clubs or Organizations:    • Attends Club or Organization Meetings:    • Marital Status:    Intimate Partner Violence:    • Fear of Current or Ex-Partner:    • Emotionally Abused:    • Physically Abused:    • Sexually Abused:      Family History   Problem Relation Age of Onset   • Alcohol/Drug Mother    • Psychiatric Illness Mother    • Diabetes Mother    • Heart Disease Father 50        stents   • Diabetes Maternal Grandmother    • Heart Disease Paternal Grandfather    • Bipolar disorder Sister    • Schizophrenia Sister    • Alcohol/Drug Sister    • Psychiatric Illness Paternal Grandmother    • Schizophrenia Paternal Grandmother      Allergies:   Allergies as of 2021   • (No Known Allergies)       PE:    /92 (BP Location: Left arm, Patient Position: Sitting)   Ht 1.651 m (5' 5\")   Wt (!) 156 kg (345 lb)       General:appears stated age, is in no apparent " distress, is well developed and well nourished  Head: normocephalic, non-tender  Neck: neck is supple, no jugular venous distension  Abdomen: Bowel sounds positive, nondistended, soft, nontender x4, no rebound or guarding. No organomegaly. No masses.  Female GYN: normal female external genitalia without lesions     Skin: No rashes, or ulcers or lesions seen  Psychiatric: Patient shows appropriate affect, is alert and oriented x3, intact judgment and insight.    transvaginal scan and per my read:    Indication: missed menses, positive pregnancy test.   LMP 2021 making her EDDIE 2021    Findings: kearney intrauterine pregnancy @ 11-3 weeks by CRL. Positive yolk sac. Positive fetal cardiac activity. Right ovary not seen. Left Ovary not seen. Cervical length 3.22cm.   No free fluid in the cul-de-sac.    Impression: viable IUP @ 11-3 weeks. EDC by US of 2021    DATIN2021 by LMP c/w todays US as per ACOG guidelines.    A/P:   1. Chronic hypertension affecting pregnancy  URINETOTAL PROTEIN 24 HR       # Newly diagnosed pregnancy  Screening options for Down Syndrome and neural tube defects discussed.  Patient will be seeing [facility] for tesitng. Hx amnio with previous child.   SAB precautions discussed  Increase water intake and encouraged healthy nutrition.  Begin prenatal vitamins.    #CHTN  - baseline PEC labs and 24 hour urine today  - no need for labetalol at this time.    #Obesity in pregnancy: no hx of GDM with any pregnancy  - A1C with blood work  - discussed healthy weight gain in setting of obesity    #Hx c/s x 2  - pt desires BTL    Spent 20 minutes in face-to-face patient contact in which greater than 50% of that visit was spent in counseling/coordination of care of newly diagnosed pregnancy including medical and surgical options of care.    F/u in 4 weeks for new OB visit
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
## 2021-05-07 ENCOUNTER — INITIAL PRENATAL (OUTPATIENT)
Dept: OBGYN | Facility: CLINIC | Age: 41
End: 2021-05-07
Payer: MEDICAID

## 2021-05-07 ENCOUNTER — HOSPITAL ENCOUNTER (OUTPATIENT)
Facility: MEDICAL CENTER | Age: 41
End: 2021-05-07
Attending: NURSE PRACTITIONER
Payer: MEDICAID

## 2021-05-07 ENCOUNTER — APPOINTMENT (OUTPATIENT)
Dept: OBGYN | Facility: CLINIC | Age: 41
End: 2021-05-07
Payer: MEDICAID

## 2021-05-07 VITALS — DIASTOLIC BLOOD PRESSURE: 72 MMHG | SYSTOLIC BLOOD PRESSURE: 122 MMHG | BODY MASS INDEX: 58.24 KG/M2 | WEIGHT: 293 LBS

## 2021-05-07 DIAGNOSIS — Z34.90 PRENATAL CARE, ANTEPARTUM: ICD-10-CM

## 2021-05-07 LAB
APPEARANCE UR: NORMAL
BILIRUB UR STRIP-MCNC: NORMAL MG/DL
COLOR UR AUTO: NORMAL
GLUCOSE UR STRIP.AUTO-MCNC: NEGATIVE MG/DL
KETONES UR STRIP.AUTO-MCNC: NEGATIVE MG/DL
LEUKOCYTE ESTERASE UR QL STRIP.AUTO: NEGATIVE
NITRITE UR QL STRIP.AUTO: NEGATIVE
PH UR STRIP.AUTO: 5.5 [PH] (ref 5–8)
PROT UR QL STRIP: NEGATIVE MG/DL
RBC UR QL AUTO: NORMAL
SP GR UR STRIP.AUTO: 1.03
UROBILINOGEN UR STRIP-MCNC: NORMAL MG/DL

## 2021-05-07 PROCEDURE — 81002 URINALYSIS NONAUTO W/O SCOPE: CPT | Performed by: NURSE PRACTITIONER

## 2021-05-07 PROCEDURE — 87491 CHLMYD TRACH DNA AMP PROBE: CPT

## 2021-05-07 PROCEDURE — 87591 N.GONORRHOEAE DNA AMP PROB: CPT

## 2021-05-07 PROCEDURE — 0500F INITIAL PRENATAL CARE VISIT: CPT | Mod: 25 | Performed by: NURSE PRACTITIONER

## 2021-05-07 RX ORDER — ASPIRIN 81 MG/1
81 TABLET ORAL DAILY
Qty: 90 TABLET | Refills: 1 | Status: ON HOLD | OUTPATIENT
Start: 2021-05-07 | End: 2021-10-31

## 2021-05-07 ASSESSMENT — FIBROSIS 4 INDEX: FIB4 SCORE: 0.37

## 2021-05-07 ASSESSMENT — ENCOUNTER SYMPTOMS
CONSTITUTIONAL NEGATIVE: 1
EYES NEGATIVE: 1
CARDIOVASCULAR NEGATIVE: 1
NEUROLOGICAL NEGATIVE: 1
GASTROINTESTINAL NEGATIVE: 1
RESPIRATORY NEGATIVE: 1
PSYCHIATRIC NEGATIVE: 1
MUSCULOSKELETAL NEGATIVE: 1

## 2021-05-07 ASSESSMENT — EDINBURGH POSTNATAL DEPRESSION SCALE (EPDS)
TOTAL SCORE: 6
I HAVE BLAMED MYSELF UNNECESSARILY WHEN THINGS WENT WRONG: NOT VERY OFTEN
THINGS HAVE BEEN GETTING ON TOP OF ME: NO, I HAVE BEEN COPING AS WELL AS EVER
THE THOUGHT OF HARMING MYSELF HAS OCCURRED TO ME: NEVER
I HAVE FELT SCARED OR PANICKY FOR NO GOOD REASON: NO, NOT MUCH
I HAVE BEEN SO UNHAPPY THAT I HAVE HAD DIFFICULTY SLEEPING: NOT AT ALL
I HAVE BEEN ANXIOUS OR WORRIED FOR NO GOOD REASON: YES, SOMETIMES
I HAVE LOOKED FORWARD WITH ENJOYMENT TO THINGS: AS MUCH AS I EVER DID
I HAVE FELT SAD OR MISERABLE: NOT VERY OFTEN
I HAVE BEEN ABLE TO LAUGH AND SEE THE FUNNY SIDE OF THINGS: AS MUCH AS I ALWAYS COULD
I HAVE BEEN SO UNHAPPY THAT I HAVE BEEN CRYING: ONLY OCCASIONALLY
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
## 2021-05-07 NOTE — PROGRESS NOTES
NOB today  LMP: 1/30/21  Last pap: 8/29/2017 Negative   [phone] (home)   Pharmacy confirmed  On PNV  Cystic Fibrosis test declined   AFP desired  EPDS 6

## 2021-06-04 ENCOUNTER — HOSPITAL ENCOUNTER (OUTPATIENT)
Dept: LAB | Facility: MEDICAL CENTER | Age: 41
End: 2021-06-04
Attending: NURSE PRACTITIONER
Payer: MEDICAID

## 2021-06-04 DIAGNOSIS — O10.919 CHRONIC HYPERTENSION AFFECTING PREGNANCY: ICD-10-CM

## 2021-06-04 DIAGNOSIS — Z34.90 PRENATAL CARE, ANTEPARTUM: ICD-10-CM

## 2021-06-04 DIAGNOSIS — O99.210 OBESITY IN PREGNANCY: ICD-10-CM

## 2021-06-04 PROCEDURE — 85025 COMPLETE CBC W/AUTO DIFF WBC: CPT

## 2021-06-04 PROCEDURE — 81001 URINALYSIS AUTO W/SCOPE: CPT | Mod: XU

## 2021-06-04 PROCEDURE — 86780 TREPONEMA PALLIDUM: CPT

## 2021-06-04 PROCEDURE — 36415 COLL VENOUS BLD VENIPUNCTURE: CPT

## 2021-06-04 PROCEDURE — 83036 HEMOGLOBIN GLYCOSYLATED A1C: CPT

## 2021-06-04 PROCEDURE — 80307 DRUG TEST PRSMV CHEM ANLYZR: CPT

## 2021-06-04 PROCEDURE — 86762 RUBELLA ANTIBODY: CPT

## 2021-06-04 PROCEDURE — 87389 HIV-1 AG W/HIV-1&-2 AB AG IA: CPT

## 2021-06-04 PROCEDURE — 80053 COMPREHEN METABOLIC PANEL: CPT

## 2021-06-04 PROCEDURE — 87340 HEPATITIS B SURFACE AG IA: CPT

## 2021-06-05 LAB
ALBUMIN SERPL BCP-MCNC: 3.4 G/DL (ref 3.2–4.9)
ALBUMIN/GLOB SERPL: 1.1 G/DL
ALP SERPL-CCNC: 57 U/L (ref 30–99)
ALT SERPL-CCNC: 10 U/L (ref 2–50)
ANION GAP SERPL CALC-SCNC: 9 MMOL/L (ref 7–16)
APPEARANCE UR: CLEAR
AST SERPL-CCNC: 10 U/L (ref 12–45)
BACTERIA #/AREA URNS HPF: ABNORMAL /HPF
BASOPHILS # BLD AUTO: 0.9 % (ref 0–1.8)
BASOPHILS # BLD: 0.09 K/UL (ref 0–0.12)
BILIRUB SERPL-MCNC: 0.2 MG/DL (ref 0.1–1.5)
BILIRUB UR QL STRIP.AUTO: NEGATIVE
BUN SERPL-MCNC: 8 MG/DL (ref 8–22)
CALCIUM SERPL-MCNC: 8.9 MG/DL (ref 8.5–10.5)
CHLORIDE SERPL-SCNC: 103 MMOL/L (ref 96–112)
CO2 SERPL-SCNC: 23 MMOL/L (ref 20–33)
COLOR UR: YELLOW
CREAT SERPL-MCNC: 0.38 MG/DL (ref 0.5–1.4)
EOSINOPHIL # BLD AUTO: 0.12 K/UL (ref 0–0.51)
EOSINOPHIL NFR BLD: 1.1 % (ref 0–6.9)
EPI CELLS #/AREA URNS HPF: ABNORMAL /HPF
ERYTHROCYTE [DISTWIDTH] IN BLOOD BY AUTOMATED COUNT: 43.1 FL (ref 35.9–50)
EST. AVERAGE GLUCOSE BLD GHB EST-MCNC: 100 MG/DL
GLOBULIN SER CALC-MCNC: 3.1 G/DL (ref 1.9–3.5)
GLUCOSE SERPL-MCNC: 82 MG/DL (ref 65–99)
GLUCOSE UR STRIP.AUTO-MCNC: NEGATIVE MG/DL
HBA1C MFR BLD: 5.1 % (ref 4–5.6)
HBV SURFACE AG SER QL: ABNORMAL
HCT VFR BLD AUTO: 39.5 % (ref 37–47)
HGB BLD-MCNC: 12.7 G/DL (ref 12–16)
HIV 1+2 AB+HIV1 P24 AG SERPL QL IA: NORMAL
IMM GRANULOCYTES # BLD AUTO: 0.05 K/UL (ref 0–0.11)
IMM GRANULOCYTES NFR BLD AUTO: 0.5 % (ref 0–0.9)
KETONES UR STRIP.AUTO-MCNC: NEGATIVE MG/DL
LEUKOCYTE ESTERASE UR QL STRIP.AUTO: NEGATIVE
LYMPHOCYTES # BLD AUTO: 2.6 K/UL (ref 1–4.8)
LYMPHOCYTES NFR BLD: 24.6 % (ref 22–41)
MCH RBC QN AUTO: 27.4 PG (ref 27–33)
MCHC RBC AUTO-ENTMCNC: 32.2 G/DL (ref 33.6–35)
MCV RBC AUTO: 85.3 FL (ref 81.4–97.8)
MICRO URNS: ABNORMAL
MONOCYTES # BLD AUTO: 0.41 K/UL (ref 0–0.85)
MONOCYTES NFR BLD AUTO: 3.9 % (ref 0–13.4)
MUCOUS THREADS #/AREA URNS HPF: ABNORMAL /HPF
NEUTROPHILS # BLD AUTO: 7.28 K/UL (ref 2–7.15)
NEUTROPHILS NFR BLD: 69 % (ref 44–72)
NITRITE UR QL STRIP.AUTO: NEGATIVE
NRBC # BLD AUTO: 0 K/UL
NRBC BLD-RTO: 0 /100 WBC
PH UR STRIP.AUTO: 6.5 [PH] (ref 5–8)
PLATELET # BLD AUTO: 250 K/UL (ref 164–446)
PMV BLD AUTO: 10 FL (ref 9–12.9)
POTASSIUM SERPL-SCNC: 4.4 MMOL/L (ref 3.6–5.5)
PROT SERPL-MCNC: 6.5 G/DL (ref 6–8.2)
PROT UR QL STRIP: NEGATIVE MG/DL
RBC # BLD AUTO: 4.63 M/UL (ref 4.2–5.4)
RBC # URNS HPF: ABNORMAL /HPF
RBC UR QL AUTO: ABNORMAL
RUBV AB SER QL: 321 IU/ML
SODIUM SERPL-SCNC: 135 MMOL/L (ref 135–145)
SP GR UR STRIP.AUTO: 1.02
TREPONEMA PALLIDUM IGG+IGM AB [PRESENCE] IN SERUM OR PLASMA BY IMMUNOASSAY: ABNORMAL
UROBILINOGEN UR STRIP.AUTO-MCNC: 0.2 MG/DL
WBC # BLD AUTO: 10.6 K/UL (ref 4.8–10.8)
WBC #/AREA URNS HPF: ABNORMAL /HPF

## 2021-06-07 LAB
AMPHETAMINES UR QL: NEGATIVE NG/ML
BARBITURATES UR QL: NEGATIVE NG/ML
BENZODIAZ UR QL: NEGATIVE NG/ML
CANNABINOIDS UR QL SCN: POSITIVE NG/ML
COCAINE UR QL: NEGATIVE NG/ML
DRUG SCREEN COMMENT UR-IMP: NORMAL
MDMA CTO UR SCN-MCNC: NEGATIVE NG/ML
METHADONE UR QL: NEGATIVE NG/ML
OPIATES UR QL: NEGATIVE NG/ML
OXYCODONE CTO UR SCN-MCNC: NEGATIVE NG/ML
PCP UR QL SCN: NEGATIVE NG/ML
PROPOXYPH UR QL: NEGATIVE NG/ML

## 2021-06-22 ENCOUNTER — ROUTINE PRENATAL (OUTPATIENT)
Dept: OBGYN | Facility: CLINIC | Age: 41
End: 2021-06-22
Payer: MEDICAID

## 2021-06-22 ENCOUNTER — APPOINTMENT (OUTPATIENT)
Dept: RADIOLOGY | Facility: IMAGING CENTER | Age: 41
End: 2021-06-22
Attending: NURSE PRACTITIONER
Payer: MEDICAID

## 2021-06-22 VITALS — SYSTOLIC BLOOD PRESSURE: 118 MMHG | BODY MASS INDEX: 58.91 KG/M2 | WEIGHT: 293 LBS | DIASTOLIC BLOOD PRESSURE: 78 MMHG

## 2021-06-22 DIAGNOSIS — O09.529 ANTEPARTUM MULTIGRAVIDA OF ADVANCED MATERNAL AGE: Primary | ICD-10-CM

## 2021-06-22 PROCEDURE — 90040 PR PRENATAL FOLLOW UP: CPT | Performed by: NURSE PRACTITIONER

## 2021-06-22 ASSESSMENT — FIBROSIS 4 INDEX: FIB4 SCORE: 0.49

## 2021-06-22 NOTE — PROGRESS NOTES
OB follow up   + fetal movement. Active  No VB, LOF or UC's.  Phone # [phone]  Preferred pharmacy confirmed.  No complaints as of today   F/u with Dr Zaragoza on 08/16/2021  US today

## 2021-07-20 ENCOUNTER — ROUTINE PRENATAL (OUTPATIENT)
Dept: OBGYN | Facility: CLINIC | Age: 41
End: 2021-07-20
Payer: MEDICAID

## 2021-07-20 VITALS — BODY MASS INDEX: 60.41 KG/M2 | WEIGHT: 293 LBS | DIASTOLIC BLOOD PRESSURE: 80 MMHG | SYSTOLIC BLOOD PRESSURE: 126 MMHG

## 2021-07-20 DIAGNOSIS — Z67.91 RH NEGATIVE STATE IN ANTEPARTUM PERIOD: ICD-10-CM

## 2021-07-20 DIAGNOSIS — Z98.891 HISTORY OF CESAREAN SECTION: ICD-10-CM

## 2021-07-20 DIAGNOSIS — I10 ESSENTIAL HYPERTENSION: ICD-10-CM

## 2021-07-20 DIAGNOSIS — O26.899 RH NEGATIVE STATE IN ANTEPARTUM PERIOD: ICD-10-CM

## 2021-07-20 DIAGNOSIS — O09.892 SUPERVISION OF OTHER HIGH RISK PREGNANCIES, SECOND TRIMESTER: Primary | ICD-10-CM

## 2021-07-20 PROCEDURE — 90040 PR PRENATAL FOLLOW UP: CPT | Performed by: NURSE PRACTITIONER

## 2021-07-20 ASSESSMENT — FIBROSIS 4 INDEX: FIB4 SCORE: 0.49
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
## 2021-07-20 NOTE — PROGRESS NOTES
OB follow up   + fetal movement.  No VB, LOF or UC's.  Phone # [phone]  Preferred pharmacy confirmed.  3rd trimester lab orders pended and instructions given to patient  Dr. Zaragoza 8/16/21

## 2021-07-30 ENCOUNTER — HOSPITAL ENCOUNTER (OUTPATIENT)
Dept: LAB | Facility: MEDICAL CENTER | Age: 41
End: 2021-07-30
Attending: NURSE PRACTITIONER
Payer: MEDICAID

## 2021-07-30 DIAGNOSIS — O09.892 SUPERVISION OF OTHER HIGH RISK PREGNANCIES, SECOND TRIMESTER: ICD-10-CM

## 2021-07-30 LAB
ERYTHROCYTE [DISTWIDTH] IN BLOOD BY AUTOMATED COUNT: 41 FL (ref 35.9–50)
GLUCOSE 1H P 50 G GLC PO SERPL-MCNC: 120 MG/DL (ref 70–139)
HCT VFR BLD AUTO: 37.3 % (ref 37–47)
HGB BLD-MCNC: 11.8 G/DL (ref 12–16)
MCH RBC QN AUTO: 27.5 PG (ref 27–33)
MCHC RBC AUTO-ENTMCNC: 31.6 G/DL (ref 33.6–35)
MCV RBC AUTO: 86.9 FL (ref 81.4–97.8)
PLATELET # BLD AUTO: 241 K/UL (ref 164–446)
PMV BLD AUTO: 9.9 FL (ref 9–12.9)
RBC # BLD AUTO: 4.29 M/UL (ref 4.2–5.4)
TREPONEMA PALLIDUM IGG+IGM AB [PRESENCE] IN SERUM OR PLASMA BY IMMUNOASSAY: NORMAL
WBC # BLD AUTO: 11.3 K/UL (ref 4.8–10.8)

## 2021-07-30 PROCEDURE — 85027 COMPLETE CBC AUTOMATED: CPT

## 2021-07-30 PROCEDURE — 36415 COLL VENOUS BLD VENIPUNCTURE: CPT

## 2021-07-30 PROCEDURE — 86780 TREPONEMA PALLIDUM: CPT

## 2021-07-30 PROCEDURE — 82950 GLUCOSE TEST: CPT

## 2021-08-03 ENCOUNTER — ROUTINE PRENATAL (OUTPATIENT)
Dept: OBGYN | Facility: CLINIC | Age: 41
End: 2021-08-03
Payer: MEDICAID

## 2021-08-03 VITALS — BODY MASS INDEX: 61.57 KG/M2 | WEIGHT: 293 LBS | SYSTOLIC BLOOD PRESSURE: 122 MMHG | DIASTOLIC BLOOD PRESSURE: 78 MMHG

## 2021-08-03 DIAGNOSIS — Z34.82 ENCOUNTER FOR SUPERVISION OF OTHER NORMAL PREGNANCY IN SECOND TRIMESTER: ICD-10-CM

## 2021-08-03 PROCEDURE — 90040 PR PRENATAL FOLLOW UP: CPT | Performed by: ADVANCED PRACTICE MIDWIFE

## 2021-08-03 ASSESSMENT — FIBROSIS 4 INDEX: FIB4 SCORE: 0.51

## 2021-08-03 NOTE — NON-PROVIDER
Pt here today for OB follow up  Pt states no complaints   Reports +FM  Good # [phone]  Pharmacy Confirmed.  Chaperone offered and not indicated.   Pt has an appt with  on 8/16/21  1 hr GTT, WNL

## 2021-08-16 ENCOUNTER — HOSPITAL ENCOUNTER (EMERGENCY)
Facility: MEDICAL CENTER | Age: 41
End: 2021-08-16
Attending: OBSTETRICS & GYNECOLOGY | Admitting: OBSTETRICS & GYNECOLOGY
Payer: MEDICAID

## 2021-08-16 VITALS
OXYGEN SATURATION: 97 % | BODY MASS INDEX: 48.82 KG/M2 | RESPIRATION RATE: 18 BRPM | DIASTOLIC BLOOD PRESSURE: 83 MMHG | HEART RATE: 83 BPM | WEIGHT: 293 LBS | TEMPERATURE: 97.8 F | HEIGHT: 65 IN | SYSTOLIC BLOOD PRESSURE: 135 MMHG

## 2021-08-16 LAB
ALBUMIN SERPL BCP-MCNC: 3.1 G/DL (ref 3.2–4.9)
ALBUMIN/GLOB SERPL: 1.1 G/DL
ALP SERPL-CCNC: 68 U/L (ref 30–99)
ALT SERPL-CCNC: 8 U/L (ref 2–50)
ANION GAP SERPL CALC-SCNC: 11 MMOL/L (ref 7–16)
AST SERPL-CCNC: 10 U/L (ref 12–45)
BASOPHILS # BLD AUTO: 0.5 % (ref 0–1.8)
BASOPHILS # BLD: 0.06 K/UL (ref 0–0.12)
BILIRUB SERPL-MCNC: <0.2 MG/DL (ref 0.1–1.5)
BUN SERPL-MCNC: 12 MG/DL (ref 8–22)
CALCIUM SERPL-MCNC: 8.7 MG/DL (ref 8.5–10.5)
CHLORIDE SERPL-SCNC: 106 MMOL/L (ref 96–112)
CO2 SERPL-SCNC: 20 MMOL/L (ref 20–33)
CREAT SERPL-MCNC: 0.37 MG/DL (ref 0.5–1.4)
CREAT UR-MCNC: 144.43 MG/DL
EOSINOPHIL # BLD AUTO: 0.17 K/UL (ref 0–0.51)
EOSINOPHIL NFR BLD: 1.3 % (ref 0–6.9)
ERYTHROCYTE [DISTWIDTH] IN BLOOD BY AUTOMATED COUNT: 38.7 FL (ref 35.9–50)
GLOBULIN SER CALC-MCNC: 2.9 G/DL (ref 1.9–3.5)
GLUCOSE SERPL-MCNC: 118 MG/DL (ref 65–99)
HCT VFR BLD AUTO: 35.2 % (ref 37–47)
HGB BLD-MCNC: 11.6 G/DL (ref 12–16)
IMM GRANULOCYTES # BLD AUTO: 0.11 K/UL (ref 0–0.11)
IMM GRANULOCYTES NFR BLD AUTO: 0.9 % (ref 0–0.9)
LYMPHOCYTES # BLD AUTO: 2.78 K/UL (ref 1–4.8)
LYMPHOCYTES NFR BLD: 22 % (ref 22–41)
MCH RBC QN AUTO: 28.2 PG (ref 27–33)
MCHC RBC AUTO-ENTMCNC: 33 G/DL (ref 33.6–35)
MCV RBC AUTO: 85.4 FL (ref 81.4–97.8)
MONOCYTES # BLD AUTO: 0.67 K/UL (ref 0–0.85)
MONOCYTES NFR BLD AUTO: 5.3 % (ref 0–13.4)
NEUTROPHILS # BLD AUTO: 8.85 K/UL (ref 2–7.15)
NEUTROPHILS NFR BLD: 70 % (ref 44–72)
NRBC # BLD AUTO: 0 K/UL
NRBC BLD-RTO: 0 /100 WBC
PLATELET # BLD AUTO: 245 K/UL (ref 164–446)
PMV BLD AUTO: 9.5 FL (ref 9–12.9)
POTASSIUM SERPL-SCNC: 3.8 MMOL/L (ref 3.6–5.5)
PROT SERPL-MCNC: 6 G/DL (ref 6–8.2)
PROT UR-MCNC: 34 MG/DL (ref 0–15)
PROT/CREAT UR: 235 MG/G (ref 10–107)
RBC # BLD AUTO: 4.12 M/UL (ref 4.2–5.4)
SODIUM SERPL-SCNC: 137 MMOL/L (ref 135–145)
URATE SERPL-MCNC: 3.7 MG/DL (ref 1.9–8.2)
WBC # BLD AUTO: 12.6 K/UL (ref 4.8–10.8)

## 2021-08-16 PROCEDURE — 85025 COMPLETE CBC W/AUTO DIFF WBC: CPT

## 2021-08-16 PROCEDURE — 302449 STATCHG TRIAGE ONLY (STATISTIC)

## 2021-08-16 PROCEDURE — 82570 ASSAY OF URINE CREATININE: CPT

## 2021-08-16 PROCEDURE — 99282 EMERGENCY DEPT VISIT SF MDM: CPT | Performed by: OBSTETRICS & GYNECOLOGY

## 2021-08-16 PROCEDURE — 84550 ASSAY OF BLOOD/URIC ACID: CPT

## 2021-08-16 PROCEDURE — 80053 COMPREHEN METABOLIC PANEL: CPT

## 2021-08-16 PROCEDURE — 84156 ASSAY OF PROTEIN URINE: CPT

## 2021-08-16 PROCEDURE — 36415 COLL VENOUS BLD VENIPUNCTURE: CPT

## 2021-08-16 ASSESSMENT — FIBROSIS 4 INDEX
FIB4 SCORE: 0.51
FIB4 SCORE: 0.51

## 2021-08-16 ASSESSMENT — PAIN SCALES - GENERAL: PAINLEVEL: 0 - NO PAIN

## 2021-08-17 NOTE — PROGRESS NOTES
1646. Pt here from Dr. Zaragoza for blood pressure evaluation. Per Dr. Pineda of to doppler. Doppler 155. BP taken. Assessment done. Pt denies HA, spots in her vision or epigastric pain. Pt denies uc's, leaking or bleeding and notes +FM. Urine obtained and sent.

## 2021-08-17 NOTE — DISCHARGE INSTRUCTIONS
Preeclampsia and Eclampsia  Preeclampsia is a serious condition that may develop during pregnancy. This condition causes high blood pressure and increased protein in your urine along with other symptoms, such as headaches and vision changes. These symptoms may develop as the condition gets worse. Preeclampsia may occur at 20 weeks of pregnancy or later.  Diagnosing and treating preeclampsia early is very important. If not treated early, it can cause serious problems for you and your baby. One problem it can lead to is eclampsia. Eclampsia is a condition that causes muscle jerking or shaking (convulsions or seizures) and other serious problems for the mother. During pregnancy, delivering your baby may be the best treatment for preeclampsia or eclampsia. For most women, preeclampsia and eclampsia symptoms go away after giving birth.  In rare cases, a woman may develop preeclampsia after giving birth (postpartum preeclampsia). This usually occurs within 48 hours after childbirth but may occur up to 6 weeks after giving birth.  What are the causes?  The cause of preeclampsia is not known.  What increases the risk?  The following risk factors make you more likely to develop preeclampsia:  · Being pregnant for the first time.  · Having had preeclampsia during a past pregnancy.  · Having a family history of preeclampsia.  · Having high blood pressure.  · Being pregnant with more than one baby.  · Being 35 or older.  · Being -American.  · Having kidney disease or diabetes.  · Having medical conditions such as lupus or blood diseases.  · Being very overweight (obese).  What are the signs or symptoms?  The most common symptoms are:  · Severe headaches.  · Vision problems, such as blurred or double vision.  · Abdominal pain, especially upper abdominal pain.  Other symptoms that may develop as the condition gets worse include:  · Sudden weight gain.  · Sudden swelling of the hands, face, legs, and feet.  · Severe nausea  and vomiting.  · Numbness in the face, arms, legs, and feet.  · Dizziness.  · Urinating less than usual.  · Slurred speech.  · Convulsions or seizures.  How is this diagnosed?  There are no screening tests for preeclampsia. Your health care provider will ask you about symptoms and check for signs of preeclampsia during your prenatal visits. You may also have tests that include:  · Checking your blood pressure.  · Urine tests to check for protein. Your health care provider will check for this at every prenatal visit.  · Blood tests.  · Monitoring your baby's heart rate.  · Ultrasound.  How is this treated?  You and your health care provider will determine the treatment approach that is best for you. Treatment may include:  · Having more frequent prenatal exams to check for signs of preeclampsia, if you have an increased risk for preeclampsia.  · Medicine to lower your blood pressure.  · Staying in the hospital, if your condition is severe. There, treatment will focus on controlling your blood pressure and the amount of fluids in your body (fluid retention).  · Taking medicine (magnesium sulfate) to prevent seizures. This may be given as an injection or through an IV.  · Taking a low-dose aspirin during your pregnancy.  · Delivering your baby early. You may have your labor started with medicine (induced), or you may have a  delivery.  Follow these instructions at home:  Eating and drinking    · Drink enough fluid to keep your urine pale yellow.  · Avoid caffeine.  Lifestyle  · Do not use any products that contain nicotine or tobacco, such as cigarettes and e-cigarettes. If you need help quitting, ask your health care provider.  · Do not use alcohol or drugs.  · Avoid stress as much as possible. Rest and get plenty of sleep.  General instructions  · Take over-the-counter and prescription medicines only as told by your health care provider.  · When lying down, lie on your left side. This keeps pressure off your  major blood vessels.  · When sitting or lying down, raise (elevate) your feet. Try putting some pillows underneath your lower legs.  · Exercise regularly. Ask your health care provider what kinds of exercise are best for you.  · Keep all follow-up and prenatal visits as told by your health care provider. This is important.  How is this prevented?  There is no known way of preventing preeclampsia or eclampsia from developing. However, to lower your risk of complications and detect problems early:  · Get regular prenatal care. Your health care provider may be able to diagnose and treat the condition early.  · Maintain a healthy weight. Ask your health care provider for help managing weight gain during pregnancy.  · Work with your health care provider to manage any long-term (chronic) health conditions you have, such as diabetes or kidney problems.  · You may have tests of your blood pressure and kidney function after giving birth.  · Your health care provider may have you take low-dose aspirin during your next pregnancy.  Contact a health care provider if:  · You have symptoms that your health care provider told you may require more treatment or monitoring, such as:  ? Headaches.  ? Nausea or vomiting.  ? Abdominal pain.  ? Dizziness.  ? Light-headedness.  Get help right away if:  · You have severe:  ? Abdominal pain.  ? Headaches that do not get better.  ? Dizziness.  ? Vision problems.  ? Confusion.  ? Nausea or vomiting.  · You have any of the following:  ? A seizure.  ? Sudden, rapid weight gain.  ? Sudden swelling in your hands, ankles, or face.  ? Trouble moving any part of your body.  ? Numbness in any part of your body.  ? Trouble speaking.  ? Abnormal bleeding.  · You faint.  Summary  · Preeclampsia is a serious condition that may develop during pregnancy.  · This condition causes high blood pressure and increased protein in your urine along with other symptoms, such as headaches and vision  changes.  · Diagnosing and treating preeclampsia early is very important. If not treated early, it can cause serious problems for you and your baby.  · Get help right away if you have symptoms that your health care provider told you to watch for.  This information is not intended to replace advice given to you by your health care provider. Make sure you discuss any questions you have with your health care provider.  Document Released: 12/15/2001 Document Revised: 08/20/2019 Document Reviewed: 07/24/2017  [employer] Patient Education © 2020 [employer]  Pre-term Labor (<37 weeks):  Call your physician or return to the hospital if:  · You have painless regular contractions more than 4 in one hour.  · Your water breaks (remember time and color).  · You have menstrual-like cramps, a low dull backache or pressure in your pelvis or back.  · Your baby does not move enough to complete the daily kick count (10 movements in 2 hours).  · Your baby moves much less often than on the days before or you have not felt your baby move all day.  · Please review the MEDICATION LIST section of your AFTER VISIT SUMMARY document.  · Take your medication as prescribed
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
## 2021-08-17 NOTE — FLOWSHEET NOTE
1940: Discharge paperwork given to pt. Questions answered. Discharge education provided. Pt. Ambulatory, discharged to private vehicle.

## 2021-08-25 ENCOUNTER — ROUTINE PRENATAL (OUTPATIENT)
Dept: OBGYN | Facility: CLINIC | Age: 41
End: 2021-08-25
Payer: MEDICAID

## 2021-08-25 VITALS — DIASTOLIC BLOOD PRESSURE: 82 MMHG | WEIGHT: 293 LBS | SYSTOLIC BLOOD PRESSURE: 122 MMHG | BODY MASS INDEX: 62.9 KG/M2

## 2021-08-25 DIAGNOSIS — O26.899 RH NEGATIVE, ANTEPARTUM: ICD-10-CM

## 2021-08-25 DIAGNOSIS — Z67.91 RH NEGATIVE, ANTEPARTUM: ICD-10-CM

## 2021-08-25 DIAGNOSIS — O26.899 RH NEGATIVE STATE IN ANTEPARTUM PERIOD: ICD-10-CM

## 2021-08-25 DIAGNOSIS — Z67.91 RH NEGATIVE STATE IN ANTEPARTUM PERIOD: ICD-10-CM

## 2021-08-25 PROBLEM — O09.529 ADVANCED MATERNAL AGE IN MULTIGRAVIDA: Status: ACTIVE | Noted: 2021-08-25

## 2021-08-25 PROBLEM — Z64.1 GRAND MULTIPARA: Status: ACTIVE | Noted: 2021-08-25

## 2021-08-25 PROBLEM — Z87.59 HISTORY OF POSTPARTUM DEPRESSION: Status: ACTIVE | Noted: 2021-08-25

## 2021-08-25 PROBLEM — Z86.59 HISTORY OF POSTPARTUM DEPRESSION: Status: ACTIVE | Noted: 2021-08-25

## 2021-08-25 PROCEDURE — 90471 IMMUNIZATION ADMIN: CPT | Performed by: NURSE PRACTITIONER

## 2021-08-25 PROCEDURE — 90040 PR PRENATAL FOLLOW UP: CPT | Performed by: NURSE PRACTITIONER

## 2021-08-25 PROCEDURE — 90715 TDAP VACCINE 7 YRS/> IM: CPT | Performed by: NURSE PRACTITIONER

## 2021-08-25 ASSESSMENT — FIBROSIS 4 INDEX: FIB4 SCORE: 0.56

## 2021-08-25 NOTE — NON-PROVIDER
OB follow up   + fetal movement.  No VB, LOF or UC's.  Phone # [phone]  Preferred pharmacy confirmed.    T dap given     ND: 36110-403-15  LOT#: [id number]  Expiration Date: 2023    Dose: 0.5 ml  Site: Right Deltoid     Patient educated on use and side effects of medication. Name and  verified prior to injection. Pt tolerated? Yes    Verified by MS    Administered by Danii Medina, Med Ass't at 8:46 AM.    Patient Provided Medication:NO    Rhogam given     NDC:73202-877-77  LOT#: [id number]  Expiration Date: 2023    Dose: 300 mcg  Site: Right Glut    Patient educated on use and side effects of medication. Name and  verified prior to injection. Pt tolerated? Yes     Verified by MS    Administered by Danii Medina, Med Ass't at 8:47 AM.    Patient Provided Medication:NO
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
## 2021-09-08 ENCOUNTER — ROUTINE PRENATAL (OUTPATIENT)
Dept: OBGYN | Facility: CLINIC | Age: 41
End: 2021-09-08
Payer: MEDICAID

## 2021-09-08 VITALS — BODY MASS INDEX: 63.4 KG/M2 | WEIGHT: 293 LBS | SYSTOLIC BLOOD PRESSURE: 118 MMHG | DIASTOLIC BLOOD PRESSURE: 80 MMHG

## 2021-09-08 DIAGNOSIS — O10.919 CHRONIC HYPERTENSION AFFECTING PREGNANCY: ICD-10-CM

## 2021-09-08 PROCEDURE — 90040 PR PRENATAL FOLLOW UP: CPT | Performed by: NURSE PRACTITIONER

## 2021-09-08 ASSESSMENT — FIBROSIS 4 INDEX: FIB4 SCORE: 0.56

## 2021-09-08 NOTE — LETTER
"Count Your Baby's Movements  Another step to a healthy delivery    A Epic Dress Re Test             Dept: [phone]    How Many Weeks Pregnant? 31 weeks 4 days    Date to Begin Counting: Today               How to use this chart    One way for your physician to keep track of your baby's health is by knowing how often the baby moves (or \"kicks\") in your womb.  You can help your physician to do this by using this chart every day.    Every day, you should see how many hours it takes for your baby to move 10 times.  Start in the morning, as soon as you get up.    · First, write down the time your baby moves until you get to 10.  · Check off one box every time your baby moves until you get to 10.  · Write down the time you finished counting in the last column.  · Total how long it took to count up all 10 movements.  · Finally, fill in the box that shows how long this took.  After counting 10 movements, you no longer have to count any more that day.  The next morning, just start counting again as soon as you get up.    What should you call a \"movement\"?  It is hard to say, because it will feel different from one mother to another and from one pregnancy to the next.  The important thing is that you count the movements the same way throughout your pregnancy.  If you have more questions, you should ask your physician.    Count carefully every day!  SAMPLE:  Week 28    How many hours did it take to feel 10 movements?       Start  Time     1     2     3     4     5     6     7     8     9     10   Finish Time   Mon 8:20 ·  ·  ·  ·  ·  ·  ·  ·  ·  ·  11:40   Tue Wed Thu Fri               Sat               Sun                 IMPORTANT: You should contact your physician if it takes more than two hours for you to feel 10 movements.  Each morning, write down the time and start to count the movements of your baby.  Keep track by checking off one box every time you feel one movement.  " "When you have felt 10 \"kicks\", write down the time you finished counting in the last column.  Then fill in the   box (over the check stephanie) for the number of hours it took.  Be sure to read the complete instructions on the previous page.            "
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
## 2021-09-08 NOTE — PROGRESS NOTES
Pt. Here for OB/FU. Reports Good FM.   Kick count sheet given today along with instructions.   Good # [phone]  Pt states no complaints.   Pharmacy verified.   Chaperone offered and not needed.

## 2021-09-13 ENCOUNTER — TELEPHONE (OUTPATIENT)
Dept: OBGYN | Facility: CLINIC | Age: 41
End: 2021-09-13

## 2021-09-13 NOTE — TELEPHONE ENCOUNTER
Kourtney from 's office called to inform us that this patient needs to be started on twice weekly NST's.

## 2021-09-22 ENCOUNTER — ROUTINE PRENATAL (OUTPATIENT)
Dept: OBGYN | Facility: CLINIC | Age: 41
End: 2021-09-22
Payer: MEDICAID

## 2021-09-22 VITALS — SYSTOLIC BLOOD PRESSURE: 124 MMHG | BODY MASS INDEX: 64.57 KG/M2 | WEIGHT: 293 LBS | DIASTOLIC BLOOD PRESSURE: 76 MMHG

## 2021-09-22 DIAGNOSIS — O09.893 SUPERVISION OF OTHER HIGH RISK PREGNANCIES, THIRD TRIMESTER: Primary | ICD-10-CM

## 2021-09-22 PROCEDURE — 90040 PR PRENATAL FOLLOW UP: CPT | Performed by: NURSE PRACTITIONER

## 2021-09-22 ASSESSMENT — FIBROSIS 4 INDEX: FIB4 SCORE: 0.56

## 2021-09-22 NOTE — PROGRESS NOTES
Growth US with Oki on 9/13/21-51%.  Pt with mild elevated BP at that time.  Oki recommends NST twice weekly.  F/U not necessary other than NST here.  Pt unable to attend until next week due to work commitments.  Will do one next week and then with each weekly appt.  Pt aware recommendation is for twice weekly due to chronic HTN.    Will place referral for repeat US at 39 weeks.  Dropped off paperwork for part time work and aware will fill out and fax  RTC 2 weeks and next week for NST.  Preeclampsia s/s discussed.
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
## 2021-09-22 NOTE — PROGRESS NOTES
OB follow up   + fetal movement.  No VB, LOF or UC's.  Phone # [phone]  Preferred pharmacy confirmed.  No more folllow up visits with Dr Zaragoza  Pt to start NST. Prefers to start next visit

## 2021-09-28 ENCOUNTER — ROUTINE PRENATAL (OUTPATIENT)
Dept: OBGYN | Facility: CLINIC | Age: 41
End: 2021-09-28
Payer: MEDICAID

## 2021-09-28 DIAGNOSIS — O10.919 CHRONIC HYPERTENSION AFFECTING PREGNANCY: ICD-10-CM

## 2021-09-28 LAB
NST ACOUSTIC STIMULATION: NORMAL
NST ACTION NECESSARY: NORMAL
NST ASSESSMENT: NORMAL
NST BASELINE: NORMAL
NST INDICATIONS: NORMAL
NST OTHER DATA: NORMAL
NST READ BY: NORMAL
NST RETURN: NORMAL
NST UTERINE ACTIVITY: NORMAL

## 2021-09-28 PROCEDURE — 59025 FETAL NON-STRESS TEST: CPT | Performed by: OBSTETRICS & GYNECOLOGY

## 2021-10-05 ENCOUNTER — ROUTINE PRENATAL (OUTPATIENT)
Dept: OBGYN | Facility: CLINIC | Age: 41
End: 2021-10-05
Payer: MEDICAID

## 2021-10-05 ENCOUNTER — NON-PROVIDER VISIT (OUTPATIENT)
Dept: OBGYN | Facility: CLINIC | Age: 41
End: 2021-10-05
Payer: MEDICAID

## 2021-10-05 VITALS — DIASTOLIC BLOOD PRESSURE: 70 MMHG | SYSTOLIC BLOOD PRESSURE: 147 MMHG

## 2021-10-05 DIAGNOSIS — O09.529 ANTEPARTUM MULTIGRAVIDA OF ADVANCED MATERNAL AGE: ICD-10-CM

## 2021-10-05 DIAGNOSIS — O09.893 SUPERVISION OF OTHER HIGH RISK PREGNANCIES, THIRD TRIMESTER: ICD-10-CM

## 2021-10-05 DIAGNOSIS — O10.919 CHRONIC HYPERTENSION AFFECTING PREGNANCY: ICD-10-CM

## 2021-10-05 PROCEDURE — 90040 PR PRENATAL FOLLOW UP: CPT | Performed by: NURSE PRACTITIONER

## 2021-10-13 ENCOUNTER — PRE-ADMISSION TESTING (OUTPATIENT)
Dept: ADMISSIONS | Facility: MEDICAL CENTER | Age: 41
End: 2021-10-13
Attending: OBSTETRICS & GYNECOLOGY
Payer: MEDICAID

## 2021-10-13 RX ORDER — FLUOXETINE HYDROCHLORIDE 20 MG/1
20 CAPSULE ORAL DAILY
COMMUNITY

## 2021-10-14 ENCOUNTER — APPOINTMENT (OUTPATIENT)
Dept: OBGYN | Facility: CLINIC | Age: 41
End: 2021-10-14
Payer: MEDICAID

## 2021-10-14 ENCOUNTER — ROUTINE PRENATAL (OUTPATIENT)
Dept: OBGYN | Facility: CLINIC | Age: 41
End: 2021-10-14
Payer: MEDICAID

## 2021-10-14 ENCOUNTER — HOSPITAL ENCOUNTER (OUTPATIENT)
Facility: MEDICAL CENTER | Age: 41
End: 2021-10-14
Attending: OBSTETRICS & GYNECOLOGY
Payer: MEDICAID

## 2021-10-14 VITALS — BODY MASS INDEX: 65.73 KG/M2 | DIASTOLIC BLOOD PRESSURE: 80 MMHG | WEIGHT: 293 LBS | SYSTOLIC BLOOD PRESSURE: 133 MMHG

## 2021-10-14 DIAGNOSIS — O09.523 HIGH RISK PREGNANCY, MULTIGRAVIDA OF ADVANCED MATERNAL AGE IN THIRD TRIMESTER: ICD-10-CM

## 2021-10-14 DIAGNOSIS — Z67.91 RH NEGATIVE STATUS DURING PREGNANCY IN THIRD TRIMESTER: ICD-10-CM

## 2021-10-14 DIAGNOSIS — Z64.1 GRAND MULTIPARA: ICD-10-CM

## 2021-10-14 DIAGNOSIS — O09.93 HIGH-RISK PREGNANCY SUPERVISION, THIRD TRIMESTER: ICD-10-CM

## 2021-10-14 DIAGNOSIS — Z98.891 HISTORY OF CESAREAN SECTION: ICD-10-CM

## 2021-10-14 DIAGNOSIS — O10.919 CHRONIC HYPERTENSION AFFECTING PREGNANCY: ICD-10-CM

## 2021-10-14 DIAGNOSIS — O09.523 MULTIGRAVIDA OF ADVANCED MATERNAL AGE IN THIRD TRIMESTER: ICD-10-CM

## 2021-10-14 DIAGNOSIS — O26.893 RH NEGATIVE STATUS DURING PREGNANCY IN THIRD TRIMESTER: ICD-10-CM

## 2021-10-14 PROCEDURE — 87150 DNA/RNA AMPLIFIED PROBE: CPT

## 2021-10-14 PROCEDURE — 87081 CULTURE SCREEN ONLY: CPT

## 2021-10-14 PROCEDURE — 90040 PR PRENATAL FOLLOW UP: CPT | Performed by: OBSTETRICS & GYNECOLOGY

## 2021-10-14 ASSESSMENT — FIBROSIS 4 INDEX: FIB4 SCORE: 0.56

## 2021-10-14 NOTE — PROGRESS NOTES
Chief complaint: Return to be visit    S: Pt presents for routine OB follow up. Good fetal movement.  No contractions, vaginal bleeding, or leakage of fluid.    Questions answered.    O: /80   Wt (!) 179 kg (395 lb)   LMP 2021   BMI 65.73 kg/m²   Patients' weight gain, fluid intake and exercise level discussed.  Vitals, fundal height , fetal position, and FHR reviewed on flowsheet    Lab:  Recent Results (from the past 336 hour(s))   POCT Fetal Nonstress Test    Collection Time: 10/14/21  8:10 AM   Result Value Ref Range    NST Indications      NST Baseline      NST Uterine Activity      NST Acoustic Stimulation      NST Assessment      NST Action Necessary      NST Other Data      NST Return      NST Read By         A/P:  40 y.o.  at 36w5d presents for routine obstetric follow-up.  Size equals dates and/or scan    1.  Continue prenatal vitamins.  2.  Fetal kick counts.  3.  Exercise at least 30 minutes daily.  4.  Drink at least 2L of water daily  5.  Labor precautions educated.  6.  Follow-up in 1 weeks.  7.  GBS today    Plan for repeat  at 39 weeks gestation    All questions answered

## 2021-10-16 LAB — GP B STREP DNA SPEC QL NAA+PROBE: NEGATIVE

## 2021-10-21 ENCOUNTER — ROUTINE PRENATAL (OUTPATIENT)
Dept: OBGYN | Facility: CLINIC | Age: 41
End: 2021-10-21
Payer: MEDICAID

## 2021-10-21 ENCOUNTER — NON-PROVIDER VISIT (OUTPATIENT)
Dept: OBGYN | Facility: CLINIC | Age: 41
End: 2021-10-21
Payer: MEDICAID

## 2021-10-21 VITALS — WEIGHT: 293 LBS | SYSTOLIC BLOOD PRESSURE: 142 MMHG | DIASTOLIC BLOOD PRESSURE: 80 MMHG | BODY MASS INDEX: 66.3 KG/M2

## 2021-10-21 DIAGNOSIS — O10.919 CHRONIC HYPERTENSION AFFECTING PREGNANCY: ICD-10-CM

## 2021-10-21 DIAGNOSIS — O09.523 HIGH RISK PREGNANCY, MULTIGRAVIDA OF ADVANCED MATERNAL AGE IN THIRD TRIMESTER: Primary | ICD-10-CM

## 2021-10-21 LAB
NST ACOUSTIC STIMULATION: NO
NST ACTION NECESSARY: NORMAL
NST ASSESSMENT: NORMAL
NST BASELINE: 145
NST INDICATIONS: NORMAL
NST OTHER DATA: NORMAL
NST READ BY: NORMAL
NST RETURN: NORMAL
NST UTERINE ACTIVITY: NO

## 2021-10-21 PROCEDURE — 90040 PR PRENATAL FOLLOW UP: CPT | Performed by: NURSE PRACTITIONER

## 2021-10-21 ASSESSMENT — FIBROSIS 4 INDEX: FIB4 SCORE: 0.56

## 2021-10-21 NOTE — PROGRESS NOTES
Pt. Here for OB/FU. Reports Good FM.   Good # [phone]  Pt. Denies VB, LOF, or UC's.   Pharmacy verified.   Chaperone offered and not needed.   GBS negative  Repeat C/S on 10/31/2021 @ 0930 am, pt notified and given instructions today.

## 2021-10-21 NOTE — PROGRESS NOTES
Pt without concerns today other than just really uncomfortable with the pregnancy.  Her last day of work is tomorrow.   Good FM, denies ctx, LOF or vag bleeding.  Denies pre-e s/s.  Normal BP here done by MA, though slightly elevated x1 on automatic machine attached to her NST, then all normal afterwards.    RTC twice weekly for NST.

## 2021-10-28 ENCOUNTER — NON-PROVIDER VISIT (OUTPATIENT)
Dept: OBGYN | Facility: CLINIC | Age: 41
End: 2021-10-28
Payer: MEDICAID

## 2021-10-28 ENCOUNTER — ROUTINE PRENATAL (OUTPATIENT)
Dept: OBGYN | Facility: CLINIC | Age: 41
End: 2021-10-28
Payer: MEDICAID

## 2021-10-28 ENCOUNTER — HOSPITAL ENCOUNTER (OUTPATIENT)
Dept: OBGYN | Facility: MEDICAL CENTER | Age: 41
End: 2021-10-28
Attending: OBSTETRICS & GYNECOLOGY
Payer: MEDICAID

## 2021-10-28 VITALS — WEIGHT: 293 LBS | DIASTOLIC BLOOD PRESSURE: 89 MMHG | BODY MASS INDEX: 66.4 KG/M2 | SYSTOLIC BLOOD PRESSURE: 142 MMHG

## 2021-10-28 DIAGNOSIS — Z98.891 HISTORY OF CESAREAN SECTION: ICD-10-CM

## 2021-10-28 DIAGNOSIS — O09.523 MULTIGRAVIDA OF ADVANCED MATERNAL AGE IN THIRD TRIMESTER: ICD-10-CM

## 2021-10-28 LAB
SARS-COV-2 RNA RESP QL NAA+PROBE: NOTDETECTED
SPECIMEN SOURCE: NORMAL

## 2021-10-28 PROCEDURE — 90040 PR PRENATAL FOLLOW UP: CPT | Performed by: OBSTETRICS & GYNECOLOGY

## 2021-10-28 PROCEDURE — U0003 INFECTIOUS AGENT DETECTION BY NUCLEIC ACID (DNA OR RNA); SEVERE ACUTE RESPIRATORY SYNDROME CORONAVIRUS 2 (SARS-COV-2) (CORONAVIRUS DISEASE [COVID-19]), AMPLIFIED PROBE TECHNIQUE, MAKING USE OF HIGH THROUGHPUT TECHNOLOGIES AS DESCRIBED BY CMS-2020-01-R: HCPCS

## 2021-10-28 PROCEDURE — U0005 INFEC AGEN DETEC AMPLI PROBE: HCPCS

## 2021-10-28 ASSESSMENT — FIBROSIS 4 INDEX: FIB4 SCORE: 0.56

## 2021-10-28 NOTE — NON-PROVIDER
OB follow up   + fetal movement.  No VB, LOF or UC's.  Phone # [phone] (home)   Preferred pharmacy confirmed.  C/o none

## 2021-10-31 ENCOUNTER — HOSPITAL ENCOUNTER (INPATIENT)
Facility: MEDICAL CENTER | Age: 41
LOS: 2 days | End: 2021-11-02
Attending: OBSTETRICS & GYNECOLOGY | Admitting: OBSTETRICS & GYNECOLOGY
Payer: MEDICAID

## 2021-10-31 ENCOUNTER — ANESTHESIA EVENT (OUTPATIENT)
Dept: OBGYN | Facility: MEDICAL CENTER | Age: 41
End: 2021-10-31
Payer: MEDICAID

## 2021-10-31 ENCOUNTER — ANESTHESIA (OUTPATIENT)
Dept: OBGYN | Facility: MEDICAL CENTER | Age: 41
End: 2021-10-31
Payer: MEDICAID

## 2021-10-31 DIAGNOSIS — G89.18 ACUTE POST-OPERATIVE PAIN: Primary | ICD-10-CM

## 2021-10-31 DIAGNOSIS — Z98.891 HISTORY OF CESAREAN SECTION: ICD-10-CM

## 2021-10-31 LAB
ACTION RH IMMUNE GLOB 8505RHG: NORMAL
BASOPHILS # BLD AUTO: 0.5 % (ref 0–1.8)
BASOPHILS # BLD: 0.07 K/UL (ref 0–0.12)
EOSINOPHIL # BLD AUTO: 0.11 K/UL (ref 0–0.51)
EOSINOPHIL NFR BLD: 0.9 % (ref 0–6.9)
ERYTHROCYTE [DISTWIDTH] IN BLOOD BY AUTOMATED COUNT: 36.5 FL (ref 35.9–50)
HCT VFR BLD AUTO: 35.8 % (ref 37–47)
HGB BLD-MCNC: 11.9 G/DL (ref 12–16)
HOLDING TUBE BB 8507: NORMAL
IMM GRANULOCYTES # BLD AUTO: 0.09 K/UL (ref 0–0.11)
IMM GRANULOCYTES NFR BLD AUTO: 0.7 % (ref 0–0.9)
IMMUNE ROSETTING TEST 8505FMH: NORMAL
LYMPHOCYTES # BLD AUTO: 2.7 K/UL (ref 1–4.8)
LYMPHOCYTES NFR BLD: 21 % (ref 22–41)
MCH RBC QN AUTO: 26.4 PG (ref 27–33)
MCHC RBC AUTO-ENTMCNC: 33.2 G/DL (ref 33.6–35)
MCV RBC AUTO: 79.6 FL (ref 81.4–97.8)
MONOCYTES # BLD AUTO: 0.57 K/UL (ref 0–0.85)
MONOCYTES NFR BLD AUTO: 4.4 % (ref 0–13.4)
NEUTROPHILS # BLD AUTO: 9.3 K/UL (ref 2–7.15)
NEUTROPHILS NFR BLD: 72.5 % (ref 44–72)
NRBC # BLD AUTO: 0 K/UL
NRBC BLD-RTO: 0 /100 WBC
NUMBER OF RH DOSES IND 8505RD: 1
PLATELET # BLD AUTO: 280 K/UL (ref 164–446)
PMV BLD AUTO: 9.4 FL (ref 9–12.9)
RBC # BLD AUTO: 4.5 M/UL (ref 4.2–5.4)
WBC # BLD AUTO: 12.8 K/UL (ref 4.8–10.8)

## 2021-10-31 PROCEDURE — 59510 CESAREAN DELIVERY: CPT | Performed by: OBSTETRICS & GYNECOLOGY

## 2021-10-31 PROCEDURE — 770002 HCHG ROOM/CARE - OB PRIVATE (112)

## 2021-10-31 PROCEDURE — 160009 HCHG ANES TIME/MIN: Performed by: OBSTETRICS & GYNECOLOGY

## 2021-10-31 PROCEDURE — 700105 HCHG RX REV CODE 258: Performed by: ANESTHESIOLOGY

## 2021-10-31 PROCEDURE — 160035 HCHG PACU - 1ST 60 MINS PHASE I: Performed by: OBSTETRICS & GYNECOLOGY

## 2021-10-31 PROCEDURE — 306287 HCHG RETRACTOR C SECTION XL: Performed by: OBSTETRICS & GYNECOLOGY

## 2021-10-31 PROCEDURE — 85461 HEMOGLOBIN FETAL: CPT

## 2021-10-31 PROCEDURE — 160048 HCHG OR STATISTICAL LEVEL 1-5: Performed by: OBSTETRICS & GYNECOLOGY

## 2021-10-31 PROCEDURE — 160029 HCHG SURGERY MINUTES - 1ST 30 MINS LEVEL 4: Performed by: OBSTETRICS & GYNECOLOGY

## 2021-10-31 PROCEDURE — 88304 TISSUE EXAM BY PATHOLOGIST: CPT

## 2021-10-31 PROCEDURE — 0UB50ZZ EXCISION OF RIGHT FALLOPIAN TUBE, OPEN APPROACH: ICD-10-PCS | Performed by: OBSTETRICS & GYNECOLOGY

## 2021-10-31 PROCEDURE — 160041 HCHG SURGERY MINUTES - EA ADDL 1 MIN LEVEL 4: Performed by: OBSTETRICS & GYNECOLOGY

## 2021-10-31 PROCEDURE — 700105 HCHG RX REV CODE 258: Performed by: STUDENT IN AN ORGANIZED HEALTH CARE EDUCATION/TRAINING PROGRAM

## 2021-10-31 PROCEDURE — A9270 NON-COVERED ITEM OR SERVICE: HCPCS | Performed by: STUDENT IN AN ORGANIZED HEALTH CARE EDUCATION/TRAINING PROGRAM

## 2021-10-31 PROCEDURE — 36415 COLL VENOUS BLD VENIPUNCTURE: CPT

## 2021-10-31 PROCEDURE — 700101 HCHG RX REV CODE 250: Performed by: STUDENT IN AN ORGANIZED HEALTH CARE EDUCATION/TRAINING PROGRAM

## 2021-10-31 PROCEDURE — A9270 NON-COVERED ITEM OR SERVICE: HCPCS | Performed by: ADVANCED PRACTICE MIDWIFE

## 2021-10-31 PROCEDURE — 700102 HCHG RX REV CODE 250 W/ 637 OVERRIDE(OP): Performed by: ADVANCED PRACTICE MIDWIFE

## 2021-10-31 PROCEDURE — 3E0334Z INTRODUCTION OF SERUM, TOXOID AND VACCINE INTO PERIPHERAL VEIN, PERCUTANEOUS APPROACH: ICD-10-PCS | Performed by: OBSTETRICS & GYNECOLOGY

## 2021-10-31 PROCEDURE — 700111 HCHG RX REV CODE 636 W/ 250 OVERRIDE (IP): Performed by: STUDENT IN AN ORGANIZED HEALTH CARE EDUCATION/TRAINING PROGRAM

## 2021-10-31 PROCEDURE — A9270 NON-COVERED ITEM OR SERVICE: HCPCS | Performed by: ANESTHESIOLOGY

## 2021-10-31 PROCEDURE — 700102 HCHG RX REV CODE 250 W/ 637 OVERRIDE(OP): Performed by: STUDENT IN AN ORGANIZED HEALTH CARE EDUCATION/TRAINING PROGRAM

## 2021-10-31 PROCEDURE — 160002 HCHG RECOVERY MINUTES (STAT): Performed by: OBSTETRICS & GYNECOLOGY

## 2021-10-31 PROCEDURE — 85025 COMPLETE CBC W/AUTO DIFF WBC: CPT

## 2021-10-31 PROCEDURE — 700111 HCHG RX REV CODE 636 W/ 250 OVERRIDE (IP): Performed by: ANESTHESIOLOGY

## 2021-10-31 PROCEDURE — 700102 HCHG RX REV CODE 250 W/ 637 OVERRIDE(OP): Performed by: ANESTHESIOLOGY

## 2021-10-31 RX ORDER — BUPIVACAINE HYDROCHLORIDE 7.5 MG/ML
INJECTION, SOLUTION INTRASPINAL
Status: DISCONTINUED | OUTPATIENT
Start: 2021-10-31 | End: 2021-10-31 | Stop reason: SURG

## 2021-10-31 RX ORDER — OXYCODONE HYDROCHLORIDE 10 MG/1
10 TABLET ORAL EVERY 4 HOURS PRN
Status: DISCONTINUED | OUTPATIENT
Start: 2021-10-31 | End: 2021-11-01

## 2021-10-31 RX ORDER — OXYTOCIN 10 [USP'U]/ML
INJECTION, SOLUTION INTRAMUSCULAR; INTRAVENOUS PRN
Status: DISCONTINUED | OUTPATIENT
Start: 2021-10-31 | End: 2021-10-31 | Stop reason: SURG

## 2021-10-31 RX ORDER — SODIUM CHLORIDE, SODIUM LACTATE, POTASSIUM CHLORIDE, CALCIUM CHLORIDE 600; 310; 30; 20 MG/100ML; MG/100ML; MG/100ML; MG/100ML
INJECTION, SOLUTION INTRAVENOUS PRN
Status: DISCONTINUED | OUTPATIENT
Start: 2021-10-31 | End: 2021-11-02 | Stop reason: HOSPADM

## 2021-10-31 RX ORDER — HYDROMORPHONE HYDROCHLORIDE 1 MG/ML
0.4 INJECTION, SOLUTION INTRAMUSCULAR; INTRAVENOUS; SUBCUTANEOUS
Status: DISCONTINUED | OUTPATIENT
Start: 2021-10-31 | End: 2021-11-01

## 2021-10-31 RX ORDER — ONDANSETRON 2 MG/ML
4 INJECTION INTRAMUSCULAR; INTRAVENOUS EVERY 6 HOURS PRN
Status: DISCONTINUED | OUTPATIENT
Start: 2021-10-31 | End: 2021-11-01

## 2021-10-31 RX ORDER — SODIUM CHLORIDE, SODIUM LACTATE, POTASSIUM CHLORIDE, CALCIUM CHLORIDE 600; 310; 30; 20 MG/100ML; MG/100ML; MG/100ML; MG/100ML
INJECTION, SOLUTION INTRAVENOUS CONTINUOUS
Status: DISCONTINUED | OUTPATIENT
Start: 2021-10-31 | End: 2021-11-01

## 2021-10-31 RX ORDER — DIPHENHYDRAMINE HYDROCHLORIDE 50 MG/ML
12.5 INJECTION INTRAMUSCULAR; INTRAVENOUS EVERY 6 HOURS PRN
Status: DISCONTINUED | OUTPATIENT
Start: 2021-10-31 | End: 2021-11-01

## 2021-10-31 RX ORDER — HYDROMORPHONE HYDROCHLORIDE 1 MG/ML
0.1 INJECTION, SOLUTION INTRAMUSCULAR; INTRAVENOUS; SUBCUTANEOUS
Status: DISCONTINUED | OUTPATIENT
Start: 2021-10-31 | End: 2021-10-31 | Stop reason: HOSPADM

## 2021-10-31 RX ORDER — DEXAMETHASONE SODIUM PHOSPHATE 4 MG/ML
INJECTION, SOLUTION INTRA-ARTICULAR; INTRALESIONAL; INTRAMUSCULAR; INTRAVENOUS; SOFT TISSUE PRN
Status: DISCONTINUED | OUTPATIENT
Start: 2021-10-31 | End: 2021-10-31 | Stop reason: SURG

## 2021-10-31 RX ORDER — ONDANSETRON 2 MG/ML
INJECTION INTRAMUSCULAR; INTRAVENOUS PRN
Status: DISCONTINUED | OUTPATIENT
Start: 2021-10-31 | End: 2021-10-31 | Stop reason: SURG

## 2021-10-31 RX ORDER — METOCLOPRAMIDE HYDROCHLORIDE 5 MG/ML
10 INJECTION INTRAMUSCULAR; INTRAVENOUS ONCE
Status: COMPLETED | OUTPATIENT
Start: 2021-10-31 | End: 2021-10-31

## 2021-10-31 RX ORDER — OXYCODONE HCL 5 MG/5 ML
10 SOLUTION, ORAL ORAL
Status: DISCONTINUED | OUTPATIENT
Start: 2021-10-31 | End: 2021-10-31 | Stop reason: HOSPADM

## 2021-10-31 RX ORDER — MISOPROSTOL 200 UG/1
800 TABLET ORAL
Status: DISCONTINUED | OUTPATIENT
Start: 2021-10-31 | End: 2021-11-02 | Stop reason: HOSPADM

## 2021-10-31 RX ORDER — HYDROMORPHONE HYDROCHLORIDE 1 MG/ML
0.2 INJECTION, SOLUTION INTRAMUSCULAR; INTRAVENOUS; SUBCUTANEOUS
Status: DISCONTINUED | OUTPATIENT
Start: 2021-10-31 | End: 2021-10-31 | Stop reason: HOSPADM

## 2021-10-31 RX ORDER — KETOROLAC TROMETHAMINE 30 MG/ML
INJECTION, SOLUTION INTRAMUSCULAR; INTRAVENOUS PRN
Status: DISCONTINUED | OUTPATIENT
Start: 2021-10-31 | End: 2021-10-31 | Stop reason: SURG

## 2021-10-31 RX ORDER — KETOROLAC TROMETHAMINE 30 MG/ML
30 INJECTION, SOLUTION INTRAMUSCULAR; INTRAVENOUS EVERY 6 HOURS
Status: DISCONTINUED | OUTPATIENT
Start: 2021-10-31 | End: 2021-11-01

## 2021-10-31 RX ORDER — HALOPERIDOL 5 MG/ML
1 INJECTION INTRAMUSCULAR
Status: DISCONTINUED | OUTPATIENT
Start: 2021-10-31 | End: 2021-10-31 | Stop reason: HOSPADM

## 2021-10-31 RX ORDER — VITAMIN A ACETATE, BETA CAROTENE, ASCORBIC ACID, CHOLECALCIFEROL, .ALPHA.-TOCOPHEROL ACETATE, DL-, THIAMINE MONONITRATE, RIBOFLAVIN, NIACINAMIDE, PYRIDOXINE HYDROCHLORIDE, FOLIC ACID, CYANOCOBALAMIN, CALCIUM CARBONATE, FERROUS FUMARATE, ZINC OXIDE, CUPRIC OXIDE 3080; 12; 120; 400; 1; 1.84; 3; 20; 22; 920; 25; 200; 27; 10; 2 [IU]/1; UG/1; MG/1; [IU]/1; MG/1; MG/1; MG/1; MG/1; MG/1; [IU]/1; MG/1; MG/1; MG/1; MG/1; MG/1
1 TABLET, FILM COATED ORAL EVERY MORNING
Status: DISCONTINUED | OUTPATIENT
Start: 2021-11-01 | End: 2021-11-02 | Stop reason: HOSPADM

## 2021-10-31 RX ORDER — MORPHINE SULFATE 0.5 MG/ML
INJECTION, SOLUTION EPIDURAL; INTRATHECAL; INTRAVENOUS PRN
Status: DISCONTINUED | OUTPATIENT
Start: 2021-10-31 | End: 2021-10-31 | Stop reason: SURG

## 2021-10-31 RX ORDER — HYDROMORPHONE HYDROCHLORIDE 1 MG/ML
0.4 INJECTION, SOLUTION INTRAMUSCULAR; INTRAVENOUS; SUBCUTANEOUS
Status: DISCONTINUED | OUTPATIENT
Start: 2021-10-31 | End: 2021-10-31 | Stop reason: HOSPADM

## 2021-10-31 RX ORDER — ONDANSETRON 2 MG/ML
4 INJECTION INTRAMUSCULAR; INTRAVENOUS
Status: DISCONTINUED | OUTPATIENT
Start: 2021-10-31 | End: 2021-10-31 | Stop reason: HOSPADM

## 2021-10-31 RX ORDER — SODIUM CHLORIDE, SODIUM GLUCONATE, SODIUM ACETATE, POTASSIUM CHLORIDE AND MAGNESIUM CHLORIDE 526; 502; 368; 37; 30 MG/100ML; MG/100ML; MG/100ML; MG/100ML; MG/100ML
INJECTION, SOLUTION INTRAVENOUS
Status: DISCONTINUED | OUTPATIENT
Start: 2021-10-31 | End: 2021-10-31 | Stop reason: SURG

## 2021-10-31 RX ORDER — SIMETHICONE 80 MG
80 TABLET,CHEWABLE ORAL 4 TIMES DAILY PRN
Status: DISCONTINUED | OUTPATIENT
Start: 2021-10-31 | End: 2021-11-02 | Stop reason: HOSPADM

## 2021-10-31 RX ORDER — LIDOCAINE HCL/EPINEPHRINE/PF 2%-1:200K
VIAL (ML) INJECTION PRN
Status: DISCONTINUED | OUTPATIENT
Start: 2021-10-31 | End: 2021-10-31 | Stop reason: SURG

## 2021-10-31 RX ORDER — DOCUSATE SODIUM 100 MG/1
100 CAPSULE, LIQUID FILLED ORAL 2 TIMES DAILY PRN
Status: DISCONTINUED | OUTPATIENT
Start: 2021-10-31 | End: 2021-11-02 | Stop reason: HOSPADM

## 2021-10-31 RX ORDER — SODIUM CHLORIDE, SODIUM GLUCONATE, SODIUM ACETATE, POTASSIUM CHLORIDE AND MAGNESIUM CHLORIDE 526; 502; 368; 37; 30 MG/100ML; MG/100ML; MG/100ML; MG/100ML; MG/100ML
1500 INJECTION, SOLUTION INTRAVENOUS ONCE
Status: COMPLETED | OUTPATIENT
Start: 2021-10-31 | End: 2021-10-31

## 2021-10-31 RX ORDER — FLUOXETINE HYDROCHLORIDE 20 MG/1
20 CAPSULE ORAL DAILY
Status: DISCONTINUED | OUTPATIENT
Start: 2021-10-31 | End: 2021-11-02 | Stop reason: HOSPADM

## 2021-10-31 RX ORDER — DIPHENHYDRAMINE HYDROCHLORIDE 50 MG/ML
12.5 INJECTION INTRAMUSCULAR; INTRAVENOUS
Status: DISCONTINUED | OUTPATIENT
Start: 2021-10-31 | End: 2021-10-31 | Stop reason: HOSPADM

## 2021-10-31 RX ORDER — DIPHENHYDRAMINE HYDROCHLORIDE 50 MG/ML
25 INJECTION INTRAMUSCULAR; INTRAVENOUS EVERY 6 HOURS PRN
Status: DISCONTINUED | OUTPATIENT
Start: 2021-10-31 | End: 2021-11-01

## 2021-10-31 RX ORDER — HYDROMORPHONE HYDROCHLORIDE 1 MG/ML
0.2 INJECTION, SOLUTION INTRAMUSCULAR; INTRAVENOUS; SUBCUTANEOUS
Status: DISCONTINUED | OUTPATIENT
Start: 2021-10-31 | End: 2021-11-01

## 2021-10-31 RX ORDER — SCOLOPAMINE TRANSDERMAL SYSTEM 1 MG/1
PATCH, EXTENDED RELEASE TRANSDERMAL PRN
Status: DISCONTINUED | OUTPATIENT
Start: 2021-10-31 | End: 2021-10-31 | Stop reason: SURG

## 2021-10-31 RX ORDER — OXYCODONE HCL 5 MG/5 ML
5 SOLUTION, ORAL ORAL
Status: DISCONTINUED | OUTPATIENT
Start: 2021-10-31 | End: 2021-10-31 | Stop reason: HOSPADM

## 2021-10-31 RX ORDER — ACETAMINOPHEN 500 MG
1000 TABLET ORAL EVERY 6 HOURS
Status: COMPLETED | OUTPATIENT
Start: 2021-10-31 | End: 2021-11-01

## 2021-10-31 RX ORDER — CEFAZOLIN SODIUM 1 G/3ML
INJECTION, POWDER, FOR SOLUTION INTRAMUSCULAR; INTRAVENOUS PRN
Status: DISCONTINUED | OUTPATIENT
Start: 2021-10-31 | End: 2021-10-31 | Stop reason: SURG

## 2021-10-31 RX ORDER — OXYCODONE HYDROCHLORIDE 5 MG/1
5 TABLET ORAL EVERY 4 HOURS PRN
Status: DISCONTINUED | OUTPATIENT
Start: 2021-10-31 | End: 2021-11-01

## 2021-10-31 RX ORDER — CITRIC ACID/SODIUM CITRATE 334-500MG
30 SOLUTION, ORAL ORAL ONCE
Status: COMPLETED | OUTPATIENT
Start: 2021-10-31 | End: 2021-10-31

## 2021-10-31 RX ADMIN — FAMOTIDINE 20 MG: 10 INJECTION INTRAVENOUS at 09:28

## 2021-10-31 RX ADMIN — METOCLOPRAMIDE 10 MG: 5 INJECTION, SOLUTION INTRAMUSCULAR; INTRAVENOUS at 09:28

## 2021-10-31 RX ADMIN — DEXAMETHASONE SODIUM PHOSPHATE 8 MG: 4 INJECTION, SOLUTION INTRA-ARTICULAR; INTRALESIONAL; INTRAMUSCULAR; INTRAVENOUS; SOFT TISSUE at 11:28

## 2021-10-31 RX ADMIN — SODIUM CHLORIDE, SODIUM GLUCONATE, SODIUM ACETATE, POTASSIUM CHLORIDE AND MAGNESIUM CHLORIDE 1500 ML: 526; 502; 368; 37; 30 INJECTION, SOLUTION INTRAVENOUS at 09:29

## 2021-10-31 RX ADMIN — ACETAMINOPHEN 1000 MG: 500 TABLET ORAL at 22:12

## 2021-10-31 RX ADMIN — OXYTOCIN 20 UNITS: 10 INJECTION, SOLUTION INTRAMUSCULAR; INTRAVENOUS at 11:46

## 2021-10-31 RX ADMIN — ONDANSETRON 4 MG: 2 INJECTION INTRAMUSCULAR; INTRAVENOUS at 11:28

## 2021-10-31 RX ADMIN — ACETAMINOPHEN 1000 MG: 500 TABLET ORAL at 16:18

## 2021-10-31 RX ADMIN — SODIUM CITRATE AND CITRIC ACID MONOHYDRATE 30 ML: 500; 334 SOLUTION ORAL at 09:27

## 2021-10-31 RX ADMIN — KETOROLAC TROMETHAMINE 30 MG: 30 INJECTION, SOLUTION INTRAMUSCULAR at 18:43

## 2021-10-31 RX ADMIN — CEFAZOLIN 3 G: 330 INJECTION, POWDER, FOR SOLUTION INTRAMUSCULAR; INTRAVENOUS at 11:32

## 2021-10-31 RX ADMIN — FENTANYL CITRATE 10 MCG: 50 INJECTION, SOLUTION INTRAMUSCULAR; INTRAVENOUS at 11:18

## 2021-10-31 RX ADMIN — SODIUM CHLORIDE, SODIUM GLUCONATE, SODIUM ACETATE, POTASSIUM CHLORIDE AND MAGNESIUM CHLORIDE: 526; 502; 368; 37; 30 INJECTION, SOLUTION INTRAVENOUS at 09:41

## 2021-10-31 RX ADMIN — MORPHINE SULFATE 1 MG: 0.5 INJECTION, SOLUTION EPIDURAL; INTRATHECAL; INTRAVENOUS at 12:10

## 2021-10-31 RX ADMIN — SCOPALAMINE 1 PATCH: 1 PATCH, EXTENDED RELEASE TRANSDERMAL at 11:42

## 2021-10-31 RX ADMIN — LIDOCAINE HYDROCHLORIDE,EPINEPHRINE BITARTRATE 5 ML: 20; .005 INJECTION, SOLUTION EPIDURAL; INFILTRATION; INTRACAUDAL; PERINEURAL at 11:43

## 2021-10-31 RX ADMIN — KETOROLAC TROMETHAMINE 30 MG: 30 INJECTION, SOLUTION INTRAMUSCULAR at 12:10

## 2021-10-31 RX ADMIN — EPHEDRINE SULFATE 5 MG: 50 INJECTION, SOLUTION INTRAVENOUS at 11:36

## 2021-10-31 RX ADMIN — BUPIVACAINE HYDROCHLORIDE IN DEXTROSE 1 ML: 7.5 INJECTION, SOLUTION SUBARACHNOID at 11:18

## 2021-10-31 RX ADMIN — FLUOXETINE 20 MG: 20 CAPSULE ORAL at 23:58

## 2021-10-31 RX ADMIN — LIDOCAINE HYDROCHLORIDE,EPINEPHRINE BITARTRATE 5 ML: 20; .005 INJECTION, SOLUTION EPIDURAL; INFILTRATION; INTRACAUDAL; PERINEURAL at 11:48

## 2021-10-31 ASSESSMENT — PATIENT HEALTH QUESTIONNAIRE - PHQ9
SUM OF ALL RESPONSES TO PHQ9 QUESTIONS 1 AND 2: 0
1. LITTLE INTEREST OR PLEASURE IN DOING THINGS: NOT AT ALL
2. FEELING DOWN, DEPRESSED, IRRITABLE, OR HOPELESS: NOT AT ALL

## 2021-10-31 ASSESSMENT — LIFESTYLE VARIABLES
DOES PATIENT WANT TO STOP DRINKING: NO
TOTAL SCORE: 0
EVER_SMOKED: YES
HAVE PEOPLE ANNOYED YOU BY CRITICIZING YOUR DRINKING: NO
EVER FELT BAD OR GUILTY ABOUT YOUR DRINKING: NO
EVER HAD A DRINK FIRST THING IN THE MORNING TO STEADY YOUR NERVES TO GET RID OF A HANGOVER: NO
HAVE YOU EVER FELT YOU SHOULD CUT DOWN ON YOUR DRINKING: NO
TOTAL SCORE: 0
ALCOHOL_USE: NO
TOTAL SCORE: 0
CONSUMPTION TOTAL: INCOMPLETE

## 2021-10-31 ASSESSMENT — COPD QUESTIONNAIRES
IN THE PAST 12 MONTHS DO YOU DO LESS THAN YOU USED TO BECAUSE OF YOUR BREATHING PROBLEMS: DISAGREE/UNSURE
DURING THE PAST 4 WEEKS HOW MUCH DID YOU FEEL SHORT OF BREATH: NONE/LITTLE OF THE TIME
DO YOU EVER COUGH UP ANY MUCUS OR PHLEGM?: NO/ONLY WITH OCCASIONAL COLDS OR INFECTIONS
HAVE YOU SMOKED AT LEAST 100 CIGARETTES IN YOUR ENTIRE LIFE: NO/DON'T KNOW
COPD SCREENING SCORE: 0

## 2021-10-31 ASSESSMENT — PAIN DESCRIPTION - PAIN TYPE
TYPE: ACUTE PAIN;SURGICAL PAIN
TYPE: ACUTE PAIN
TYPE: ACUTE PAIN;SURGICAL PAIN
TYPE: ACUTE PAIN
TYPE: ACUTE PAIN;SURGICAL PAIN

## 2021-10-31 ASSESSMENT — PAIN SCALES - GENERAL
PAINLEVEL: 0 - NO PAIN
PAIN_LEVEL: 0

## 2021-10-31 ASSESSMENT — FIBROSIS 4 INDEX: FIB4 SCORE: 0.56
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
## 2021-10-31 NOTE — PROGRESS NOTES
0731: Pt presents to L&D for scheduled repeat c/s. Pt is a  at 39.1 weeks gestation. POC discussed, pt and FOB Ilia verbalized understanding.  0944: Pt ambulated to OR1  Dr. Reilly unable to get spinal anesthesia  1100: Dr. Nguyen to OR  1146: Viable male per Dr. Fagan, infant assigned 8/9 APGARS  1223: Pt to PACU in stable condition  1340: Pt and infant transferred to postpartum via gurney, report given to Rufina FERRERA RN. Pt in stable condition with a firm fundus and light lochia, ID bands verified.

## 2021-10-31 NOTE — ANESTHESIA TIME REPORT
Anesthesia Start and Stop Event Times     Date Time Event    10/31/2021 0907 Ready for Procedure     0941 Anesthesia Start     1230 Anesthesia Stop        Responsible Staff  10/31/21    Name Role Begin End    Donna Reilly M.D. Anesth 0941 1057    Min DANIELE Nguyen M.D. Anesth 1057 1230        Preop Diagnosis (Free Text):  Pre-op Diagnosis     PREVIOUS  SECTION; 39 + 1 WEEKS GESTATION        Preop Diagnosis (Codes):  Diagnosis Information     Diagnosis Code(s): Status post repeat low transverse  section [Z98.891]        Premium Reason  E. Weekend    Comments:

## 2021-10-31 NOTE — ANESTHESIA POSTPROCEDURE EVALUATION
Patient: Sonya Flor    Procedure Summary     Date: 10/31/21 Room / Location: [facility] OR 01 / [facility]ERY [facility]    Anesthesia Start: 941 Anesthesia Stop:     Procedure:  SECTION, REPEAT (Abdomen) Diagnosis:       Status post repeat low transverse  section      (repeat  section, delivered)    Surgeons: Anmol Fagan M.D. Responsible Provider: Owen Nguyen M.D.    Anesthesia Type: epidural ASA Status: 3          Final Anesthesia Type: epidural  Last vitals  BP   Blood Pressure: (!) 95/52    Temp   35.9 °C (96.6 °F)    Pulse   94   Resp   (!) 21    SpO2   94 %      Anesthesia Post Evaluation    Patient location during evaluation: PACU  Patient participation: complete - patient participated  Level of consciousness: awake and alert  Pain score: 0    Airway patency: patent  Anesthetic complications: no  Cardiovascular status: hemodynamically stable  Respiratory status: acceptable  Hydration status: euvolemic    PONV: none    patient able to participate, but full recovery from regional anesthesia has not occurred and is not expected within the stipulated timeframe for the completion of the evaluation      No complications documented.     Nurse Pain Score: 0 (NPRS)

## 2021-10-31 NOTE — OP REPORT
DATE OF SERVICE: 2021     PREOPERATIVE DIAGNOSES:  1.  Intrauterine pregnancy at 39w1d  2.  Prior  x2     POSTOPERATIVE DIAGNOSES:  1.  Intrauterine pregnancy at 39w1d  2.  same    PROCEDURE PERFORMED: Repeat low transverse  section.     SURGEON:  Anmol Fagan MD     ASSISTANT: CARLY Clifton MD     ANESTHESIA:  Spinal.     ANESTHESIOLOGIST:  FLETCHER Nguyen MD     SPECIMEN: Right paratubal cyst     ESTIMATED BLOOD LOSS: 750 mL     FINDINGS:  A viable male infant, weight 3520 g, Apgars of 8 and 9 in vertex    presentation with clear amniotic fluid.  There was a normal uterus,   tubes, and ovaries bilaterally with the exception of a 2 cm right paratubal cyst.     COMPLICATIONS:  None.     PROCEDURE:  After appropriate consents were obtained, the patient was taken to the operating room where spinal  anesthesia was applied without complications.  The patient was then prepped and draped in the usual sterile manner.  Clamp test on the skin verified adequate anesthesia.  A Pfannenstiel incision was made with a scalpel 3cm superior to the pubic symphysis and extended down to the rectus fascia.  The rectus fascia was incised with the scalpel and tented up. The underlying rectus muscle was  from the fascia first inferiorly and then superiorly using the baldwin scissors.  The rectus muscle was  bluntly in the midline.  The peritoneum was entered bluntly in the midline. The peritoneum incision was extended superiorly and inferiorly with the Metzenbaum scissors with great care to avoid injury to underlying bowel or bladder.  Dudley retractor was placed. The vesicouterine peritoneum was tented up and entered with Metzenbaum scissors, and a bladder flap was created.  There was noted to be a very thin lower uterine segment.  An incision was made into the lower uterine segment transversely and the incision was extended bluntly.  Amniotomy was performed and there was noted to be clear amniotic  fluid. The Infant's head was grasped and delivered atraumatically followed by the remainder of the body without any complications.  The mouth and nares were suctioned. The cord was doubly clamped and cut and the infant was handed off to awaiting neonatology team.  The placenta was then allowed to spontaneously deliver. The uterus was cleared of clots and debris.  The hysterotomy incision was reapproximated with 1-0 Vicryl suture in a running locked fashion. Hemostasis was noted.  The tubes and ovaries were examined and noted to be normal.  The pelvis was irrigated with normal saline. The pericolic gutters were examined and any blood clots were removed.  There was noted to be an approximately 2 cm right paratubal cyst.  This was removed and sent to pathology for evaluation.  The Dudley retractor was removed. The peritoneum was reapproximated with 3-0 Vicryl suture running.  The rectus muscles were examined and hemostatic.  The fascia was reapproximated with 0 Vicryl suture running.  The subcutaneous fat was irrigated and any small bleeders were bovied for hemostasis.  The subcutaneous fat was then reapproximated with 3-0 Vicryl suture running.  The skin was reapproximated with surgical staples and a Prevena wound management system applied.   Sponge, needle, instrument, and lap counts were correct x2.  Patient tolerated the procedure well and went to recovery room in stable condition.        ____________________________________    Anmol Fagan MD
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
## 2021-10-31 NOTE — ANESTHESIA PROCEDURE NOTES
Spinal Block    Date/Time: 10/31/2021 11:18 AM  Performed by: Owen Nguyen M.D.  Authorized by: Owen Nguyen M.D.     Patient Location:  OR  Start Time:  10/31/2021 11:18 AM  End Time:  10/31/2021 11:24 AM  Reason for Block: primary anesthetic    patient identified, IV checked, site marked, risks and benefits discussed, surgical consent, monitors and equipment checked, pre-op evaluation and timeout performed    Patient Position:  Sitting  Prep: ChloraPrep, patient draped and sterile technique    Monitoring:  Blood pressure, continuous pulse oximetry and heart rate  Approach:  Midline  Location:  L1-2  Injection Technique:  Single-shot  Skin infiltration:  Lidocaine  Strength:  1%  Dose:  3ml  Needle Type:  Other (Margy Arjun)  Needle gauge: 17G.  CSF flowing pre/post injection:  Yes  Sensory Level:  T6   Success on second pass. Did CSE using Margy Arjun. Got SIVAKUMAR at 9cm. Used 25G spinal needle and got CSF return. Injected spinal meds (1mL spinal bupiv and 10mcg Fentanyl). Threaded epidural catheter (15cm at skin).

## 2021-10-31 NOTE — OR SURGEON
Immediate Post OP Note    PreOp Diagnosis: Intrauterine pregnancy at 39 weeks 1 day gestational age.  Prior  x2.      PostOp Diagnosis: Same      Procedure(s):   SECTION, REPEAT - Wound Class: Clean Contaminated    Surgeon(s):  Amnol Fagan M.D.    Anesthesiologist/Type of Anesthesia:  Anesthesiologist: Donna Reilly M.D.; Owen Nguyen M.D./Spinal    Surgical Staff:  Circulator: Pauly Fairchild R.N.  Scrub Person: Dina Willett; Julita Medina  L&JAMAR Circulator Assistant: Irlanda Colón R.N.  L&JAMAR Baby  Nurse: Emma Horan R.N.    Specimens removed if any:  Right paratubal cyst    Estimated Blood Loss: 750 mL    Findings: Male infant, Apgars 8 9, weight 3520 g.  None placenta with three-vessel cord.  Thin lower uterine segment noted at the time of     Complications: None        10/31/2021 12:45 PM Anmol Fagan M.D.

## 2021-11-01 LAB
ERYTHROCYTE [DISTWIDTH] IN BLOOD BY AUTOMATED COUNT: 38.1 FL (ref 35.9–50)
HCT VFR BLD AUTO: 31.8 % (ref 37–47)
HGB BLD-MCNC: 10.1 G/DL (ref 12–16)
MCH RBC QN AUTO: 26 PG (ref 27–33)
MCHC RBC AUTO-ENTMCNC: 31.8 G/DL (ref 33.6–35)
MCV RBC AUTO: 82 FL (ref 81.4–97.8)
PATHOLOGY CONSULT NOTE: NORMAL
PLATELET # BLD AUTO: 278 K/UL (ref 164–446)
PMV BLD AUTO: 9.5 FL (ref 9–12.9)
RBC # BLD AUTO: 3.88 M/UL (ref 4.2–5.4)
WBC # BLD AUTO: 16.5 K/UL (ref 4.8–10.8)

## 2021-11-01 PROCEDURE — 700111 HCHG RX REV CODE 636 W/ 250 OVERRIDE (IP): Performed by: OBSTETRICS & GYNECOLOGY

## 2021-11-01 PROCEDURE — 700102 HCHG RX REV CODE 250 W/ 637 OVERRIDE(OP): Performed by: ANESTHESIOLOGY

## 2021-11-01 PROCEDURE — A9270 NON-COVERED ITEM OR SERVICE: HCPCS | Performed by: ANESTHESIOLOGY

## 2021-11-01 PROCEDURE — 302151 K-PAD 14X20: Performed by: OBSTETRICS & GYNECOLOGY

## 2021-11-01 PROCEDURE — 770002 HCHG ROOM/CARE - OB PRIVATE (112)

## 2021-11-01 PROCEDURE — 302131 K PAD MOTOR: Performed by: OBSTETRICS & GYNECOLOGY

## 2021-11-01 PROCEDURE — 700111 HCHG RX REV CODE 636 W/ 250 OVERRIDE (IP): Performed by: ANESTHESIOLOGY

## 2021-11-01 PROCEDURE — A9270 NON-COVERED ITEM OR SERVICE: HCPCS | Performed by: FAMILY MEDICINE

## 2021-11-01 PROCEDURE — 700102 HCHG RX REV CODE 250 W/ 637 OVERRIDE(OP): Performed by: FAMILY MEDICINE

## 2021-11-01 PROCEDURE — 36415 COLL VENOUS BLD VENIPUNCTURE: CPT

## 2021-11-01 PROCEDURE — 85027 COMPLETE CBC AUTOMATED: CPT

## 2021-11-01 RX ORDER — ONDANSETRON 4 MG/1
4 TABLET, ORALLY DISINTEGRATING ORAL EVERY 6 HOURS PRN
Status: DISCONTINUED | OUTPATIENT
Start: 2021-11-01 | End: 2021-11-02 | Stop reason: HOSPADM

## 2021-11-01 RX ORDER — ONDANSETRON 2 MG/ML
4 INJECTION INTRAMUSCULAR; INTRAVENOUS EVERY 6 HOURS PRN
Status: DISCONTINUED | OUTPATIENT
Start: 2021-11-01 | End: 2021-11-02 | Stop reason: HOSPADM

## 2021-11-01 RX ORDER — OXYCODONE HYDROCHLORIDE 10 MG/1
10 TABLET ORAL EVERY 4 HOURS PRN
Status: DISCONTINUED | OUTPATIENT
Start: 2021-11-01 | End: 2021-11-02 | Stop reason: HOSPADM

## 2021-11-01 RX ORDER — IBUPROFEN 600 MG/1
600 TABLET ORAL EVERY 6 HOURS PRN
Status: DISCONTINUED | OUTPATIENT
Start: 2021-11-01 | End: 2021-11-02 | Stop reason: HOSPADM

## 2021-11-01 RX ORDER — ACETAMINOPHEN 500 MG
1000 TABLET ORAL EVERY 6 HOURS PRN
Status: DISCONTINUED | OUTPATIENT
Start: 2021-11-01 | End: 2021-11-02 | Stop reason: HOSPADM

## 2021-11-01 RX ORDER — MORPHINE SULFATE 10 MG/ML
2 INJECTION, SOLUTION INTRAMUSCULAR; INTRAVENOUS
Status: DISCONTINUED | OUTPATIENT
Start: 2021-11-01 | End: 2021-11-02 | Stop reason: HOSPADM

## 2021-11-01 RX ORDER — DIPHENHYDRAMINE HCL 25 MG
25 TABLET ORAL EVERY 6 HOURS PRN
Status: DISCONTINUED | OUTPATIENT
Start: 2021-11-01 | End: 2021-11-02 | Stop reason: HOSPADM

## 2021-11-01 RX ORDER — OXYCODONE HYDROCHLORIDE AND ACETAMINOPHEN 5; 325 MG/1; MG/1
1 TABLET ORAL EVERY 4 HOURS PRN
Status: DISCONTINUED | OUTPATIENT
Start: 2021-11-01 | End: 2021-11-01

## 2021-11-01 RX ORDER — OXYCODONE HYDROCHLORIDE 5 MG/1
5 TABLET ORAL EVERY 4 HOURS PRN
Status: DISCONTINUED | OUTPATIENT
Start: 2021-11-01 | End: 2021-11-02 | Stop reason: HOSPADM

## 2021-11-01 RX ORDER — KETOROLAC TROMETHAMINE 30 MG/ML
30 INJECTION, SOLUTION INTRAMUSCULAR; INTRAVENOUS EVERY 6 HOURS
Status: DISCONTINUED | OUTPATIENT
Start: 2021-11-01 | End: 2021-11-01

## 2021-11-01 RX ORDER — OXYCODONE AND ACETAMINOPHEN 10; 325 MG/1; MG/1
1 TABLET ORAL EVERY 4 HOURS PRN
Status: DISCONTINUED | OUTPATIENT
Start: 2021-11-01 | End: 2021-11-01

## 2021-11-01 RX ORDER — IBUPROFEN 800 MG/1
800 TABLET ORAL EVERY 8 HOURS PRN
Status: DISCONTINUED | OUTPATIENT
Start: 2021-11-01 | End: 2021-11-01

## 2021-11-01 RX ORDER — DIPHENHYDRAMINE HYDROCHLORIDE 50 MG/ML
25 INJECTION INTRAMUSCULAR; INTRAVENOUS EVERY 6 HOURS PRN
Status: DISCONTINUED | OUTPATIENT
Start: 2021-11-01 | End: 2021-11-02 | Stop reason: HOSPADM

## 2021-11-01 RX ORDER — ACETAMINOPHEN 325 MG/1
325 TABLET ORAL EVERY 4 HOURS PRN
Status: DISCONTINUED | OUTPATIENT
Start: 2021-11-01 | End: 2021-11-01

## 2021-11-01 RX ADMIN — ACETAMINOPHEN 1000 MG: 500 TABLET ORAL at 23:11

## 2021-11-01 RX ADMIN — OXYCODONE 5 MG: 5 TABLET ORAL at 14:42

## 2021-11-01 RX ADMIN — ACETAMINOPHEN 1000 MG: 500 TABLET ORAL at 10:26

## 2021-11-01 RX ADMIN — ACETAMINOPHEN 1000 MG: 500 TABLET ORAL at 16:42

## 2021-11-01 RX ADMIN — ENOXAPARIN SODIUM 40 MG: 40 INJECTION SUBCUTANEOUS at 07:44

## 2021-11-01 RX ADMIN — OXYCODONE 5 MG: 5 TABLET ORAL at 23:11

## 2021-11-01 RX ADMIN — ACETAMINOPHEN 1000 MG: 500 TABLET ORAL at 04:08

## 2021-11-01 RX ADMIN — IBUPROFEN 600 MG: 600 TABLET, FILM COATED ORAL at 20:03

## 2021-11-01 RX ADMIN — KETOROLAC TROMETHAMINE 30 MG: 30 INJECTION, SOLUTION INTRAMUSCULAR at 01:04

## 2021-11-01 RX ADMIN — OXYCODONE 5 MG: 5 TABLET ORAL at 19:01

## 2021-11-01 RX ADMIN — KETOROLAC TROMETHAMINE 30 MG: 30 INJECTION, SOLUTION INTRAMUSCULAR at 07:43

## 2021-11-01 RX ADMIN — OXYCODONE 5 MG: 5 TABLET ORAL at 10:34

## 2021-11-01 RX ADMIN — IBUPROFEN 600 MG: 600 TABLET, FILM COATED ORAL at 13:49

## 2021-11-01 ASSESSMENT — EDINBURGH POSTNATAL DEPRESSION SCALE (EPDS)
I HAVE BEEN ANXIOUS OR WORRIED FOR NO GOOD REASON: HARDLY EVER
THINGS HAVE BEEN GETTING ON TOP OF ME: NO, I HAVE BEEN COPING AS WELL AS EVER
THE THOUGHT OF HARMING MYSELF HAS OCCURRED TO ME: NEVER
I HAVE BEEN ABLE TO LAUGH AND SEE THE FUNNY SIDE OF THINGS: AS MUCH AS I ALWAYS COULD
I HAVE BEEN SO UNHAPPY THAT I HAVE BEEN CRYING: ONLY OCCASIONALLY
I HAVE BLAMED MYSELF UNNECESSARILY WHEN THINGS WENT WRONG: NOT VERY OFTEN
I HAVE FELT SAD OR MISERABLE: NOT VERY OFTEN
I HAVE FELT SCARED OR PANICKY FOR NO GOOD REASON: NO, NOT AT ALL
I HAVE BEEN SO UNHAPPY THAT I HAVE HAD DIFFICULTY SLEEPING: NOT VERY OFTEN
I HAVE LOOKED FORWARD WITH ENJOYMENT TO THINGS: AS MUCH AS I EVER DID

## 2021-11-01 ASSESSMENT — PAIN DESCRIPTION - PAIN TYPE
TYPE: SURGICAL PAIN

## 2021-11-01 NOTE — PROGRESS NOTES
Patient assessed VS stable. Pain reported as 0/10 on pains scale. Breasts soft. Fundus firm at U, lochia light rubra. Wound vac in place with good suction. Legs show no signs of swelling, heat, redness, pain. All questions and concerns answered at this time. Bed rails up x 2. Call light in reach. Patient belongings in reach. Will continue to monitor.

## 2021-11-01 NOTE — CARE PLAN
The patient is Stable - Low risk of patient condition declining or worsening           Problem: Altered Physiologic Condition  Goal: Patient physiologically stable as evidenced by normal lochia, palpable uterine involution and vitals within normal limits  Outcome: Progressing  Note: Patient VS stable and within defined limits. Fundus firm at U, lochia light rubra at time of assessment.      Problem: Infection - Postpartum  Goal: Postpartum patient will be free of signs and symptoms of infection  Outcome: Progressing  Note: Patient is showing no signs or symptoms of infection at time of assessment.

## 2021-11-01 NOTE — PROGRESS NOTES
Patient received from labor and delivery via gurney to room S302. Patient transferred from [place] to bed with Hover Mat and 2 person assist. Bedside  report received from Pauly Fairchild RN, assumed care of patient.  Patient oriented to room and surroundings, call system, visiting policy, infant security including ID bands, not letting anyone take infant without photo ID, no sleeping with infant, no carrying infant in halls, no leaving infant unattended.  0-10 pain scale and pain management discussed.  Patient instructed to call for assistance before getting out of bed until stable.  Assessment done.

## 2021-11-01 NOTE — CARE PLAN
The patient is Stable - Low risk of patient condition declining or worsening    Shift Goals  Clinical Goals: Pt's VS will remain WNL    Progress made toward(s) clinical / shift goals:  VS WNL    Patient is not progressing towards the following goals: n/a
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
## 2021-11-01 NOTE — PROGRESS NOTES
SECTION POSTPARTUM PROGRESS NOTE    PATIENT ID:  NAME:  Sonya Flor  MRN:               [id number]  YOB: 1980     40 y.o. female  at 39w1d POD#1 s/p elective RLTCS      Subjective: No acute events. Tolerating po, No N/V. Ambulating minimally. +voiding, no BM or Flatus.    Objective:    Vitals:    10/31/21 2000 10/31/21 2200 21 0200 21 0600   BP:  122/64 125/61 123/65   Pulse: 85 81 83 79   Resp: 18 18 18 18   Temp:  36.4 °C (97.6 °F) 36.2 °C (97.2 °F) 36.2 °C (97.1 °F)   TempSrc:  Temporal Temporal Temporal   SpO2: 94% 94% 96% 100%   Weight:       Height:         General: No acute distress, resting comfortably in bed.  HEENT: normocephalic, nontraumatic, PERRLA, EOMI  Cardiovascular: Heart RRR with no murmurs, rubs or gallops. Distal Pulses 2+  Respiratory: symmetric chest expansion, lungs CTA bilaterally with no wheezes rales or rhonci  Abdomen: soft, mildly tender around incision which is clean, dry and intact, fundus firm, +BS  Genitourinary: lochia light, denies excessive vaginal bleeding  Musculoskeletal: strength 5/5 in four extremities  Neuro: non focal with no numbness, tingling or changes in sensation      Recent Labs     10/31/21  0825 21  0443   WBC 12.8* 16.5*   RBC 4.50 3.88*   HEMOGLOBIN 11.9* 10.1*   HEMATOCRIT 35.8* 31.8*   MCV 79.6* 82.0   MCH 26.4* 26.0*   RDW 36.5 38.1   PLATELETCT 280 278   MPV 9.4 9.5   NEUTSPOLYS 72.50*  --    LYMPHOCYTES 21.00*  --    MONOCYTES 4.40  --    EOSINOPHILS 0.90  --    BASOPHILS 0.50  --      No results for input(s): SODIUM, POTASSIUM, CHLORIDE, CO2, GLUCOSE, BUN, CPKTOTAL in the last 72 hours.    Current Meds:   Current Facility-Administered Medications   Medication Dose Frequency Provider Last Rate Last Admin   • enoxaparin (LOVENOX) inj 40 mg  40 mg DAILY Anmol Fagna M.D.       • acetaminophen (TYLENOL) tablet 1,000 mg  1,000 mg Q6HR Donna Reilly M.D.   1,000 mg at 21 0408   • ketorolac  (TORADOL) injection 30 mg  30 mg Q6HR Donna Reilly M.D.   30 mg at 11/01/21 0104   • oxyCODONE immediate-release (ROXICODONE) tablet 5 mg  5 mg Q4HRS PRN Donna Reilly M.D.       • oxyCODONE immediate release (ROXICODONE) tablet 10 mg  10 mg Q4HRS PRN Donna Reilly M.D.       • HYDROmorphone (Dilaudid) injection 0.2 mg  0.2 mg Q2HRS PRN Donna Reilly M.D.       • HYDROmorphone (Dilaudid) injection 0.4 mg  0.4 mg Q2HRS PRN Donna Reilly M.D.       • diphenhydrAMINE (BENADRYL) injection 12.5 mg  12.5 mg Q6HRS PRN Donna Reilly M.D.        Or   • diphenhydrAMINE (BENADRYL) injection 25 mg  25 mg Q6HRS PRN Donna Reilly M.D.        Or   • naloxone (NARCAN) 0.4 mg in NS 1,000 mL infusion  0.4 mg PRN Donna Reilly M.D.       • ondansetron (ZOFRAN) syringe/vial injection 4 mg  4 mg Q6HRS PRN Donna Reilly M.D.       • diphenhydrAMINE (BENADRYL) injection 12.5 mg  12.5 mg Q6HRS PRN Donna Reilly M.D.       • ePHEDrine injection 10 mg  10 mg Q5 MIN PRN Donna Reilly M.D.       • naloxone (NARCAN) 0.4 mg in NS 1,000 mL infusion  0.25 mcg/kg/hr PRN Donna Reilly M.D.       • lactated ringers infusion   Continuous Donna Reilly M.D.       • LR infusion   PRN Anmol Fagan M.D.       • PRN oxytocin (PITOCIN) (20 Units/1000 mL) PRN for excessive uterine bleeding - See Admin Instr  125-999 mL/hr Once PRN Anmol Fagan M.D.       • miSOPROStol (CYTOTEC) tablet 800 mcg  800 mcg Once PRN Anmol Fagan M.D.       • docusate sodium (COLACE) capsule 100 mg  100 mg BID PRN Anmol Fagan M.D.       • simethicone (MYLICON) chewable tab 80 mg  80 mg 4X/DAY PRN Anmol Fagan M.D.       • prenatal plus vitamin (STUARTNATAL 1+1) 27-1 MG tablet 1 Tablet  1 Tablet QAM Anmol Fagan M.D.       • FLUoxetine (PROZAC) capsule 20 mg  20 mg DAILY TRUONG Lacy   20 mg at 10/31/21 1467   • oxytocin (PITOCIN) infusion (for postpartum)  2,000 mL/hr Once Jovi Clifton M.D.   Infusion Ended Prior to Shift  at 10/31/21 2316    Followed by   • oxytocin (PITOCIN) infusion (for postpartum)   mL/hr Stuart Clifton M.D. 125 mL/hr at 10/31/21 2315 125 mL/hr at 10/31/21 2315   Last reviewed on 10/31/2021  9:12 AM by Pauly Fairchild R.N.          Assessment:  40 y.o. female  at 39w1d POD#1 s/p RLTCS     Plan:   1. Routine postop/postpartum care  2. Encourage breastfeeding  3. Encourage ambulation  4. Disposition: Likely DC to home POD #3

## 2021-11-01 NOTE — PROGRESS NOTES
Dr. Clifton called and notified that indwelling booker catheter is draining light blood tinged urine.

## 2021-11-01 NOTE — PROGRESS NOTES
Assessment complete. Fundus firm, lochia light. VSS. Tolerating diet. Voiding without difficulty. Wound vac intact and in place. Pain controlled with scheduled medications per mar. Will offer pain medications as they become available. FOB at bedside, bonding with pt/baby. POC discussed with pt. Encouraged to call with needs. Call light in place.

## 2021-11-02 ENCOUNTER — PHARMACY VISIT (OUTPATIENT)
Dept: PHARMACY | Facility: MEDICAL CENTER | Age: 41
End: 2021-11-02
Payer: COMMERCIAL

## 2021-11-02 VITALS
HEART RATE: 84 BPM | BODY MASS INDEX: 48.82 KG/M2 | OXYGEN SATURATION: 96 % | SYSTOLIC BLOOD PRESSURE: 125 MMHG | WEIGHT: 293 LBS | DIASTOLIC BLOOD PRESSURE: 68 MMHG | HEIGHT: 65 IN | TEMPERATURE: 97.5 F | RESPIRATION RATE: 19 BRPM

## 2021-11-02 PROCEDURE — RXMED WILLOW AMBULATORY MEDICATION CHARGE: Performed by: OBSTETRICS & GYNECOLOGY

## 2021-11-02 PROCEDURE — 700102 HCHG RX REV CODE 250 W/ 637 OVERRIDE(OP): Performed by: OBSTETRICS & GYNECOLOGY

## 2021-11-02 PROCEDURE — A9270 NON-COVERED ITEM OR SERVICE: HCPCS | Performed by: OBSTETRICS & GYNECOLOGY

## 2021-11-02 PROCEDURE — 700111 HCHG RX REV CODE 636 W/ 250 OVERRIDE (IP): Performed by: OBSTETRICS & GYNECOLOGY

## 2021-11-02 PROCEDURE — A9270 NON-COVERED ITEM OR SERVICE: HCPCS | Performed by: FAMILY MEDICINE

## 2021-11-02 PROCEDURE — 700102 HCHG RX REV CODE 250 W/ 637 OVERRIDE(OP): Performed by: FAMILY MEDICINE

## 2021-11-02 RX ORDER — DOCUSATE SODIUM 100 MG/1
100 CAPSULE, LIQUID FILLED ORAL 2 TIMES DAILY
Qty: 60 CAPSULE | Refills: 0 | Status: SHIPPED | OUTPATIENT
Start: 2021-11-02

## 2021-11-02 RX ORDER — OXYCODONE HYDROCHLORIDE 5 MG/1
5 TABLET ORAL EVERY 4 HOURS PRN
Qty: 30 TABLET | Refills: 0 | Status: SHIPPED | OUTPATIENT
Start: 2021-11-02 | End: 2021-11-07

## 2021-11-02 RX ORDER — IBUPROFEN 800 MG/1
800 TABLET ORAL EVERY 8 HOURS PRN
Qty: 30 TABLET | Refills: 0 | Status: SHIPPED | OUTPATIENT
Start: 2021-11-02 | End: 2021-11-08 | Stop reason: SDUPTHER

## 2021-11-02 RX ADMIN — ENOXAPARIN SODIUM 40 MG: 40 INJECTION SUBCUTANEOUS at 06:03

## 2021-11-02 RX ADMIN — IBUPROFEN 600 MG: 600 TABLET, FILM COATED ORAL at 14:12

## 2021-11-02 RX ADMIN — IBUPROFEN 600 MG: 600 TABLET, FILM COATED ORAL at 07:48

## 2021-11-02 RX ADMIN — OXYCODONE 5 MG: 5 TABLET ORAL at 15:47

## 2021-11-02 RX ADMIN — OXYCODONE 5 MG: 5 TABLET ORAL at 03:04

## 2021-11-02 RX ADMIN — OXYCODONE 5 MG: 5 TABLET ORAL at 11:08

## 2021-11-02 RX ADMIN — PRENATAL WITH FERROUS FUM AND FOLIC ACID 1 TABLET: 3080; 920; 120; 400; 22; 1.84; 3; 20; 10; 1; 12; 200; 27; 25; 2 TABLET ORAL at 06:02

## 2021-11-02 RX ADMIN — OXYCODONE 5 MG: 5 TABLET ORAL at 07:48

## 2021-11-02 ASSESSMENT — PAIN DESCRIPTION - PAIN TYPE
TYPE: SURGICAL PAIN

## 2021-11-02 NOTE — PROGRESS NOTES
Assessment complete. Fundus firm, lochia light. VSS. Tolerating diet. Voiding without difficulty. Wound vac in place. Pt states passing gas. Pain controlled with prn medications per mar. Will offer pain medications as they become available. POC discussed with pt. Encouraged to call with needs. Call light in place.

## 2021-11-02 NOTE — DISCHARGE INSTRUCTIONS
PATIENT DISCHARGE EDUCATION INSTRUCTION SHEET  REASONS TO CALL YOUR OBSTETRICIAN  · Persistent fever, shaking, chills (Temperature higher than 100.4) may indicate you have an infection  · Heavy bleeding: soaking more than 1 pad per hour; Passing clots an egg-sized clot or bigger may mean you have an postpartum hemorrhage  · Foul odor from vagina or bad smelling or discolored discharge or blood  · Breast infection (Mastitis symptoms); breast pain, chills, fever, redness or red streaks, may feel flu like symptoms  · Urinary pain, burning or frequency  · Incision that is not healing, increased redness, swelling, tenderness or pain, or any pus from episiotomy or  site may mean you have an infection  · Redness, swelling, warmth, or painful to touch in the calf area of your leg may mean you have a blood clot  · Severe or intensified depression, thoughts or feelings of wanting to hurt yourself or someone else   · Pain in chest, obstructed breathing or shortness of breath (trouble catching your breath) may mean you are having a postpartum complication. Call your provider immediately   · Headache that does not get better, even after taking medicine, a bad headache with vision changes or pain in the upper right area of your belly may mean you have high blood pressure or post birth preeclampsia. Call your provider immediately    HAND WASHING  All family and friends should wash their hands:  · Before and after holding the baby  · Before feeding the baby  · After using the restroom or changing the baby's diaper    WOUND CARE  Ask your physician for additional care instructions. In general:  ·  Incision:  · May shower and pat incision dry   · Keep the incision clean and dry  · There should not be any opening or pus from the incision  · Continue to walk at home 3 times a day   · Do NOT lift anything heavier than your baby (over 10 pounds)  · Encourage family to help participate in care of the  to allow  "rest and mom time to heal    VAGINAL CARE AND BLEEDING  · Nothing inside vagina for 6 weeks:   · No sexual intercourse, tampons or douching  · Bleeding may continue for 2-4 weeks. Amount and color may vary  · Soaking 1 pad or more in an hour for several hours is considered heavy bleeding  · Passing large egg sized blood clots can be concerning  · If you feel like you have heavy bleeding or are having increasing amount of blood clots call your Obstetrician immediately  · If you begin feeling faint upon standing, feeling sick to your stomach, have clammy skin, a really fast heartbeat, have chills, start feeling confused, dizzy, sleepy or weak, or feeling like you're going to faint call your Obstetrician immediately    HYPERTENSION   Preeclampsia or gestational hypertension are types of high blood pressure that only pregnant women can get. It is important for you to be aware of symptoms to seek early intervention and treatment. If you have any of these symptoms immediately call your Obstetrician    · Vision changes or blurred vision   · Severe headache or pain that is unrelieved with medication and will not go away  · Persistent pain in upper abdomen or shoulder   · Increased swelling of face, feet, or hands  · Difficulty breathing or shortness of breath at rest  · Urinating less than usual    URINATION AND BOWEL MOVEMENTS  · Eating more fiber (bran cereal, fruits, and vegetables) and drinking plenty of fluids will help to avoid constipation  · Urinary frequency and urgency after childbirth is normal  · If you experience any urinary pain, burning or frequency call your provider    BIRTH CONTROL  · It is possible to become pregnant at any time after delivery and while breastfeeding  · Plan to discuss a method of birth control with your physician at your post delivery follow up visit    POSTPARTUM BLUES  During the first few days after birth, you may experience a sense of the \"blues\" which may include impatience, " "irritability or even crying. These feelings come and go quickly. However, as many as 1 in 10 women experience emotional symptoms known as postpartum depression.     POSTPARTUM DEPRESSION    May start as early as the second or third day after delivery or take several weeks or months to develop. Symptoms of \"blues\" are present, but are more intense: Crying spells; loss of appetite; feelings of hopelessness or loss of control; fear of touching the baby; over concern or no concern at all about the baby; little or no concern about your own appearance/caring for yourself; and/or inability to sleep or excessive sleeping. Contact your Obstetrician if you are experiencing any of these symptoms     PREVENTING SHAKEN BABY  If you are angry or stressed, PUT THE BABY IN THE CRIB, step away, take some deep breaths, and wait until you are calm to care for the baby. DO NOT SHAKE THE BABY. You are not alone, call a supporter for help.  · [facility] 24/7 crisis call line (362-188-7230) or (1-529.216.8402)  · You can also text them, text \"ANSWER\" (802984)      "

## 2021-11-02 NOTE — DISCHARGE PLANNING
Meds-to-Beds: Discharge prescription orders listed below delivered to patient's bedside. RN Demetra notified. Patient counseled.      Current Outpatient Medications   Medication Sig Dispense Refill   • oxyCODONE immediate-release (ROXICODONE) 5 MG Tab Take 1 Tablet by mouth every four hours as needed for up to 5 days. 30 Tablet 0   • ibuprofen (MOTRIN) 800 MG Tab Take 1 Tablet by mouth every 8 hours as needed for Moderate Pain. 30 Tablet 0   • docusate sodium (COLACE) 100 MG Cap Take 1 Capsule by mouth 2 times a day. 60 Capsule 0      Marleny Huitron, PharmD

## 2021-11-02 NOTE — CARE PLAN
The patient is Stable - Low risk of patient condition declining or worsening    Shift Goals  Clinical Goals: pain control. maintain VSS, ambulate.    Progress made toward(s) clinical / shift goals:  Pain controlled with PRN medication, ambulating by self, and able to care for self and infant.
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
## 2021-11-08 ENCOUNTER — POST PARTUM (OUTPATIENT)
Dept: OBGYN | Facility: CLINIC | Age: 41
End: 2021-11-08
Payer: MEDICAID

## 2021-11-08 VITALS — BODY MASS INDEX: 67.23 KG/M2 | SYSTOLIC BLOOD PRESSURE: 142 MMHG | WEIGHT: 293 LBS | DIASTOLIC BLOOD PRESSURE: 90 MMHG

## 2021-11-08 DIAGNOSIS — Z41.9 SURGERY, ELECTIVE: ICD-10-CM

## 2021-11-08 PROCEDURE — 99024 POSTOP FOLLOW-UP VISIT: CPT | Performed by: OBSTETRICS & GYNECOLOGY

## 2021-11-08 RX ORDER — IBUPROFEN 800 MG/1
800 TABLET ORAL EVERY 8 HOURS PRN
Qty: 30 TABLET | Refills: 1 | Status: SHIPPED | OUTPATIENT
Start: 2021-11-08

## 2021-11-08 RX ORDER — OXYCODONE HYDROCHLORIDE AND ACETAMINOPHEN 5; 325 MG/1; MG/1
1 TABLET ORAL EVERY 4 HOURS PRN
Qty: 20 TABLET | Refills: 0 | Status: SHIPPED | OUTPATIENT
Start: 2021-11-08 | End: 2021-11-15

## 2021-11-08 ASSESSMENT — FIBROSIS 4 INDEX: FIB4 SCORE: 0.5

## 2021-11-08 NOTE — PROGRESS NOTES
Chief complaint: Postoperative visit    SUBJECTIVE:  Sonya Flor presents to the clinic 1 weeks following repeat     Eating a regular diet without difficulty.   Bowel movement are Normal.       Patient experiencing a headache for the last 2 days..   Patient Denies Incisional pain, drainage or redness    OBJECTIVE:  /90   Wt (!) 183 kg (404 lb)   LMP 2021   BMI 67.23 kg/m²   Current Outpatient Medications on File Prior to Visit   Medication Sig Dispense Refill   • FLUoxetine (PROZAC) 20 MG Cap Take 20 mg by mouth every day.     • hydrOXYzine HCl (ATARAX) 25 MG Tab TAKE 1 TABLET BY MOUTH THREE TIMES A DAY AS NEEDED FOR ANXIETY 270 Tab 1   • docusate sodium (COLACE) 100 MG Cap Take 1 Capsule by mouth 2 times a day. (Patient not taking: Reported on 2021) 60 Capsule 0   • Prenatal Vit-Fe Fumarate-FA (PRENATAL 1+1 PO) Take 1 Tablet by mouth every day. (Patient not taking: Reported on 2021)       No current facility-administered medications on file prior to visit.       Constitutional:  alert, healthy, no distress.  Abdomen:  soft, bowel sounds active, non-tender, non-distended.  Incision:  healing well, no drainage, no erythema, no hernia, no seroma, no swelling, no dehiscence, incision well approximated.    IMPRESSION: Doing well postoperatively.  Pt is to increase activities as tolerated.    Lab:   Recent Results (from the past 1008 hour(s))   POCT Fetal Nonstress Test    Collection Time: 21  8:26 AM   Result Value Ref Range    NST Indications      NST Baseline      NST Uterine Activity      NST Acoustic Stimulation      NST Assessment      NST Action Necessary      NST Other Data      NST Return      NST Read By     POCT Fetal Nonstress Test    Collection Time: 10/14/21  8:10 AM   Result Value Ref Range    NST Indications      NST Baseline      NST Uterine Activity      NST Acoustic Stimulation      NST Assessment      NST Action Necessary      NST Other Data      NST  Return      NST Read By     BEAR MONROY STREP, BY PCR (MILLER BROTH)    Collection Time: 10/14/21  8:50 AM    Specimen: Genital   Result Value Ref Range    Strep Gp B DNA PCR Negative Negative   POCT Fetal Nonstress Test    Collection Time: 10/21/21  9:25 AM   Result Value Ref Range    NST Indications AMA, obesity, CHTN     NST Baseline 145     NST Uterine Activity no     NST Acoustic Stimulation no     NST Assessment category 1     NST Action Necessary none     NST Other Data 15x15 accels, moderate variability     NST Return twice weekly     NST Read By zee    SARS-CoV-2 PCR (24 hour In-House): Collect NP swab in VTM    Collection Time: 10/28/21 11:18 AM    Specimen: Nasopharyngeal; Respirate   Result Value Ref Range    SARS-CoV-2 Source NP Swab     SARS-CoV-2 by PCR NotDetected    CBC WITH DIFFERENTIAL    Collection Time: 10/31/21  8:25 AM   Result Value Ref Range    WBC 12.8 (H) 4.8 - 10.8 K/uL    RBC 4.50 4.20 - 5.40 M/uL    Hemoglobin 11.9 (L) 12.0 - 16.0 g/dL    Hematocrit 35.8 (L) 37.0 - 47.0 %    MCV 79.6 (L) 81.4 - 97.8 fL    MCH 26.4 (L) 27.0 - 33.0 pg    MCHC 33.2 (L) 33.6 - 35.0 g/dL    RDW 36.5 35.9 - 50.0 fL    Platelet Count 280 164 - 446 K/uL    MPV 9.4 9.0 - 12.9 fL    Neutrophils-Polys 72.50 (H) 44.00 - 72.00 %    Lymphocytes 21.00 (L) 22.00 - 41.00 %    Monocytes 4.40 0.00 - 13.40 %    Eosinophils 0.90 0.00 - 6.90 %    Basophils 0.50 0.00 - 1.80 %    Immature Granulocytes 0.70 0.00 - 0.90 %    Nucleated RBC 0.00 /100 WBC    Neutrophils (Absolute) 9.30 (H) 2.00 - 7.15 K/uL    Lymphs (Absolute) 2.70 1.00 - 4.80 K/uL    Monos (Absolute) 0.57 0.00 - 0.85 K/uL    Eos (Absolute) 0.11 0.00 - 0.51 K/uL    Baso (Absolute) 0.07 0.00 - 0.12 K/uL    Immature Granulocytes (abs) 0.09 0.00 - 0.11 K/uL    NRBC (Absolute) 0.00 K/uL   HOLD BLOOD BANK SPECIMEN (NOT TESTED)    Collection Time: 10/31/21  8:25 AM   Result Value Ref Range    Holding Tube - Bb DONE    MOM RHHDN/RHOGAM    Collection Time: 10/31/21  7:46 PM    Result Value Ref Range    Immune Rosetting Test NEG     Number Of Rh Doses Indicated 1    ACTION: RH IMMUNE GLOBULIN    Collection Time: 10/31/21  7:46 PM   Result Value Ref Range    Action  Rh Immune Globulin [id number]       issued       1 Syrng    CBC without differential    Collection Time: 11/01/21  4:43 AM   Result Value Ref Range    WBC 16.5 (H) 4.8 - 10.8 K/uL    RBC 3.88 (L) 4.20 - 5.40 M/uL    Hemoglobin 10.1 (L) 12.0 - 16.0 g/dL    Hematocrit 31.8 (L) 37.0 - 47.0 %    MCV 82.0 81.4 - 97.8 fL    MCH 26.0 (L) 27.0 - 33.0 pg    MCHC 31.8 (L) 33.6 - 35.0 g/dL    RDW 38.1 35.9 - 50.0 fL    Platelet Count 278 164 - 446 K/uL    MPV 9.5 9.0 - 12.9 fL   Histology Request    Collection Time: 11/01/21  9:06 AM   Result Value Ref Range    Pathology Request Sent to Hist[facility]        PLAN:  Continue any current medications.  (See Med List for details.)  Return to clinic: in 4 week(s).    Incision healing well, no signs of infection.    This does not appear to be a post dural puncture headache.  Recommended use of Tylenol or ibuprofen    Patient still having some pain, 1 refill of narcotic given today.    All questions answered

## 2021-11-08 NOTE — NON-PROVIDER
Patient here for C Section check.  C Section done on 10/31/2021  Patient is  Vaginal bleeding is  PP visit Needs  Phone number: [phone]  Pharmacy verified

## 2022-05-01 RX ORDER — HYDROXYZINE HYDROCHLORIDE 25 MG/1
25 TABLET, FILM COATED ORAL 3 TIMES DAILY PRN
Qty: 270 TABLET | Refills: 1 | Status: SHIPPED | OUTPATIENT
Start: 2022-05-01

## 2024-02-04 NOTE — PROGRESS NOTES
NST Cat 1 for elevated BP. Bps 130-140s/70-80s. Will not do testing today. Monitor BP 1 wk, preeclampsia precautions stressed today  
04-Feb-2024 11:39

## (undated) DEVICE — CATHETER IV NON-SAFETY 18 GA X 1 1/4 (50/BX 4BX/CA)

## (undated) DEVICE — PAD LAP STERILE 18 X 18 - (5/PK 40PK/CA)

## (undated) DEVICE — SUTURE 0 VICRYL PLUS CT 36 (36PK/BX)"

## (undated) DEVICE — TELFA 8 X 10 BIOSEAL - (50EA/CA)

## (undated) DEVICE — PENCIL ELECTSURG 10FT HLSTR - WITH BLADE (50EA/CA)

## (undated) DEVICE — SUTURE 2-0 CHROMIC GUT CT-1 27 (36PK/BX)"

## (undated) DEVICE — KIT  I.V. START (100EA/CA)

## (undated) DEVICE — SYSTEM PREVENA INCISION MNGM - (1/EA)

## (undated) DEVICE — SET EXTENSION WITH 2 PORTS (48EA/CA) ***PART #2C8610 IS A SUBSTITUTE*****

## (undated) DEVICE — HEAD HOLDER JUNIOR/ADULT

## (undated) DEVICE — SUTURE 0 VICRYL PLUS CT-1 - 36 INCH (36/BX)

## (undated) DEVICE — SLEEVE, SEQUENTIAL CALF REG

## (undated) DEVICE — PACK ROOM TURNOVER L&D (12/CA)

## (undated) DEVICE — TRAY SPINAL ANESTHESIA NON-SAFETY (10/CA)

## (undated) DEVICE — BLANKET UNDERBODY FULL ACCES - (5/CA)

## (undated) DEVICE — DRESSING INTERCEED ABSORBABLE ADHESION BARRIER TC7 (10EA/CA)

## (undated) DEVICE — WATER IRRIGATION STERILE 1000ML (12EA/CA)

## (undated) DEVICE — ELECTRODE DUAL RETURN W/ CORD - (50/PK)

## (undated) DEVICE — PACK C-SECTION (2EA/CA)

## (undated) DEVICE — CHLORAPREP 26 ML APPLICATOR - ORANGE TINT(25/CA)

## (undated) DEVICE — LACTATED RINGERS INJ 1000 ML - (14EA/CA 60CA/PF)

## (undated) DEVICE — WATER IRRIG. STER. 1500 ML - (9/CA)

## (undated) DEVICE — STAPLER SKIN DISP - (6/BX 10BX/CA) VISISTAT

## (undated) DEVICE — TAPE CLOTH MEDIPORE 6 INCH - (12RL/CA)

## (undated) DEVICE — CANISTER SUCTION 3000ML MECHANICAL FILTER AUTO SHUTOFF MEDI-VAC NONSTERILE LF DISP  (40EA/CA)

## (undated) DEVICE — RETRACTOR O C SECTION X-LRY - (5/BX)

## (undated) DEVICE — PAD GROUNDING PRE-JELLED - (50EA/PK)

## (undated) DEVICE — SUTURE 3-0 VICRYL PLUS CT-1 - 36 INCH (36/BX)

## (undated) DEVICE — TUBING CLEARLINK DUO-VENT - C-FLO (48EA/CA)

## (undated) DEVICE — SODIUM CHL IRRIGATION 0.9% 1000ML (12EA/CA)

## (undated) DEVICE — GLOVE, BIOGEL ECLIPSE, SZ 7.0, PF LTX (50/BX)

## (undated) DEVICE — SPONGE GAUZESTER 4 X 4 4PLY - (128PK/CA)

## (undated) DEVICE — GLOVE BIOGEL SZ 8 SURGICAL PF LTX - (50PR/BX 4BX/CA)

## (undated) DEVICE — SUTURE 1 CHROMIC CTX ETHICON - (36PK/BX)

## (undated) DEVICE — DETERGENT RENUZYME PLUS 10 OZ PACKET (50/BX)